# Patient Record
Sex: MALE | Race: WHITE | NOT HISPANIC OR LATINO | Employment: FULL TIME | URBAN - METROPOLITAN AREA
[De-identification: names, ages, dates, MRNs, and addresses within clinical notes are randomized per-mention and may not be internally consistent; named-entity substitution may affect disease eponyms.]

---

## 2017-06-05 ENCOUNTER — ALLSCRIPTS OFFICE VISIT (OUTPATIENT)
Dept: OTHER | Facility: OTHER | Age: 47
End: 2017-06-05

## 2017-06-07 ENCOUNTER — HOSPITAL ENCOUNTER (OUTPATIENT)
Dept: RADIOLOGY | Facility: HOSPITAL | Age: 47
Discharge: HOME | End: 2017-06-07
Payer: COMMERCIAL

## 2017-06-07 ENCOUNTER — ALLSCRIPTS OFFICE VISIT (OUTPATIENT)
Dept: OTHER | Facility: OTHER | Age: 47
End: 2017-06-07

## 2017-06-07 ENCOUNTER — GENERIC CONVERSION - ENCOUNTER (OUTPATIENT)
Dept: OTHER | Facility: OTHER | Age: 47
End: 2017-06-07

## 2017-06-07 ENCOUNTER — TRANSCRIBE ORDERS (OUTPATIENT)
Dept: ADMINISTRATIVE | Facility: HOSPITAL | Age: 47
End: 2017-06-07

## 2017-06-07 DIAGNOSIS — J20.9 ACUTE BRONCHITIS: ICD-10-CM

## 2017-06-07 PROCEDURE — 71020 HB CHEST X-RAY 2VW FRONTAL&LATL: CPT

## 2017-06-27 ENCOUNTER — ALLSCRIPTS OFFICE VISIT (OUTPATIENT)
Dept: OTHER | Facility: OTHER | Age: 47
End: 2017-06-27

## 2017-07-12 ENCOUNTER — TRANSCRIBE ORDERS (OUTPATIENT)
Dept: ADMINISTRATIVE | Facility: HOSPITAL | Age: 47
End: 2017-07-12

## 2017-07-12 ENCOUNTER — APPOINTMENT (OUTPATIENT)
Dept: LAB | Facility: HOSPITAL | Age: 47
End: 2017-07-12
Payer: COMMERCIAL

## 2017-07-12 DIAGNOSIS — Z00.8 HEALTH EXAMINATION IN POPULATION SURVEY: Primary | ICD-10-CM

## 2017-07-12 DIAGNOSIS — Z00.8 HEALTH EXAMINATION IN POPULATION SURVEY: ICD-10-CM

## 2017-07-12 LAB
CHOLEST SERPL-MCNC: 200 MG/DL (ref 50–200)
EST. AVERAGE GLUCOSE BLD GHB EST-MCNC: 111 MG/DL
HBA1C MFR BLD: 5.5 % (ref 4.2–6.3)
HDLC SERPL-MCNC: 31 MG/DL (ref 40–60)
LDLC SERPL CALC-MCNC: 146 MG/DL (ref 0–100)
TRIGL SERPL-MCNC: 117 MG/DL

## 2017-07-12 PROCEDURE — 80061 LIPID PANEL: CPT

## 2017-07-12 PROCEDURE — 83036 HEMOGLOBIN GLYCOSYLATED A1C: CPT

## 2017-07-12 PROCEDURE — 36415 COLL VENOUS BLD VENIPUNCTURE: CPT

## 2017-10-12 ENCOUNTER — ALLSCRIPTS OFFICE VISIT (OUTPATIENT)
Dept: OTHER | Facility: OTHER | Age: 47
End: 2017-10-12

## 2017-10-13 NOTE — PROGRESS NOTES
Assessment  1  Acute left otitis media (382 9) (S99 03)    Plan  Acute left otitis media    · Azithromycin 250 MG Oral Tablet; TAKE 2 TABLETS ON DAY 1 THEN TAKE 1  TABLET A DAY FOR 4 DAYS    Discussion/Summary    L OM and fever - d/w pt that symptoms may just be related to OM but does sound almost flu like but just right outside benefit of anti-viral medication, will start abx for L OM and advised symptomatic tx with rest/fluids/lozenges/hot tea and plenty of hand washing, call with new/worsening symptoms, note given out of work today and tomorrow and can return Monday if w/o fever for 24 hrs  The patient was counseled regarding instructions for management,-risk factor reductions,-prognosis,-patient and family education,-impressions,-risks and benefits of treatment options,-importance of compliance with treatment  Possible side effects of new medications were reviewed with the patient/guardian today  The treatment plan was reviewed with the patient/guardian  The patient/guardian understands and agrees with the treatment plan     Self Referrals: No      Chief Complaint  sick visit      History of Present Illness  HPI: Pt here with 3 -4 days of not feeling well  He has had fevers with Tm 103  1  He notes minimal congestion and mild ST  He notes mild cough but no SOB/wheezing  He notes no ear pain/sinus pain or pressured  He has body aches and HA's  He has not had a flu vaccine this year  His wife is also sick  He has tried Motrin and Theraflu  He took Motrin about 5 hrs ago  Review of Systems    Constitutional: fever,-feeling poorly,-chills-and-feeling tired  ENT: sore throat-and-nasal discharge, but-no earache  Respiratory: cough, but-no shortness of breath-and-no wheezing  Gastrointestinal: no nausea,-no vomiting-and-no diarrhea  Genitourinary: no dysuria  Musculoskeletal: arthralgias-and-myalgias  Integumentary: no rashes  Neurological: headache, but-no dizziness  Active Problems  1  History of Aftercare following surgery of the musculoskeletal system (V58 78) (Z47 89)   2  Allergic cough (786 2) (R05)   3  Pain in joint of right shoulder region (719 41) (M25 511)   4  Seasonal allergic rhinitis due to pollen (477 0) (J30 1)    Past Medical History  Active Problems And Past Medical History Reviewed: The active problems and past medical history were reviewed and updated today  Surgical History  Surgical History Reviewed: The surgical history was reviewed and updated today  Social History   · Being A Social Drinker   · Caffeine Use   · Chewing Tobacco Loose Leaf   · Former smoker (V15 82) (D15 884)   · Marital History - Currently   The social history was reviewed and updated today  Family History  Family History Reviewed: The family history was reviewed and updated today  Current Meds   1  12 Hour Nasal Spray 0 05 % Nasal Solution; Therapy: (Recorded:03Jun2014) to Recorded   2  Aleve CAPS; Therapy: (Recorded:03Jun2014) to Recorded   3  Allergy 10 MG Oral Tablet; Therapy: (XWSHOIQZ:[id number]) to Recorded   4  Montelukast Sodium 10 MG Oral Tablet; TAKE 1 TABLET DAILY AS DIRECTED; Therapy: [id number] to (Evaluate:[id number])  Requested for: 17XXL4601; Last   Rx:05Jun2017 Ordered    The medication list was reviewed and updated today  Allergies  1  Chantix TABS    Vitals   Recorded: [id number] 01:47PM   Temperature 97 5 F   Heart Rate 82   Systolic 811   Diastolic 82   Height 6 ft 3 in   Weight 245 lb    BMI Calculated 30 62   BSA Calculated 2 39     Physical Exam    Constitutional   General appearance: No acute distress, well appearing and well nourished  Ears, Nose, Mouth, and Throat   External inspection of ears and nose: Normal     Otoscopic examination: Abnormal  -L canal and TM with erythema and dec light reflex  Oropharynx: Abnormal  -mild post pharyngeal erythema, no exudate     Pulmonary   Respiratory effort: No increased work of breathing or signs of respiratory distress  Auscultation of lungs: Clear to auscultation, equal breath sounds bilaterally, no wheezes, no rales, no rhonci  Cardiovascular   Auscultation of heart: Normal rate and rhythm, normal S1 and S2, without murmurs  Lymphatic   Palpation of lymph nodes in neck: No lymphadenopathy  Musculoskeletal   Gait and station: Normal     Psychiatric   Mood and affect: Normal          Results/Data  (1) LIPID PANEL, FASTING [id number] 06:58AM Gayl December     Test Name Result Flag Reference   CHOLESTEROL 200 mg/dL     HDL,DIRECT 31 mg/dL L 40-60   Specimen collection should occur prior to Metamizole administration due to the potential for falsely depressed results  LDL CHOLESTEROL CALCULATED 146 mg/dL H 0-100   This is a fasting blood test  Water,black tea or black  coffee only after 9:00pm the night before test  Drink 2 glasses of water the morning of test         Triglyceride:         Normal              <150 mg/dl       Borderline High    150-199 mg/dl       High               200-499 mg/dl       Very High          >499 mg/dl  Cholesterol:         Desirable        <200 mg/dl      Borderline High  200-239 mg/dl      High             >239 mg/dl  HDL Cholesterol:        High    >59 mg/dL      Low     <41 mg/dL  LDL CALCULATED:    This screening LDL is a calculated result  It does not have the accuracy of the Direct Measured LDL in the monitoring of patients with hyperlipidemia and/or statin therapy  Direct Measure LDL (ESG735) must be ordered separately in these patients  TRIGLYCERIDES 117 mg/dL  <=150   Specimen collection should occur prior to N-Acetylcysteine or Metamizole administration due to the potential for falsely depressed results  (1) HEMOGLOBIN A1C [id number] 06:58AM Gayl December     Test Name Result Flag Reference   HEMOGLOBIN A1C 5 5 %  4 2-6 3   EST  AVG   GLUCOSE 111 mg/dl       (1) LIPID PANEL, FASTING [id number] 06:52AM Gayl December     Test Name Result Flag Reference   CHOLESTEROL 199 mg/dL     HDL,DIRECT 35 mg/dL L 40-60   Specimen collection should occur prior to Metamizole administration due to the potential for falsely depressed results  LDL CHOLESTEROL CALCULATED 126 mg/dL H 0-100   Triglyceride:         Normal              <150 mg/dl       Borderline High    150-199 mg/dl       High               200-499 mg/dl       Very High          >499 mg/dl  Cholesterol:         Desirable        <200 mg/dl      Borderline High  200-239 mg/dl      High             >239 mg/dl  HDL Cholesterol:        High    >59 mg/dL      Low     <41 mg/dL  LDL CALCULATED:    This screening LDL is a calculated result  It does not have the accuracy of the Direct Measured LDL in the monitoring of patients with hyperlipidemia and/or statin therapy  Direct Measure LDL (VIB777) must be ordered separately in these patients  TRIGLYCERIDES 189 mg/dL H <=150   Specimen collection should occur prior to N-Acetylcysteine or Metamizole administration due to the potential for falsely depressed results  (1) HEMOGLOBIN A1C [id number] 06:52AM Heriberto Borges     Test Name Result Flag Reference   HEMOGLOBIN A1C 5 7 %  4 2-6 3   EST  AVG   GLUCOSE 117 mg/dl         Signatures   Electronically signed by : Pedrito Garcias DO; Oct 12 2017  2:32PM EST                       (Author)

## 2017-10-14 ENCOUNTER — HOSPITAL ENCOUNTER (INPATIENT)
Facility: HOSPITAL | Age: 47
LOS: 2 days | Discharge: HOME | DRG: 871 | End: 2017-10-16
Attending: INTERNAL MEDICINE | Admitting: INTERNAL MEDICINE
Payer: COMMERCIAL

## 2017-10-14 ENCOUNTER — TRANSCRIBE ORDERS (OUTPATIENT)
Dept: URGENT CARE | Facility: CLINIC | Age: 47
End: 2017-10-14

## 2017-10-14 ENCOUNTER — APPOINTMENT (OUTPATIENT)
Dept: RADIOLOGY | Facility: CLINIC | Age: 47
End: 2017-10-14
Payer: COMMERCIAL

## 2017-10-14 ENCOUNTER — OFFICE VISIT (OUTPATIENT)
Dept: URGENT CARE | Facility: CLINIC | Age: 47
End: 2017-10-14
Payer: COMMERCIAL

## 2017-10-14 DIAGNOSIS — R52 PAIN: ICD-10-CM

## 2017-10-14 DIAGNOSIS — J18.9 COMMUNITY ACQUIRED PNEUMONIA: Primary | ICD-10-CM

## 2017-10-14 DIAGNOSIS — R05.9 COUGH: Primary | ICD-10-CM

## 2017-10-14 DIAGNOSIS — A41.9 SEPSIS (HCC): ICD-10-CM

## 2017-10-14 PROBLEM — J11.1 INFLUENZA: Status: ACTIVE | Noted: 2017-10-14

## 2017-10-14 PROBLEM — R65.10 SIRS (SYSTEMIC INFLAMMATORY RESPONSE SYNDROME) (HCC): Status: ACTIVE | Noted: 2017-10-14

## 2017-10-14 LAB
ALBUMIN SERPL BCP-MCNC: 3.4 G/DL (ref 3.5–5)
ALP SERPL-CCNC: 57 U/L (ref 46–116)
ALT SERPL W P-5'-P-CCNC: 37 U/L (ref 12–78)
ANION GAP SERPL CALCULATED.3IONS-SCNC: 12 MMOL/L (ref 4–13)
APTT PPP: 32 SECONDS (ref 23–35)
AST SERPL W P-5'-P-CCNC: 22 U/L (ref 5–45)
BASOPHILS # BLD MANUAL: 0 THOUSAND/UL (ref 0–0.1)
BASOPHILS NFR MAR MANUAL: 0 % (ref 0–1)
BILIRUB SERPL-MCNC: 0.8 MG/DL (ref 0.2–1)
BUN SERPL-MCNC: 15 MG/DL (ref 5–25)
CALCIUM SERPL-MCNC: 8.1 MG/DL (ref 8.3–10.1)
CHLORIDE SERPL-SCNC: 98 MMOL/L (ref 100–108)
CO2 SERPL-SCNC: 25 MMOL/L (ref 21–32)
CREAT SERPL-MCNC: 1.3 MG/DL (ref 0.6–1.3)
DOHLE BOD BLD QL SMEAR: PRESENT
EOSINOPHIL # BLD MANUAL: 0.1 THOUSAND/UL (ref 0–0.4)
EOSINOPHIL NFR BLD MANUAL: 1 % (ref 0–6)
ERYTHROCYTE [DISTWIDTH] IN BLOOD BY AUTOMATED COUNT: 12.6 % (ref 11.6–15.1)
GFR SERPL CREATININE-BSD FRML MDRD: 65 ML/MIN/1.73SQ M
GLUCOSE SERPL-MCNC: 94 MG/DL (ref 65–140)
HCT VFR BLD AUTO: 43.2 % (ref 36.5–49.3)
HGB BLD-MCNC: 15.2 G/DL (ref 12–17)
INR PPP: 1.07 (ref 0.86–1.16)
LACTATE SERPL-SCNC: 0.9 MMOL/L (ref 0.5–2)
LG PLATELETS BLD QL SMEAR: PRESENT
LYMPHOCYTES # BLD AUTO: 0.84 THOUSAND/UL (ref 0.6–4.47)
LYMPHOCYTES # BLD AUTO: 8 % (ref 14–44)
MCH RBC QN AUTO: 30.3 PG (ref 26.8–34.3)
MCHC RBC AUTO-ENTMCNC: 35.2 G/DL (ref 31.4–37.4)
MCV RBC AUTO: 86 FL (ref 82–98)
MONOCYTES # BLD AUTO: 0.21 THOUSAND/UL (ref 0–1.22)
MONOCYTES NFR BLD: 2 % (ref 4–12)
NEUTROPHILS # BLD MANUAL: 9.08 THOUSAND/UL (ref 1.85–7.62)
NEUTS SEG NFR BLD AUTO: 87 % (ref 43–75)
PLATELET # BLD AUTO: 139 THOUSANDS/UL (ref 149–390)
PLATELET BLD QL SMEAR: ABNORMAL
PMV BLD AUTO: 11.3 FL (ref 8.9–12.7)
POTASSIUM SERPL-SCNC: 3.6 MMOL/L (ref 3.5–5.3)
PROT SERPL-MCNC: 7.5 G/DL (ref 6.4–8.2)
PROTHROMBIN TIME: 13.7 SECONDS (ref 12.1–14.4)
RBC # BLD AUTO: 5.01 MILLION/UL (ref 3.88–5.62)
RBC MORPH BLD: NORMAL
SODIUM SERPL-SCNC: 135 MMOL/L (ref 136–145)
TOTAL CELLS COUNTED SPEC: 100
VARIANT LYMPHS # BLD AUTO: 2 %
WBC # BLD AUTO: 10.44 THOUSAND/UL (ref 4.31–10.16)

## 2017-10-14 PROCEDURE — 85730 THROMBOPLASTIN TIME PARTIAL: CPT | Performed by: PHYSICIAN ASSISTANT

## 2017-10-14 PROCEDURE — 96374 THER/PROPH/DIAG INJ IV PUSH: CPT

## 2017-10-14 PROCEDURE — 99284 EMERGENCY DEPT VISIT MOD MDM: CPT

## 2017-10-14 PROCEDURE — 99213 OFFICE O/P EST LOW 20 MIN: CPT

## 2017-10-14 PROCEDURE — 80053 COMPREHEN METABOLIC PANEL: CPT | Performed by: PHYSICIAN ASSISTANT

## 2017-10-14 PROCEDURE — 93005 ELECTROCARDIOGRAM TRACING: CPT | Performed by: PHYSICIAN ASSISTANT

## 2017-10-14 PROCEDURE — 85610 PROTHROMBIN TIME: CPT | Performed by: PHYSICIAN ASSISTANT

## 2017-10-14 PROCEDURE — 87040 BLOOD CULTURE FOR BACTERIA: CPT | Performed by: PHYSICIAN ASSISTANT

## 2017-10-14 PROCEDURE — 83605 ASSAY OF LACTIC ACID: CPT | Performed by: PHYSICIAN ASSISTANT

## 2017-10-14 PROCEDURE — 87798 DETECT AGENT NOS DNA AMP: CPT | Performed by: PHYSICIAN ASSISTANT

## 2017-10-14 PROCEDURE — 85007 BL SMEAR W/DIFF WBC COUNT: CPT | Performed by: PHYSICIAN ASSISTANT

## 2017-10-14 PROCEDURE — 85027 COMPLETE CBC AUTOMATED: CPT | Performed by: PHYSICIAN ASSISTANT

## 2017-10-14 PROCEDURE — S9088 SERVICES PROVIDED IN URGENT: HCPCS

## 2017-10-14 PROCEDURE — 96361 HYDRATE IV INFUSION ADD-ON: CPT

## 2017-10-14 PROCEDURE — 36415 COLL VENOUS BLD VENIPUNCTURE: CPT | Performed by: PHYSICIAN ASSISTANT

## 2017-10-14 PROCEDURE — 71020 HB CHEST X-RAY 2VW FRONTAL&LATL: CPT

## 2017-10-14 RX ORDER — ONDANSETRON 2 MG/ML
4 INJECTION INTRAMUSCULAR; INTRAVENOUS EVERY 4 HOURS PRN
Status: DISCONTINUED | OUTPATIENT
Start: 2017-10-14 | End: 2017-10-16 | Stop reason: HOSPADM

## 2017-10-14 RX ORDER — LEVOFLOXACIN 5 MG/ML
750 INJECTION, SOLUTION INTRAVENOUS EVERY 24 HOURS
Status: DISCONTINUED | OUTPATIENT
Start: 2017-10-15 | End: 2017-10-16 | Stop reason: HOSPADM

## 2017-10-14 RX ORDER — IBUPROFEN 600 MG/1
600 TABLET ORAL EVERY 6 HOURS PRN
Status: DISCONTINUED | OUTPATIENT
Start: 2017-10-14 | End: 2017-10-16 | Stop reason: HOSPADM

## 2017-10-14 RX ORDER — IPRATROPIUM BROMIDE AND ALBUTEROL SULFATE 2.5; .5 MG/3ML; MG/3ML
3 SOLUTION RESPIRATORY (INHALATION)
Status: DISCONTINUED | OUTPATIENT
Start: 2017-10-14 | End: 2017-10-16 | Stop reason: HOSPADM

## 2017-10-14 RX ORDER — SODIUM CHLORIDE 9 MG/ML
75 INJECTION, SOLUTION INTRAVENOUS CONTINUOUS
Status: DISCONTINUED | OUTPATIENT
Start: 2017-10-14 | End: 2017-10-15

## 2017-10-14 RX ORDER — IPRATROPIUM BROMIDE AND ALBUTEROL SULFATE 2.5; .5 MG/3ML; MG/3ML
SOLUTION RESPIRATORY (INHALATION)
Status: DISPENSED
Start: 2017-10-14 | End: 2017-10-15

## 2017-10-14 RX ORDER — 0.9 % SODIUM CHLORIDE 0.9 %
3 VIAL (ML) INJECTION AS NEEDED
Status: DISCONTINUED | OUTPATIENT
Start: 2017-10-14 | End: 2017-10-16 | Stop reason: HOSPADM

## 2017-10-14 RX ORDER — KETOROLAC TROMETHAMINE 30 MG/ML
15 INJECTION, SOLUTION INTRAMUSCULAR; INTRAVENOUS EVERY 6 HOURS PRN
Status: DISCONTINUED | OUTPATIENT
Start: 2017-10-14 | End: 2017-10-16 | Stop reason: HOSPADM

## 2017-10-14 RX ORDER — AZITHROMYCIN 250 MG/1
500 TABLET, FILM COATED ORAL EVERY 24 HOURS
COMMUNITY
End: 2017-10-16 | Stop reason: HOSPADM

## 2017-10-14 RX ORDER — KETOROLAC TROMETHAMINE 30 MG/ML
30 INJECTION, SOLUTION INTRAMUSCULAR; INTRAVENOUS ONCE
Status: COMPLETED | OUTPATIENT
Start: 2017-10-14 | End: 2017-10-14

## 2017-10-14 RX ORDER — NICOTINE 21 MG/24HR
1 PATCH, TRANSDERMAL 24 HOURS TRANSDERMAL DAILY
Status: DISCONTINUED | OUTPATIENT
Start: 2017-10-14 | End: 2017-10-16 | Stop reason: HOSPADM

## 2017-10-14 RX ORDER — LEVOFLOXACIN 5 MG/ML
750 INJECTION, SOLUTION INTRAVENOUS ONCE
Status: COMPLETED | OUTPATIENT
Start: 2017-10-14 | End: 2017-10-14

## 2017-10-14 RX ORDER — ACETAMINOPHEN 325 MG/1
650 TABLET ORAL EVERY 4 HOURS PRN
Status: DISCONTINUED | OUTPATIENT
Start: 2017-10-14 | End: 2017-10-16 | Stop reason: HOSPADM

## 2017-10-14 RX ORDER — GUAIFENESIN/DEXTROMETHORPHAN 100-10MG/5
10 SYRUP ORAL EVERY 4 HOURS PRN
Status: DISCONTINUED | OUTPATIENT
Start: 2017-10-14 | End: 2017-10-16 | Stop reason: HOSPADM

## 2017-10-14 RX ORDER — OSELTAMIVIR PHOSPHATE 75 MG/1
75 CAPSULE ORAL ONCE
Status: COMPLETED | OUTPATIENT
Start: 2017-10-14 | End: 2017-10-14

## 2017-10-14 RX ORDER — OSELTAMIVIR PHOSPHATE 75 MG/1
75 CAPSULE ORAL EVERY 12 HOURS SCHEDULED
Status: DISCONTINUED | OUTPATIENT
Start: 2017-10-15 | End: 2017-10-16

## 2017-10-14 RX ADMIN — LEVOFLOXACIN 750 MG: 5 INJECTION, SOLUTION INTRAVENOUS at 20:30

## 2017-10-14 RX ADMIN — OSELTAMIVIR PHOSPHATE 75 MG: 75 CAPSULE ORAL at 20:29

## 2017-10-14 RX ADMIN — IPRATROPIUM BROMIDE AND ALBUTEROL SULFATE 3 ML: 2.5; .5 SOLUTION RESPIRATORY (INHALATION) at 21:30

## 2017-10-14 RX ADMIN — SODIUM CHLORIDE 1000 ML: 0.9 INJECTION, SOLUTION INTRAVENOUS at 22:23

## 2017-10-14 RX ADMIN — SODIUM CHLORIDE 1000 ML: 0.9 INJECTION, SOLUTION INTRAVENOUS at 20:30

## 2017-10-14 RX ADMIN — SODIUM CHLORIDE 1000 ML: 0.9 INJECTION, SOLUTION INTRAVENOUS at 22:24

## 2017-10-14 RX ADMIN — KETOROLAC TROMETHAMINE 30 MG: 30 INJECTION, SOLUTION INTRAMUSCULAR at 19:08

## 2017-10-14 RX ADMIN — SODIUM CHLORIDE 125 ML/HR: 0.9 INJECTION, SOLUTION INTRAVENOUS at 22:22

## 2017-10-14 RX ADMIN — SODIUM CHLORIDE 1000 ML: 0.9 INJECTION, SOLUTION INTRAVENOUS at 19:08

## 2017-10-14 NOTE — ED PROVIDER NOTES
History  Chief Complaint   Patient presents with    Fever - 9 weeks to 74 years     Into ED via wheelchair from [facility] with fever cough and positive chest xray for pneumonia  Symptoms for 5 days, saw PMD started on Azithromycin and was beginning to feel better but today began with fever and cough again     Patient sent to the ED by [facility] urgent care for pneumonia  Patient started with cough 5 days ago  He saw his PCP 2 days ago and was started on zithromax  Patient felt better yesterday, but then started with fevers again this morning  He went to the urgent care and had an xray and was given tylenol and told to go to the ED for pneumonia  Wife also sick with similar symptoms, but is improving  History provided by:  Patient and spouse  Fever - 9 weeks to 74 years   Max temp prior to arrival:  103  Severity:  Moderate  Onset quality:  Gradual  Duration:  5 days  Timing:  Constant  Progression:  Worsening  Chronicity:  New  Relieved by:  Nothing  Worsened by:  Nothing  Associated symptoms: chills, cough, headaches, myalgias and nausea    Associated symptoms: no chest pain, no confusion, no congestion, no diarrhea, no dysuria, no ear pain, no rash, no rhinorrhea, no somnolence, no sore throat and no vomiting        Prior to Admission Medications   Prescriptions Last Dose Informant Patient Reported? Taking? azithromycin (ZITHROMAX) 250 mg tablet   Yes Yes   Sig: Take 500 mg by mouth every 24 hours      Facility-Administered Medications: None       History reviewed  No pertinent past medical history  History reviewed  No pertinent surgical history  History reviewed  No pertinent family history  I have reviewed and agree with the history as documented  Social History   Substance Use Topics    Smoking status: Former Smoker    Smokeless tobacco: Current User    Alcohol use Yes        Review of Systems   Constitutional: Positive for chills, diaphoresis, fatigue and fever     HENT: Negative for congestion, ear pain, rhinorrhea, sore throat and trouble swallowing  Respiratory: Positive for cough and shortness of breath  Cardiovascular: Negative for chest pain and leg swelling  Gastrointestinal: Positive for nausea  Negative for diarrhea and vomiting  Genitourinary: Negative for dysuria  Musculoskeletal: Positive for myalgias  Skin: Negative for color change and rash  Neurological: Positive for headaches  Negative for dizziness, weakness and numbness  Psychiatric/Behavioral: Negative for confusion  All other systems reviewed and are negative  Physical Exam  ED Triage Vitals   Temperature Pulse Respirations Blood Pressure SpO2   10/14/17 1844 10/14/17 1844 10/14/17 1844 10/14/17 1844 10/14/17 1844   (!) 103 °F (39 4 °C) 99 18 118/57 93 %      Temp Source Heart Rate Source Patient Position - Orthostatic VS BP Location FiO2 (%)   10/14/17 1844 10/14/17 1845 10/14/17 1845 -- --   Tympanic Monitor Lying        Pain Score       10/14/17 1844       No Pain           Physical Exam   Constitutional: He is oriented to person, place, and time  He appears well-developed and well-nourished  He is active and cooperative  He appears ill  He appears distressed  HENT:   Head: Normocephalic and atraumatic  Right Ear: Hearing, tympanic membrane and ear canal normal    Left Ear: Hearing, tympanic membrane and ear canal normal    Nose: Nose normal    Mouth/Throat: Uvula is midline, oropharynx is clear and moist and mucous membranes are normal    Eyes: Conjunctivae are normal  Pupils are equal, round, and reactive to light  Neck: Normal range of motion  Neck supple  Cardiovascular: Regular rhythm and normal heart sounds  Tachycardia present  No murmur heard  Pulmonary/Chest: Effort normal  He has no decreased breath sounds  He has no wheezes  He has rhonchi in the right lower field and the left lower field  He has no rales  Abdominal: Soft   Normal appearance and bowel sounds are normal  There is no tenderness  Musculoskeletal: Normal range of motion  He exhibits no edema or deformity  Neurological: He is alert and oriented to person, place, and time  He has normal strength  No sensory deficit  Gait normal    Skin: Skin is warm  No rash noted  He is diaphoretic  No pallor  Psychiatric: He has a normal mood and affect  His speech is normal  Cognition and memory are normal    Nursing note and vitals reviewed  ED Medications  Medications   sodium chloride (PF) 0 9 % injection 3 mL (not administered)   sodium chloride 0 9 % bolus 1,000 mL (1,000 mL Intravenous New Bag 10/14/17 1908)     Followed by   sodium chloride 0 9 % bolus 1,000 mL (not administered)     Followed by   sodium chloride 0 9 % bolus 1,000 mL (not administered)     Followed by   sodium chloride 0 9 % bolus 1,000 mL (not administered)   levofloxacin (LEVAQUIN) IVPB (premix) 750 mg (not administered)   oseltamivir (TAMIFLU) capsule 75 mg (not administered)   ketorolac (TORADOL) 30 mg/mL injection 30 mg (30 mg Intravenous Given 10/14/17 1908)       Diagnostic Studies  Labs Reviewed   CBC AND DIFFERENTIAL - Abnormal        Result Value Ref Range Status    WBC 10 44 (*) 4 31 - 10 16 Thousand/uL Final    Platelets 541 (*) 452 - 390 Thousands/uL Final    RBC 5 01  3 88 - 5 62 Million/uL Final    Hemoglobin 15 2  12 0 - 17 0 g/dL Final    Hematocrit 43 2  36 5 - 49 3 % Final    MCV 86  82 - 98 fL Final    MCH 30 3  26 8 - 34 3 pg Final    MCHC 35 2  31 4 - 37 4 g/dL Final    RDW 12 6  11 6 - 15 1 % Final    MPV 11 3  8 9 - 12 7 fL Final    Narrative: This is an appended report  These results have been appended to a previously verified report     COMPREHENSIVE METABOLIC PANEL - Abnormal     Sodium 135 (*) 136 - 145 mmol/L Final    Chloride 98 (*) 100 - 108 mmol/L Final    Calcium 8 1 (*) 8 3 - 10 1 mg/dL Final    Albumin 3 4 (*) 3 5 - 5 0 g/dL Final    Potassium 3 6  3 5 - 5 3 mmol/L Final    CO2 25  21 - 32 mmol/L Final    Anion Gap 12  4 - 13 mmol/L Final    BUN 15  5 - 25 mg/dL Final    Creatinine 1 30  0 60 - 1 30 mg/dL Final    Comment: Standardized to IDMS reference method    Glucose 94  65 - 140 mg/dL Final    Comment:   If the patient is fasting, the ADA then defines impaired fasting glucose as > 100 mg/dL and diabetes as > or equal to 123 mg/dL  Specimen collection should occur prior to Sulfasalazine administration due to the potential for falsely depressed results  Specimen collection should occur prior to Sulfapyridine administration due to the potential for falsely elevated results  AST 22  5 - 45 U/L Final    Comment:   Specimen collection should occur prior to Sulfasalazine administration due to the potential for falsely depressed results  ALT 37  12 - 78 U/L Final    Comment:   Specimen collection should occur prior to Sulfasalazine administration due to the potential for falsely depressed results  Alkaline Phosphatase 57  46 - 116 U/L Final    Total Protein 7 5  6 4 - 8 2 g/dL Final    Total Bilirubin 0 80  0 20 - 1 00 mg/dL Final    eGFR 65  ml/min/1 73sq m Final    Narrative:     National Kidney Disease Education Program recommendations are as follows:  GFR calculation is accurate only with a steady state creatinine  Chronic Kidney disease less than 60 ml/min/1 73 sq  meters  Kidney failure less than 15 ml/min/1 73 sq  meters     MANUAL DIFFERENTIAL(PHLEBS DO NOT ORDER) - Abnormal     Segmented % 87 (*) 43 - 75 % Final    Lymphocytes % 8 (*) 14 - 44 % Final    Monocytes % 2 (*) 4 - 12 % Final    Atypical Lymphocytes % 2 (*) <=0 % Final    Absolute Neutrophils 9 08 (*) 1 85 - 7 62 Thousand/uL Final    Platelet Estimate Borderline (*) Adequate Final    Eosinophils % 1  0 - 6 % Final    Basophils % 0  0 - 1 % Final    Lymphocytes Absolute 0 84  0 60 - 4 47 Thousand/uL Final    Monocytes Absolute 0 21  0 00 - 1 22 Thousand/uL Final    Eosinophils Absolute 0 10  0 00 - 0 40 Thousand/uL Final    Basophils Absolute 0 00 0 00 - 0 10 Thousand/uL Final    Total Counted 100   Final    Dohle Bodies Present   Final    RBC Morphology Normal   Final    Large Platelet Present   Final   LACTIC ACID, PLASMA - Normal    LACTIC ACID 0 9  0 5 - 2 0 mmol/L Final    Narrative:     Result may be elevated if tourniquet was used during collection  PROTIME-INR - Normal    Protime 13 7  12 1 - 14 4 seconds Final    INR 1 07  0 86 - 1 16 Final   APTT - Normal    PTT 32  23 - 35 seconds Final    Narrative: Therapeutic Heparin Range = 60-90 seconds   INFLUENZA A/B AND RSV, PCR   BLOOD CULTURE   BLOOD CULTURE   LACTIC ACID, PLASMA   UA W REFLEX TO MICROSCOPIC WITH REFLEX TO CULTURE       No orders to display       Procedures  ECG 12 Lead Documentation  Date/Time: 10/14/2017 7:09 PM  Performed by: Mari Amador by: Deysi Farris     Indications / Diagnosis:  Tachycardia  Patient location:  ED  Previous ECG:     Previous ECG:  Unavailable  Rate:     ECG rate:  92    ECG rate assessment: normal    Rhythm:     Rhythm: sinus rhythm    QRS:     QRS axis:  Normal  Conduction:     Conduction: normal    ST segments:     ST segments:  Normal  T waves:     T waves: normal            Phone Contacts  ED Phone Contact    ED Course  ED Course as of Oct 14 1954   Sat Oct 14, 2017   1913 Reviewed CXR from urgent care, there is an infiltrate RLL  Initial Sepsis Screening     Row Name 10/14/17 1944                Is the patient's history suggestive of a new or worsening infection? (!)  Yes (Proceed)  -CD        Suspected source of infection pneumonia  -CD        Are two or more of the following signs & symptoms of infection both present and new to the patient?  (!)  Yes (Proceed)  -CD        Indicate SIRS criteria Tachycardia > 90 bpm;Hyperthemia > 38 3C (100 9F)  -CD        If the answer is yes to both questions, suspicion of sepsis is present          If severe sepsis is present AND tissue hypoperfusion perists in the hour after fluid resuscitation or lactate > 4, the patient meets criteria for SEPTIC SHOCK          Are any of the following organ dysfunction criteria present within 6 hours of suspected infection and SIRS criteria that are NOT considered to be chronic conditions? No  -CD        Organ dysfunction          Date of presentation of severe sepsis          Time of presentation of severe sepsis          Tissue hypoperfusion persists in the hour after crystalloid fluid administration, evidenced, by either:          Was hypotension present within one hour of the conclusion of crystalloid fluid administration?         Date of presentation of septic shock          Time of presentation of septic shock            User Key  (r) = Recorded By, (t) = Taken By, (c) = Cosigned By    Initials Name Provider Type    CD Oneyda Pond PA-C Physician Assistant                  MDM  Number of Diagnoses or Management Options  Community acquired pneumonia: established and worsening  Sepsis [facility]): new and requires workup     Amount and/or Complexity of Data Reviewed  Clinical lab tests: ordered and reviewed  Review and summarize past medical records: yes    Patient Progress  Patient progress: stable    CritCare Time    Disposition  Final diagnoses:   Community acquired pneumonia   Sepsis [facility])     ED Disposition     ED Disposition Condition Comment    Admit  Case was discussed with SYLVIA Marshall and the patient's admission status was agreed to be Admission Status: inpatient status to the service of Dr Tawny Quigley   Follow-up Information    None       Patient's Medications   Discharge Prescriptions    No medications on file     No discharge procedures on file      ED Provider  Electronically Signed by       Oneyda Pond PA-C  10/14/17 1954

## 2017-10-15 PROBLEM — E87.1 HYPONATREMIA: Status: ACTIVE | Noted: 2017-10-15

## 2017-10-15 PROBLEM — E66.9 OBESITY: Status: ACTIVE | Noted: 2017-10-15

## 2017-10-15 PROBLEM — A41.9 SEPSIS (HCC): Status: ACTIVE | Noted: 2017-10-15

## 2017-10-15 LAB
ALBUMIN SERPL BCP-MCNC: 2.6 G/DL (ref 3.5–5)
ALP SERPL-CCNC: 51 U/L (ref 46–116)
ALT SERPL W P-5'-P-CCNC: 24 U/L (ref 12–78)
ANION GAP SERPL CALCULATED.3IONS-SCNC: 7 MMOL/L (ref 4–13)
AST SERPL W P-5'-P-CCNC: 18 U/L (ref 5–45)
BILIRUB SERPL-MCNC: 0.6 MG/DL (ref 0.2–1)
BUN SERPL-MCNC: 17 MG/DL (ref 5–25)
CALCIUM SERPL-MCNC: 7.7 MG/DL (ref 8.3–10.1)
CHLORIDE SERPL-SCNC: 107 MMOL/L (ref 100–108)
CO2 SERPL-SCNC: 27 MMOL/L (ref 21–32)
CREAT SERPL-MCNC: 1.24 MG/DL (ref 0.6–1.3)
ERYTHROCYTE [DISTWIDTH] IN BLOOD BY AUTOMATED COUNT: 12.5 % (ref 11.6–15.1)
FLUAV AG SPEC QL: NORMAL
FLUBV AG SPEC QL: NORMAL
GFR SERPL CREATININE-BSD FRML MDRD: 68 ML/MIN/1.73SQ M
GLUCOSE SERPL-MCNC: 94 MG/DL (ref 65–140)
HCT VFR BLD AUTO: 38.8 % (ref 36.5–49.3)
HGB BLD-MCNC: 13.2 G/DL (ref 12–17)
MAGNESIUM SERPL-MCNC: 2 MG/DL (ref 1.6–2.6)
MCH RBC QN AUTO: 29.5 PG (ref 26.8–34.3)
MCHC RBC AUTO-ENTMCNC: 34 G/DL (ref 31.4–37.4)
MCV RBC AUTO: 87 FL (ref 82–98)
PLATELET # BLD AUTO: 141 THOUSANDS/UL (ref 149–390)
PMV BLD AUTO: 11.4 FL (ref 8.9–12.7)
POTASSIUM SERPL-SCNC: 4 MMOL/L (ref 3.5–5.3)
PROT SERPL-MCNC: 5.9 G/DL (ref 6.4–8.2)
RBC # BLD AUTO: 4.47 MILLION/UL (ref 3.88–5.62)
RSV B RNA SPEC QL NAA+PROBE: NORMAL
SODIUM SERPL-SCNC: 141 MMOL/L (ref 136–145)
WBC # BLD AUTO: 8.81 THOUSAND/UL (ref 4.31–10.16)

## 2017-10-15 PROCEDURE — 85027 COMPLETE CBC AUTOMATED: CPT | Performed by: NURSE PRACTITIONER

## 2017-10-15 PROCEDURE — 83735 ASSAY OF MAGNESIUM: CPT | Performed by: NURSE PRACTITIONER

## 2017-10-15 PROCEDURE — 87070 CULTURE OTHR SPECIMN AEROBIC: CPT | Performed by: NURSE PRACTITIONER

## 2017-10-15 PROCEDURE — 80053 COMPREHEN METABOLIC PANEL: CPT | Performed by: NURSE PRACTITIONER

## 2017-10-15 PROCEDURE — 87205 SMEAR GRAM STAIN: CPT | Performed by: NURSE PRACTITIONER

## 2017-10-15 PROCEDURE — 94760 N-INVAS EAR/PLS OXIMETRY 1: CPT

## 2017-10-15 PROCEDURE — 94640 AIRWAY INHALATION TREATMENT: CPT

## 2017-10-15 RX ADMIN — IPRATROPIUM BROMIDE AND ALBUTEROL SULFATE 3 ML: 2.5; .5 SOLUTION RESPIRATORY (INHALATION) at 08:52

## 2017-10-15 RX ADMIN — IBUPROFEN 600 MG: 600 TABLET, FILM COATED ORAL at 03:39

## 2017-10-15 RX ADMIN — ACETAMINOPHEN 650 MG: 325 TABLET ORAL at 23:50

## 2017-10-15 RX ADMIN — OSELTAMIVIR PHOSPHATE 75 MG: 75 CAPSULE ORAL at 23:37

## 2017-10-15 RX ADMIN — IPRATROPIUM BROMIDE AND ALBUTEROL SULFATE 3 ML: 2.5; .5 SOLUTION RESPIRATORY (INHALATION) at 21:11

## 2017-10-15 RX ADMIN — LEVOFLOXACIN 750 MG: 5 INJECTION, SOLUTION INTRAVENOUS at 23:36

## 2017-10-15 RX ADMIN — ACETAMINOPHEN 650 MG: 325 TABLET ORAL at 02:31

## 2017-10-15 RX ADMIN — ACETAMINOPHEN 650 MG: 325 TABLET ORAL at 14:56

## 2017-10-15 RX ADMIN — IPRATROPIUM BROMIDE AND ALBUTEROL SULFATE 3 ML: 2.5; .5 SOLUTION RESPIRATORY (INHALATION) at 13:53

## 2017-10-15 RX ADMIN — OSELTAMIVIR PHOSPHATE 75 MG: 75 CAPSULE ORAL at 09:32

## 2017-10-15 RX ADMIN — SODIUM CHLORIDE 125 ML/HR: 0.9 INJECTION, SOLUTION INTRAVENOUS at 06:23

## 2017-10-15 NOTE — PROGRESS NOTES
Progress Note - Gilles Barry 49 y o  male MRN: [id number]    Unit/Bed#: [address] [place] [address] Encounter: [id number]      Assessment:  Patient Active Problem List    Diagnosis Date Noted    Sepsis ([address] ) 10/15/2017    Hyponatremia 10/15/2017    Obesity 10/15/2017    PNA (pneumonia) 10/14/2017    Influenza 10/14/2017       Plan:    Continue IV Levaquin for left-sided pneumonia  Suspicion for influenza infection is high will continue Tamiflu until influenza PCR is back  Tachycardia is improved will decrease IV fluid rate to 75 cc an hour  Continue nicotine patch for smoking cessation  BMI is 31 he meets criteria for obesity  Patient will require more than 2 midnights hospital stay  Subjective:   Patient developed fevers of more than 101° F along with generalized muscle aches and pains about 3 days ago  He had no sore throat no runny nose but developed a cough which is productive of whitish sputum and fevers staying get better despite starting to receive azithromycin  At the start of his illness he also has severe headache located in the frontal area that by today resolved  Due to the recurrent high fevers and worsening cough he came to the ER for evaluation where he was found to be tachycardic with a heart rate more than 100, tachypneic with respiratory more than 20 and chest x-ray suggests left lower lobe infiltrates  Patient's temperature was 101 8° at 3 o'clock this morning  This morning patient only complains of mild productive cough denies chest pain, pleurisy, shortness of breath, wheezing, nausea, vomiting, diarrhea, abdominal pain, dysuria, rash, joint stiffness or swelling  Patient has no lactic acidosis and leukocyte count is normal this morning  He was hyponatremic on admission with sodium 135 today is 141 on IV fluids  All other ROS are negative  Objective:     Vitals: Blood pressure 100/55, pulse 62, temperature 98 °F (36 7 °C), temperature source Oral, resp   rate 18, height 6' 1" (1 854 m), weight 109 kg (240 lb 4 8 oz), SpO2 97 %  ,Body mass index is 31 7 kg/m²  Intake/Output Summary (Last 24 hours) at 10/15/17 0840  Last data filed at 10/15/17 0757   Gross per 24 hour   Intake             7220 ml   Output              900 ml   Net             6320 ml       Physical Exam:    Alert and awake in no acute distress on room air  Head normocephalic, PERRLA   Oral membranes are moist, no pharyngeal exudates seen  Neck supple, no lymphadenopathy there was no JVD  Lungs inspiratory crackles were heard at the left posterior base with dullness to percussion  Heart regular rate and rythm, normal heart sounds  Abdomen soft, active bowel sounds, non-tender, non-distended  Extremities: there is no joint effusion or warmth  Skin: warm, no hives seen, no cellulitis seen, there is no cyanosis or edema  Neuro: no facial droop, tongue is midline, strength 5/5 bilateral and equal, speech is normal  Psych: appropriate mood and affect      Lab, Imaging and other studies: I have personally reviewed pertinent reports   See below         Current Facility-Administered Medications   Medication Dose Route Frequency    acetaminophen (TYLENOL) tablet 650 mg  650 mg Oral Q4H PRN    dextromethorphan-guaiFENesin (ROBITUSSIN DM)  mg/5 mL oral syrup 10 mL  10 mL Oral Q4H PRN    ibuprofen (MOTRIN) tablet 600 mg  600 mg Oral Q6H PRN    influenza inactivated quadrivalent vaccine (FLULAVAL) IM injection 0 5 mL  0 5 mL Intramuscular Prior to discharge    ipratropium-albuterol (DUO-NEB) 0 5-2 5 mg/mL inhalation solution **AcuDose Override Pull**        ipratropium-albuterol (DUO-NEB) 0 5-2 5 mg/mL inhalation solution 3 mL  3 mL Nebulization Q6H While awake    ketorolac (TORADOL) 30 mg/mL injection 15 mg  15 mg Intravenous Q6H PRN    levofloxacin (LEVAQUIN) IVPB (premix) 750 mg  750 mg Intravenous Q24H    nicotine (NICODERM CQ) 21 mg/24 hr TD 24 hr patch 1 patch  1 patch Transdermal Daily    ondansetron (ZOFRAN) injection 4 mg  4 mg Intravenous Q4H PRN    oseltamivir (TAMIFLU) capsule 75 mg  75 mg Oral Q12H [address]    sodium chloride (PF) 0 9 % injection 3 mL  3 mL Intravenous PRN    sodium chloride 0 9 % infusion  75 mL/hr Intravenous Continuous       Admission on 10/14/2017   Component Date Value    WBC 10/14/2017 10 44*    RBC 10/14/2017 5 01     Hemoglobin 10/14/2017 15 2     Hematocrit 10/14/2017 43 2     MCV 10/14/2017 86     MCH 10/14/2017 30 3     MCHC 10/14/2017 35 2     RDW 10/14/2017 12 6     MPV 10/14/2017 11 3     Platelets 33/07/8902 139*    Sodium 10/14/2017 135*    Potassium 10/14/2017 3 6     Chloride 10/14/2017 98*    CO2 10/14/2017 25     Anion Gap 10/14/2017 12     BUN 10/14/2017 15     Creatinine 10/14/2017 1 30     Glucose 10/14/2017 94     Calcium 10/14/2017 8 1*    AST 10/14/2017 22     ALT 10/14/2017 37     Alkaline Phosphatase 10/14/2017 57     Total Protein 10/14/2017 7 5     Albumin 10/14/2017 3 4*    Total Bilirubin 10/14/2017 0 80     eGFR 10/14/2017 65     LACTIC ACID 10/14/2017 0 9     Protime 10/14/2017 13 7     INR 10/14/2017 1 07     PTT 10/14/2017 32     Segmented % 10/14/2017 87*    Lymphocytes % 10/14/2017 8*    Monocytes % 10/14/2017 2*    Eosinophils % 10/14/2017 1     Basophils % 10/14/2017 0     Atypical Lymphocytes % 10/14/2017 2*    Absolute Neutrophils 10/14/2017 9 08*    Lymphocytes Absolute 10/14/2017 0 84     Monocytes Absolute 10/14/2017 0 21     Eosinophils Absolute 10/14/2017 0 10     Basophils Absolute 10/14/2017 0 00     Total Counted 10/14/2017 100     Dohle Bodies 10/14/2017 Present     RBC Morphology 10/14/2017 Normal     Platelet Estimate 69/87/8845 Borderline*    Large Platelet 55/63/2003 Present     Sodium 10/15/2017 141     Potassium 10/15/2017 4 0     Chloride 10/15/2017 107     CO2 10/15/2017 27     Anion Gap 10/15/2017 7     BUN 10/15/2017 17     Creatinine 10/15/2017 1 24     Glucose 10/15/2017 94  Calcium 10/15/2017 7 7*    AST 10/15/2017 18     ALT 10/15/2017 24     Alkaline Phosphatase 10/15/2017 51     Total Protein 10/15/2017 5 9*    Albumin 10/15/2017 2 6*    Total Bilirubin 10/15/2017 0 60     eGFR 10/15/2017 68     Magnesium 10/15/2017 2 0     WBC 10/15/2017 8 81     RBC 10/15/2017 4 47     Hemoglobin 10/15/2017 13 2     Hematocrit 10/15/2017 38 8     MCV 10/15/2017 87     MCH 10/15/2017 29 5     MCHC 10/15/2017 34 0     RDW 10/15/2017 12 5     Platelets 52/17/1243 141*    MPV 10/15/2017 11 4        Prior to Admission Medications   Prescriptions Last Dose Informant Patient Reported? Taking?    azithromycin (ZITHROMAX) 250 mg tablet   Yes Yes   Sig: Take 500 mg by mouth every 24 hours      Facility-Administered Medications: None         Eva Gaming MD

## 2017-10-15 NOTE — H&P
H&P Exam - Savage Bruno 49 y o  male MRN: [id number]    Unit/Bed#: [address] [place] [address] Encounter: [id number]      Assessment:  Principal Problem:    PNA (pneumonia)  Active Problems:    SIRS (systemic inflammatory response syndrome) (HCC)    Influenza  Resolved Problems:    * No resolved hospital problems  *      Plan:   Pneumonia rule out influenza  SIRS and sepsis without severe sepsis  Admit Med surg  Droplet isolation rule out influenza  Tamiflu  levaquin  duonebs  IVF        History of Present Illness       Review of Systems   Constitutional: Positive for chills, diaphoresis, fatigue and fever  Respiratory: Positive for cough and chest tightness  Cardiovascular: Negative for chest pain, palpitations and leg swelling  Neurological: Positive for weakness  All other systems reviewed and are negative  Historical Information   History reviewed  No pertinent past medical history  History reviewed  No pertinent surgical history  Social History   History   Alcohol Use    Yes     History   Drug Use No     History   Smoking Status    Former Smoker   Smokeless Tobacco    Current User     History reviewed  No pertinent family history      Meds/Allergies   Meds and Allergies Reviewed     Allergies   Allergen Reactions    Chantix [Varenicline] Palpitations       Scheduled Meds    [START ON 10/15/2017] enoxaparin 40 mg Subcutaneous Daily   levofloxacin 750 mg Intravenous Once   [START ON 10/15/2017] levofloxacin 750 mg Intravenous Q24H   nicotine 1 patch Transdermal Daily   sodium chloride 1,000 mL Intravenous Once   Followed by      sodium chloride 1,000 mL Intravenous Once   Followed by      sodium chloride 1,000 mL Intravenous Once     Continuous Infusions    sodium chloride 125 mL/hr     PRN Meds    acetaminophen    ondansetron    sodium chloride (PF)  Prescriptions Prior to Admission   Medication    azithromycin (ZITHROMAX) 250 mg tablet       Objective   First Vitals:   Blood Pressure: 118/57 (10/14/17 1844)  Pulse: 99 (10/14/17 1844)  Temperature: (!) 103 °F (39 4 °C) (650 tylenol mg given approximately 30 minutes ago) (10/14/17 1844)  Temp Source: Tympanic (10/14/17 1844)  Respirations: 18 (10/14/17 1844)  Height: 6' 1" (185 4 cm) (10/14/17 1844)  Weight - Scale: 111 kg (245 lb) (10/14/17 1844)  SpO2: 93 % (10/14/17 1844)    Current Vitals:   Blood Pressure: 103/56 (10/14/17 1945)  Pulse: 87 (10/14/17 1945)  Temperature: (!) 101 3 °F (38 5 °C) (10/14/17 1930)  Temp Source: Tympanic (10/14/17 1844)  Respirations: 22 (10/14/17 1945)  Height: 6' 1" (185 4 cm) (10/14/17 1844)  Weight - Scale: 111 kg (245 lb) (10/14/17 1844)  SpO2: 92 % (10/14/17 1945)    Intake/Output Summary (Last 24 hours) at 10/14/17 2049  Last data filed at 10/14/17 1908   Gross per 24 hour   Intake             1000 ml   Output                0 ml   Net             1000 ml     Invasive Devices     Peripheral Intravenous Line            Peripheral IV 10/14/17 Left Antecubital less than 1 day              Physical Exam   Constitutional: He is oriented to person, place, and time  He appears well-developed  HENT:   Head: Normocephalic and atraumatic  Eyes: Conjunctivae and EOM are normal  Pupils are equal, round, and reactive to light  Neck: Normal range of motion  Neck supple  No JVD present  No tracheal deviation present  No thyromegaly present  Cardiovascular: Normal rate, regular rhythm, normal heart sounds and intact distal pulses  Pulmonary/Chest: Effort normal  No stridor  No respiratory distress  He has rhonchi in the right lower field and the left lower field  He exhibits no tenderness  Abdominal: Soft  Bowel sounds are normal  He exhibits no distension  There is no tenderness  Genitourinary: Penis normal    Musculoskeletal: Normal range of motion  He exhibits no edema, tenderness or deformity  Lymphadenopathy:     He has no cervical adenopathy  Neurological: He is alert and oriented to person, place, and time  He has normal reflexes  Skin: Skin is warm and dry  Psychiatric: He has a normal mood and affect  His behavior is normal    Nursing note and vitals reviewed  Lab Results: I have reviewed all films and/or reports available    Results from last 7 days  Lab Units 10/14/17  1900   WBC Thousand/uL 10 44*   HEMOGLOBIN g/dL 15 2   HEMATOCRIT % 43 2   PLATELETS Thousands/uL 139*   LYMPHO PCT % 8*   MONO PCT MAN % 2*   EOSINO PCT MANUAL % 1       Results from last 7 days  Lab Units 10/14/17  1900   SODIUM mmol/L 135*   POTASSIUM mmol/L 3 6   CHLORIDE mmol/L 98*   CO2 mmol/L 25   BUN mg/dL 15   CREATININE mg/dL 1 30   CALCIUM mg/dL 8 1*   TOTAL PROTEIN g/dL 7 5   BILIRUBIN TOTAL mg/dL 0 80   ALK PHOS U/L 57   ALT U/L 37   AST U/L 22   GLUCOSE RANDOM mg/dL 94     No results found for: Rollo Josué      Results from last 7 days  Lab Units 10/14/17  1900   INR  1 07     Urinalysis: No results found for: Canary Kennel, SPECGRAV, PHUR, LEUKOCYTESUR, NITRITE, PROTEINUA, GLUCOSEU, KETONESU, BILIRUBINUR, BLOODU  No results found for: Rosaura Dynes, WOUNDCULT, SPUTUMCULTUR      Imaging: I have reviewed all films and/or reports available  RLL pna      Code Status: Level 1 - Full Code  Advance Directive and Living Will:        Based on this patients condition, I anticipate a greater than 2 midnight stay  This patient is admitted to inpatient status       SYLVIA Givens  10/14/2017,8:49 PM

## 2017-10-15 NOTE — CASE MANAGEMENT
Initial Clinical Review    Admission: Date/Time/Statement: 10/14/17 @ 1952     Orders Placed This Encounter   Procedures    Inpatient Admission (expected length of stay for this patient is greater than two midnights)     Standing Status:   Standing     Number of Occurrences:   1     Order Specific Question:   Admitting Physician     Answer:   Sergio Conti [[id number]]     Order Specific Question:   Level of Care     Answer:   Med Surg [[id number]]     Order Specific Question:   Estimated length of stay     Answer:   More than 2 Midnights     Order Specific Question:   Certification     Answer:   I certify that inpatient services are medically necessary for this patient for a duration of greater than two midnights  See H&P and MD Progress Notes for additional information about the patient's course of treatment  ED: Date/Time/Mode of Arrival:   ED Arrival Information     Expected Arrival Acuity Means of Arrival Escorted By Service Admission Type    - 10/14/2017 18:26 Urgent Walk-In Sp[facility] General Medicine Urgent    Arrival Complaint    pneumonia          Chief Complaint:   Chief Complaint   Patient presents with    Fever - 9 weeks to 74 years     Into [facility] with fever cough and positive chest xray for pneumonia  Symptoms for 5 days, saw PMD started on Azithromycin and was beginning to feel better but today began with fever and cough again       History of Illness: Patient sent to the ED by [facility] urgent care for pneumonia  Patient started with cough 5 days ago  He saw his PCP 2 days ago and was started on zithromax  Patient felt better yesterday, but then started with fevers again this morning  He went to the urgent care and had an xray and was given tylenol and told to go to the ED for pneumonia      ED Vital Signs:   ED Triage Vitals   Temperature Pulse Respirations Blood Pressure SpO2   10/14/17 1844 10/14/17 1844 10/14/17 1844 10/14/17 1844 10/14/17 1844   (!) 103 °F (39 4 °C) 99 18 118/57 93 %      Temp Source Heart Rate Source Patient Position - Orthostatic VS BP Location FiO2 (%)   10/14/17 1844 10/14/17 1845 10/14/17 1845 10/14/17 2108 --   Tympanic Monitor Lying Right arm       Pain Score       10/14/17 1844       No Pain        Wt Readings from Last 1 Encounters:   10/15/17 109 kg (240 lb 4 8 oz)       Vital Signs (abnormal):  Maximum temperature 103 - low of 101 3 in ED; respiratory rate 24  Exam - distressed  Tachycardia  Rhonchi  Diaphoretic  Abnormal Labs/Diagnostic Test Results:   Wbc 10 44  Platelets 155  Na 394  Cl 98  Calcium 8 1  Albumin 3 4  CxR- rll pneumonia    Labs 10/15/2017  Am -  Calcium 7 7  Total protein 5 9  Albumin 2 6  ED Treatment: blood cultures  Medication Administration from 10/14/2017 1826 to 10/14/2017 2045       Date/Time Order Dose Route Action Action by Comments     10/14/2017 1908 ketorolac (TORADOL) 30 mg/mL injection 30 mg 30 mg Intravenous Given Mark Vale RN      10/14/2017 2030 sodium chloride 0 9 % bolus 1,000 mL 0 mL Intravenous Stopped Lissette Griffiths RN      10/14/2017 1908 sodium chloride 0 9 % bolus 1,000 mL 1,000 mL Intravenous New Bag Mark Vale RN      10/14/2017 2030 sodium chloride 0 9 % bolus 1,000 mL 1,000 mL Intravenous New Bag Lissette Griffiths RN      10/14/2017 2030 levofloxacin (LEVAQUIN) IVPB (premix) 750 mg 750 mg Intravenous New Bag Lissette Griffiths RN      10/14/2017 2029 oseltamivir (TAMIFLU) capsule 75 mg 75 mg Oral Given Lissette Griffiths RN           Past Medical/Surgical History: Active Ambulatory Problems     Diagnosis Date Noted    No Active Ambulatory Problems     Resolved Ambulatory Problems     Diagnosis Date Noted    No Resolved Ambulatory Problems     No Additional Past Medical History       Admitting Diagnosis: Pneumonia and influenza [J11 00]  Community acquired pneumonia [J18 9]  Sepsis ([address] ) [A41 9]    Age/Sex: 49 y o  male    Assessment/Plan: Pneumonia rule out influenza    SIRS and sepsis without severe sepsis  Admit Med surg  Droplet isolation rule out influenza  Tamiflu  levaquin  duonebs  IVF    Admission Orders: 10/14/2017  1953 INPATIENT   Scheduled Meds:   ipratropium-albuterol 3 mL Nebulization Q6H While awake   levofloxacin 750 mg Intravenous Q24H   nicotine 1 patch Transdermal Daily   oseltamivir 75 mg Oral Q12H [address]     Continuous Infusions:   sodium chloride 75 mL/hr Last Rate: 75 mL/hr (10/15/17 0934)     PRN Meds:   Acetaminophen - used x 1      Ibuprofen - used x 1  OTHER ORDERS:  scds  Sputum culture  [facility] in the [facility] by [facility] for 2017  Network Utilization Review Department  Phone: [phone]; Fax [phone]  ATTENTION: The Network Utilization Review Department is now centralized for our 7 Facilities  Make a note that we have a new phone and fax numbers for our Department  Please call with any questions or concerns to [phone] and carefully follow the prompts so that you are directed to the right person  All voicemails are confidential  Fax any determinations, approvals, denials, and requests for initial or continue stay review clinical to [phone]  Due to HIGH CALL volume, it would be easier if you could please send faxed requests to expedite your requests and in part, help us provide discharge notifications faster

## 2017-10-16 VITALS
WEIGHT: 240.3 LBS | HEART RATE: 77 BPM | RESPIRATION RATE: 20 BRPM | SYSTOLIC BLOOD PRESSURE: 115 MMHG | HEIGHT: 73 IN | DIASTOLIC BLOOD PRESSURE: 66 MMHG | BODY MASS INDEX: 31.85 KG/M2 | TEMPERATURE: 98.1 F | OXYGEN SATURATION: 97 %

## 2017-10-16 PROBLEM — E87.1 HYPONATREMIA: Status: RESOLVED | Noted: 2017-10-15 | Resolved: 2017-10-16

## 2017-10-16 PROBLEM — J11.1 INFLUENZA: Status: RESOLVED | Noted: 2017-10-14 | Resolved: 2017-10-16

## 2017-10-16 PROBLEM — A41.9 SEPSIS (HCC): Status: RESOLVED | Noted: 2017-10-15 | Resolved: 2017-10-16

## 2017-10-16 LAB
ATRIAL RATE: 92 BPM
BACTERIA UR QL AUTO: ABNORMAL /HPF
BILIRUB UR QL STRIP: NEGATIVE
CLARITY UR: CLEAR
COLOR UR: YELLOW
GLUCOSE UR STRIP-MCNC: NEGATIVE MG/DL
HGB UR QL STRIP.AUTO: ABNORMAL
KETONES UR STRIP-MCNC: NEGATIVE MG/DL
L PNEUMO1 AG UR QL IA.RAPID: NEGATIVE
LEUKOCYTE ESTERASE UR QL STRIP: NEGATIVE
MUCOUS THREADS UR QL AUTO: ABNORMAL
NITRITE UR QL STRIP: NEGATIVE
NON-SQ EPI CELLS URNS QL MICRO: ABNORMAL /HPF
P AXIS: 70 DEGREES
PH UR STRIP.AUTO: 7 [PH] (ref 4.5–8)
PR INTERVAL: 148 MS
PROT UR STRIP-MCNC: NEGATIVE MG/DL
QRS AXIS: 65 DEGREES
QRSD INTERVAL: 84 MS
QT INTERVAL: 338 MS
QTC INTERVAL: 417 MS
RBC #/AREA URNS AUTO: ABNORMAL /HPF
S PNEUM AG UR QL: NEGATIVE
SP GR UR STRIP.AUTO: 1.01 (ref 1–1.03)
T WAVE AXIS: 48 DEGREES
UROBILINOGEN UR QL STRIP.AUTO: 0.2 E.U./DL
VENTRICULAR RATE: 92 BPM
WBC #/AREA URNS AUTO: ABNORMAL /HPF

## 2017-10-16 PROCEDURE — 81001 URINALYSIS AUTO W/SCOPE: CPT | Performed by: PHYSICIAN ASSISTANT

## 2017-10-16 PROCEDURE — 90686 IIV4 VACC NO PRSV 0.5 ML IM: CPT | Performed by: INTERNAL MEDICINE

## 2017-10-16 PROCEDURE — 94760 N-INVAS EAR/PLS OXIMETRY 1: CPT

## 2017-10-16 PROCEDURE — 94761 N-INVAS EAR/PLS OXIMETRY MLT: CPT

## 2017-10-16 PROCEDURE — 94640 AIRWAY INHALATION TREATMENT: CPT

## 2017-10-16 PROCEDURE — 87449 NOS EACH ORGANISM AG IA: CPT | Performed by: INTERNAL MEDICINE

## 2017-10-16 RX ORDER — NICOTINE 21 MG/24HR
1 PATCH, TRANSDERMAL 24 HOURS TRANSDERMAL DAILY
Qty: 28 PATCH | Refills: 0 | Status: SHIPPED | OUTPATIENT
Start: 2017-10-17 | End: 2019-04-03 | Stop reason: ALTCHOICE

## 2017-10-16 RX ORDER — LEVOFLOXACIN 500 MG/1
500 TABLET, FILM COATED ORAL DAILY
Qty: 5 TABLET | Refills: 0 | Status: SHIPPED | OUTPATIENT
Start: 2017-10-16 | End: 2017-10-21

## 2017-10-16 RX ADMIN — IPRATROPIUM BROMIDE AND ALBUTEROL SULFATE 3 ML: 2.5; .5 SOLUTION RESPIRATORY (INHALATION) at 07:33

## 2017-10-16 RX ADMIN — INFLUENZA VIRUS VACCINE 0.5 ML: 15; 15; 15; 15 SUSPENSION INTRAMUSCULAR at 12:35

## 2017-10-16 NOTE — SOCIAL WORK
Met with patient  Explained role of care management  Patient lives in a two story home with his spouse and 2 children  He is independent adl's, drives, works  DME - denies    Past services - denies  He plans on returning home and does not anticipate any discharge needs  Will follow

## 2017-10-16 NOTE — PROGRESS NOTES
Assessment  1  Fever (780 60) (R50 9)   2  Community acquired pneumonia (5) (J18 9)   3  Generalized weakness (780 79) (R53 1)    Plan  Generalized weakness    · *1 - [facility] (EMERGENCY MEDICINE ) Co-Management  *  Status: Hold For -  Scheduling  Requested for: [id number]  Care Summary provided  : Yes    Discussion/Summary  Discussion Summary:   CXR: RLL pneumoniaappears ill suspect early Sepsis  to ER at [facility]  Understands and agrees with treatment plan: The treatment plan was reviewed with the patient/guardian  The patient/guardian understands and agrees with the treatment plan      Chief Complaint  1  Fever  Chief Complaint Free Text Note Form: At Drs on thursday for Left ear infection/fluid on ear and flu symptoms  Given antibiotic  C/o fever 102, chills, can't sleep, fatigue, headache, achy all over   States hasn't chewed tobacco for days so unsure if any symptoms are from that  History of Present Illness  HPI: Pt has had body aches, fever and productive cough, saw PCP thursday and stared on zithromax (day 3)  no improvement in sx, continues with fever and productive cough, headache, mild photophobia  No meningeal signs  Temp 103  Tylenol given  No sob or CP  cold, chills, unsteady on his feet  ox 95% RA, 93% with ambulation   Hospital Based Practices Required Assessment:    Prefered Language is  english  Primary Language is  english  Review of Systems  Focused-Male:   Constitutional: as noted in HPI    ENT: no complaints of earache, no loss of hearing, no nosebleeds or nasal discharge, no sore throat or hoarseness  Cardiovascular: no complaints of slow or fast heart rate, no chest pain, no palpitations, no leg claudication or lower extremity edema  Respiratory: as noted in HPI  Gastrointestinal: no complaints of abdominal pain, no constipation, no nausea or vomiting, no diarrhea or bloody stools  Active Problems  1  Acute left otitis media (382 9) (H66 92)   2   History of Aftercare following surgery of the musculoskeletal system (V58 78) (Z47 89)   3  Allergic cough (786 2) (R05)   4  Pain in joint of right shoulder region (719 41) (M25 511)   5  Seasonal allergic rhinitis due to pollen (477 0) (J30 1)    Past Medical History  1  History of Acute bronchitis, unspecified organism (466 0) (J20 9)   2  History of Aftercare following surgery of the musculoskeletal system (V58 78) (Z47 89)   3  Denied: History of Alcohol abuse   4  History of Allergic conjunctivitis of both eyes (372 14) (H10 13)   5  History of Contact dermatitis due to poison ivy (692 6) (L23 7)   6  Denied: History of substance abuse   7  History of Impingement syndrome of right shoulder (726 2) (M75 41)   8  History of Incomplete tear of right rotator cuff (840 4) (M75 111)   9  Denied: History of Mental health problem   10  History of Tendinitis of right rotator cuff (726 10) (M75 81)    Family History  Mother    1  Denied: Family history of Alcohol abuse   2  Denied: Family history of substance abuse   3  Denied: Family history of Mental health problem  Father    4  Denied: Family history of Alcohol abuse   5  Denied: Family history of substance abuse   6  Denied: Family history of Mental health problem  Family History    7  Family history of Cancer   8  Family history of Melanoma    Social History   · Being A Social Drinker   · Caffeine Use   · Chewing Tobacco Loose Leaf   · Former smoker (V15 82) (U21 116)   · Marital History - Currently     Surgical History  1  History of Knee Arthroscopy (Therapeutic)   2  History of Shoulder Surgery Right    Current Meds   1  12 Hour Nasal Spray 0 05 % Nasal Solution; Therapy: (Recorded:03Jun2014) to Recorded   2  Aleve CAPS; Therapy: (Recorded:03Jun2014) to Recorded   3  Allergy 10 MG Oral Tablet; Therapy: (EEDNUIMA:97SKQ4482) to Recorded   4  Azithromycin 250 MG Oral Tablet; TAKE 2 TABLETS ON DAY 1 THEN TAKE 1 TABLET A   DAY FOR 4 DAYS;    Therapy: [id number] to (Last Rx:[id number])  Requested for: 15AUS4923 Ordered   5  Montelukast Sodium 10 MG Oral Tablet; TAKE 1 TABLET DAILY AS DIRECTED; Therapy: 54UMK1245 to (Evaluate:45Kjo4758)  Requested for: 54QOM1937; Last   Rx:[id number] Ordered    Allergies  1  Chantix TABS    Vitals  Signs   Recorded: 47IZJ9213 05:50PM   Temperature: 103 4 F  Heart Rate: 110  Respiration: 18  Systolic: 056  Diastolic: 72  Height: 6 ft 3 in  Weight: 243 lb   BMI Calculated: 30 37  BSA Calculated: 2 38  O2 Saturation: 95    Physical Exam    Constitutional   General appearance: No acute distress, well appearing and well nourished  Eyes   Conjunctiva and lids: No swelling, erythema, or discharge  Pupils and irises: Equal, round and reactive to light  Ears, Nose, Mouth, and Throat   External inspection of ears and nose: Normal     Otoscopic examination: Tympanic membrance translucent with normal light reflex  Canals patent without erythema  Nasal mucosa, septum, and turbinates: Abnormal  -- mucosal erythema and edema  Oropharynx: Normal with no erythema, edema, exudate or lesions  Pulmonary   Respiratory effort: No increased work of breathing or signs of respiratory distress  Auscultation of lungs: Abnormal  -- rhonci bilateral, coarse bs with cough bilateral    Cardiovascular   Auscultation of heart: Normal rate and rhythm, normal S1 and S2, without murmurs  Examination of extremities for edema and/or varicosities: Normal     Lymphatic   Palpation of lymph nodes in neck: No lymphadenopathy      Psychiatric   Orientation to person, place and time: Normal        Signatures   Electronically signed by : Jose Wilson DO; Oct 15 2017  9:34AM EST                       (Author)

## 2017-10-16 NOTE — RESPIRATORY THERAPY NOTE
respiratory home eval           Home Oxygen Qualifying Test       Patient name: Elly Barthel        : 1970   Date of Test:  2017  Diagnosis:      Home Oxygen Test:    **Medicare Guidelines require item(s) 1-5 on all ambulatory patients or 1 and 2 on on-ambulatory patients  1   Baseline SPO2 on Room Air at rest 97 %  If = or < 88% on room air add O2 via NC and titrate patient  Patient must be ambulated with O2 and titrated to > 88% with exertion  2   SPO2 on Oxygen at rest  %  3   SPO2 during exercise on Room Air 95% to 97 %  4   SPO2 during exercise on Oxygen % at a liter flow of   lpm     5   Exercise performed:    [x] Walking     [] Stairs     [x] Duration3 (min )     [x] Distance 350 (ft )       []  Supplemental Home Oxygen is indicated  [x]  Client does not qualify for home oxygen        Melissa Oppenheim , RT

## 2017-10-16 NOTE — DISCHARGE SUMMARY
Discharge Summary - Mario Sy 49 y o  male MRN: [id number]  Unit/Bed#: [address] [place] [address] Encounter: [id number]    Admission Date:    10/14/2017   Discharge Date:   10/16/17   Admitting Diagnosis:   Pneumonia and influenza [J11 00]  Community acquired pneumonia [J18 9]  Sepsis [facility]) [A41 9]  Admitting Provider:   Darin Lyons MD  Discharge Provider:   Gerardo Bautista MD     Primary Care Physician at Discharge:   Johanne Jean NP, ,[phone]    HPI:   41-year-old male who presented to the emergency department with fevers, generalized muscle aches  Patient will take cough which was productive of whitish sputum  For detailed HPI please refer to Dr Deepali Solorio note  Procedures Performed:   Orders Placed This Encounter   Procedures    ED ECG Documentation Only       Hospital Course:   Patient was admitted to med surgical floor and started on IV fluids and IV Levaquin for left-sided pneumonia  Patient also also started on Tamiflu for possible influenza  Patient blood culture remained negative to date  Patient's influenza PCR was negative  Patient felt much better  He remained hemodynamically stable and afebrile and wishes to be discharged home  Patient had ambulatory pulse oximetry done and did not qualify for home oxygen  Smoking cessation counseling given  Patient is started on nicotine patch  Patient is ambulating freely in hallway  Patient is seen by  and did not require any home care services  Patient will be discharged home to complete a course of Levaquin  Follow-up with PCP in 1 week  Stay hydrated  Return to ER with any worsening shortness of breath, fevers, chills or any other alarming symptoms  Consulting Providers   None  Complications:  None      Labs:   Lab Results   Component Value Date    WBC 8 81 10/15/2017    RBC 4 47 10/15/2017    HGB 13 2 10/15/2017    HCT 38 8 10/15/2017    MCV 87 10/15/2017    MCH 29 5 10/15/2017    RDW 12 5 10/15/2017     (L) 10/15/2017     Lab Results   Component Value Date    CREATININE 1 24 10/15/2017    BUN 17 10/15/2017     10/15/2017    K 4 0 10/15/2017     10/15/2017    CO2 27 10/15/2017    GLUCOSE 94 10/15/2017    PROT 5 9 (L) 10/15/2017    ALKPHOS 51 10/15/2017    ALT 24 10/15/2017    AST 18 10/15/2017       Treatments:  Levaquin, Tamiflu, nicotine patch  Discharge Diagnosis:   Principal Problem:    PNA (pneumonia)  Active Problems:    Obesity  Resolved Problems:    Influenza    Sepsis ([facility] [address] )    Hyponatremia      Condition at Discharge:   Good     Code Status: Level 1 - Full Code  Advance Directive and Living Will: <no information>  Power of :    POLST:      Discharge instructions/Information to patient and family:   See after visit summary for information provided to patient and family  Provisions for Follow-Up Care:  See after visit summary for information related to follow-up care and any pertinent home health orders  Disposition:   Home    Planned Readmission:   No    Discharge Statement   I spent 40 minutes discharging the patient  This time was spent on the day of discharge  I had direct contact with the patient on the day of discharge  Greater than 50% of the total time was spent examining patient, answering all patient questions, arranging and discussing plan of care with patient as well as directly providing post-discharge instructions  Additional time then spent on discharge activities  Discharge Medications:  See after visit summary for reconciled discharge medications provided to patient and family        "This note has been constructed using a voice recognition system"    Jimbo Galarza MD  10/16/2017,10:49 AM

## 2017-10-16 NOTE — DISCHARGE INSTRUCTIONS
Patient will be discharged home to complete a course of Levaquin  Follow-up with PCP in 1 week  Stay hydrated  Return to ER with any worsening shortness of breath, fevers, chills or any other alarming symptoms

## 2017-10-17 LAB
BACTERIA SPT RESP CULT: NORMAL
GRAM STN SPEC: NORMAL

## 2017-10-17 NOTE — CASE MANAGEMENT
Abimael Ramos MD Physician Signed Internal Medicine  Discharge Summaries Date of Service: 10/16/2017 10:49 AM         []Hide copied text  []Hover for attribution information  Discharge Summary - Daylin Wiggins 49 y o  male MRN: [id number]  Unit/Bed#: [address] [place], [place] [id number] Encounter: [id number]     Admission Date:                   10/14/2017   Discharge Date:   10/16/17   Admitting Diagnosis:   Pneumonia and influenza [J11 00]  Community acquired pneumonia [J18 9]  Sepsis [facility]) [A41 9]  Admitting Provider:   Akash Poe MD  Discharge Provider:   Abimael Ramos MD      Primary Care Physician at Discharge:   Berto Doshi NP, XT,[phone]     HPI:   69-year-old male who presented to the emergency department with fevers, generalized muscle aches  Patient will take cough which was productive of whitish sputum  For detailed HPI please refer to Dr Abiodun Ulrich note      Procedures Performed:       Orders Placed This Encounter   Procedures    ED ECG Documentation Only         Hospital Course:   Patient was admitted to med surgical floor and started on IV fluids and IV Levaquin for left-sided pneumonia  Patient also also started on Tamiflu for possible influenza  Patient blood culture remained negative to date  Patient's influenza PCR was negative  Patient felt much better  He remained hemodynamically stable and afebrile and wishes to be discharged home  Patient had ambulatory pulse oximetry done and did not qualify for home oxygen  Smoking cessation counseling given  Patient is started on nicotine patch  Patient is ambulating freely in hallway  Patient is seen by  and did not require any home care services  Patient will be discharged home to complete a course of Levaquin  Follow-up with PCP in 1 week  Stay hydrated    Return to ER with any worsening shortness of breath, fevers, chills or any other alarming symptoms      Consulting Providers   None      Complications:  None      Labs: Lab Results   Component Value Date     WBC 8 81 10/15/2017     RBC 4 47 10/15/2017     HGB 13 2 10/15/2017     HCT 38 8 10/15/2017     MCV 87 10/15/2017     MCH 29 5 10/15/2017     RDW 12 5 10/15/2017      (L) 10/15/2017            Lab Results   Component Value Date     CREATININE 1 24 10/15/2017     BUN 17 10/15/2017      10/15/2017     K 4 0 10/15/2017      10/15/2017     CO2 27 10/15/2017     GLUCOSE 94 10/15/2017     PROT 5 9 (L) 10/15/2017     ALKPHOS 51 10/15/2017     ALT 24 10/15/2017     AST 18 10/15/2017         Treatments:  Levaquin, Tamiflu, nicotine patch      Discharge Diagnosis:   Principal Problem:    PNA (pneumonia)  Active Problems:    Obesity  Resolved Problems:    Influenza    Sepsis ([facility] [address] )    Hyponatremia        Condition at Discharge:   Good      Code Status: Level 1 - Full Code  Advance Directive and Living Will: <no information>  Power of :    POLST:       Discharge instructions/Information to patient and family:   See after visit summary for information provided to patient and family        Provisions for Follow-Up Care:  See after visit summary for information related to follow-up care and any pertinent home health orders        Disposition:   Home     Planned Readmission:   No     Discharge Statement   I spent 40 minutes discharging the patient  This time was spent on the day of discharge  I had direct contact with the patient on the day of discharge  Greater than 50% of the total time was spent examining patient, answering all patient questions, arranging and discussing plan of care with patient as well as directly providing post-discharge instructions   Additional time then spent on discharge activities      Discharge Medications:  See after visit summary for reconciled discharge medications provided to patient and family        "This note has been constructed using a voice recognition system"    Frank Garcia MD  10/16/2017,10:49 AM

## 2017-10-19 LAB
BACTERIA BLD CULT: NORMAL
BACTERIA BLD CULT: NORMAL

## 2018-01-03 ENCOUNTER — TRANSCRIBE ORDERS (OUTPATIENT)
Dept: ADMINISTRATIVE | Facility: HOSPITAL | Age: 48
End: 2018-01-03

## 2018-01-03 ENCOUNTER — HOSPITAL ENCOUNTER (OUTPATIENT)
Dept: RADIOLOGY | Facility: HOSPITAL | Age: 48
Discharge: HOME | End: 2018-01-03
Payer: COMMERCIAL

## 2018-01-03 ENCOUNTER — ALLSCRIPTS OFFICE VISIT (OUTPATIENT)
Dept: OTHER | Facility: OTHER | Age: 48
End: 2018-01-03

## 2018-01-03 ENCOUNTER — GENERIC CONVERSION - ENCOUNTER (OUTPATIENT)
Dept: OTHER | Facility: OTHER | Age: 48
End: 2018-01-03

## 2018-01-03 DIAGNOSIS — J18.9 PNEUMONIA: ICD-10-CM

## 2018-01-03 PROCEDURE — 71046 X-RAY EXAM CHEST 2 VIEWS: CPT

## 2018-01-04 NOTE — PROGRESS NOTES
Assessment   1  Cough (786 2) (R05)    Plan    * XR CHEST PA & LATERAL; Status:Resulted - Requires Verification;   Done: [id number] 12:00AM     PLQ:15LJW2756; Ordered; Stat;       For:Community acquired pneumonia; Ordered By:Rowena Busch;       Discussion/Summary      Cough for only 3 days with normal exam   CAP with no repeat chest xray  Will check xray today  negative will monitor cough  The treatment plan was reviewed with the patient/guardian  The patient/guardian understands and agrees with the treatment plan      Chief Complaint   Pt is here with c/o cough      History of Present Illness   HPI: Has tightness in chest with cough  Started 3 days ago  Had pneumonia in October  Symptoms resolved  No repeat chest xray  Denies sob  Gets wheezy at night before coughing  Denies fever,chills  Feels like a pulled muscle in chest  Cough started before chest tightness  Throat is dry  Has slight nasal congestion  Denies fever and chills  Review of Systems        Constitutional: as noted in HPI       ENT: as noted in HPI  Respiratory: as noted in HPI  Active Problems   1  Acute left otitis media (382 9) (H66 92)   2  History of Aftercare following surgery of the musculoskeletal system (V58 78) (Z47 89)   3  Allergic cough (786 2) (R05)   4  Community acquired pneumonia (5) (J18 9)   5  Fever (780 60) (R50 9)   6  Generalized weakness (780 79) (R53 1)   7  Pain in joint of right shoulder region (719 41) (M25 511)   8  Seasonal allergic rhinitis due to pollen (477 0) (J30 1)    Past Medical History   1  History of Acute bronchitis, unspecified organism (466 0) (J20 9)   2  History of Aftercare following surgery of the musculoskeletal system (V58 78) (Z47 89)   3  Denied: History of Alcohol abuse   4  History of Allergic conjunctivitis of both eyes (372 14) (H10 13)   5  History of Contact dermatitis due to poison ivy (692 6) (L23 7)   6  Denied: History of substance abuse   7   History of Impingement syndrome of right shoulder (726 2) (M75 41)   8  History of Incomplete tear of right rotator cuff (840 4) (M75 111)   9  Denied: History of Mental health problem   10  History of Tendinitis of right rotator cuff (726 10) (M75 81)    Family History   Mother    1  Denied: Family history of Alcohol abuse   2  Denied: Family history of substance abuse   3  Denied: Family history of Mental health problem  Father    4  Denied: Family history of Alcohol abuse   5  Denied: Family history of substance abuse   6  Denied: Family history of Mental health problem  Family History    7  Family history of Cancer   8  Family history of Melanoma    Social History    · Being A Social Drinker   · Caffeine Use   · Chewing Tobacco Loose Leaf   · Former smoker (V15 82) (J74 152)   · Marital History - Currently   The social history was reviewed and updated today  The social history was reviewed and is unchanged  Surgical History   1  History of Knee Arthroscopy (Therapeutic)   2  History of Shoulder Surgery Right    Current Meds    1  12 Hour Nasal Spray 0 05 % Nasal Solution; Therapy: (Recorded:03Jun2014) to Recorded   2  Aleve CAPS; Therapy: (Recorded:03Jun2014) to Recorded   3  Allergy 10 MG Oral Tablet; Therapy: (SKPLJGJU:52BRW6145) to Recorded   4  Azithromycin 250 MG Oral Tablet; TAKE 2 TABLETS ON DAY 1 THEN TAKE 1 TABLET A     DAY FOR 4 DAYS; Therapy: 98YAI4103 to (Last Rx:12Oct2017)  Requested for: 12Oct2017 Ordered   5  Montelukast Sodium 10 MG Oral Tablet; TAKE 1 TABLET DAILY AS DIRECTED; Therapy: 99TFV2137 to (Evaluate:23Qfh3788)  Requested for: 42BBU3163; Last     Rx:05Jun2017 Ordered     The medication list was reviewed and updated today  Allergies   1   Chantix TABS    Vitals    Recorded: 73QYU4667 02:07PM   Temperature 98 4 F, Tympanic   Heart Rate 84, L Radial   Pulse Quality Regular, L Radial   Systolic 016, LUE, Sitting   Diastolic 72, LUE, Sitting   Height 6 ft 3 in   Weight 255 lb 9 6 oz   BMI Calculated 31 95   BSA Calculated 2 44     Physical Exam        Constitutional      General appearance: No acute distress, well appearing and well nourished  Ears, Nose, Mouth, and Throat      External inspection of ears and nose: Normal        Otoscopic examination: Tympanic membrance translucent with normal light reflex  Canals patent without erythema  Oropharynx: Normal with no erythema, edema, exudate or lesions  Pulmonary      Respiratory effort: No increased work of breathing or signs of respiratory distress  Auscultation of lungs: Clear to auscultation, equal breath sounds bilaterally, no wheezes, no rales, no rhonci  Cardiovascular      Auscultation of heart: Normal rate and rhythm, normal S1 and S2, without murmurs  Lymphatic      Palpation of lymph nodes in neck: No lymphadenopathy         Psychiatric      Orientation to person, place and time: Normal        Mood and affect: Normal           Signatures    Electronically signed by : Michel Frazier; Toñ[place]  3 2018  2:24PM EST                       (Author)     Electronically signed by : Nika Rossi DO; Toñ[place]  3 2018  9:20PM EST                       (Author)

## 2018-01-13 VITALS
DIASTOLIC BLOOD PRESSURE: 80 MMHG | WEIGHT: 248.4 LBS | HEART RATE: 82 BPM | HEIGHT: 75 IN | SYSTOLIC BLOOD PRESSURE: 128 MMHG | BODY MASS INDEX: 30.88 KG/M2 | TEMPERATURE: 99.9 F

## 2018-01-14 VITALS
BODY MASS INDEX: 31.13 KG/M2 | TEMPERATURE: 98.4 F | HEIGHT: 75 IN | SYSTOLIC BLOOD PRESSURE: 126 MMHG | HEART RATE: 80 BPM | DIASTOLIC BLOOD PRESSURE: 74 MMHG | WEIGHT: 250.4 LBS

## 2018-01-14 VITALS
WEIGHT: 245 LBS | BODY MASS INDEX: 30.46 KG/M2 | TEMPERATURE: 97.5 F | HEIGHT: 75 IN | HEART RATE: 82 BPM | SYSTOLIC BLOOD PRESSURE: 138 MMHG | DIASTOLIC BLOOD PRESSURE: 82 MMHG

## 2018-01-15 VITALS
WEIGHT: 247.8 LBS | TEMPERATURE: 98.1 F | HEART RATE: 76 BPM | HEIGHT: 75 IN | DIASTOLIC BLOOD PRESSURE: 74 MMHG | SYSTOLIC BLOOD PRESSURE: 126 MMHG | BODY MASS INDEX: 30.81 KG/M2

## 2018-01-16 NOTE — RESULT NOTES
Verified Results  * XR CHEST PA & LATERAL [id number] 10:07AM Bertin Hernandez Order Number: [id number]     Test Name Result Flag Reference   XR CHEST PA & LATERAL (Report)     CHEST      INDICATION: Cough and fever  COMPARISON: None     VIEWS: Frontal and lateral projections     IMAGES: 2     FINDINGS:        Cardiomediastinal silhouette appears unremarkable  Calcified right lower lobe granuloma is noted  The lungs are otherwise clear  No pneumothorax or pleural effusion  Visualized osseous structures appear within normal limits for the patient's age  IMPRESSION:     No active pulmonary disease         Workstation performed: [id number]     Signed by:   Jose Alejandro James MD   6/7/17          Patient notified of results

## 2018-01-22 VITALS
BODY MASS INDEX: 31.78 KG/M2 | TEMPERATURE: 98.4 F | HEIGHT: 75 IN | HEART RATE: 84 BPM | WEIGHT: 255.6 LBS | SYSTOLIC BLOOD PRESSURE: 128 MMHG | DIASTOLIC BLOOD PRESSURE: 72 MMHG

## 2018-01-23 NOTE — RESULT NOTES
Discussion/Summary    Please call pt and let him know chest xray was clear of pneumonia  If cough worsens or no improvement in 2 weeks should let us know  Pt aware  Verified Results  * XR CHEST PA & LATERAL [id number] 02:28PM Shasta Roy Order Number: [id number]     Test Name Result Flag Reference   XR CHEST PA & LATERAL (Report)     CHEST - DUAL ENERGY     INDICATION: Chest tightness  Cough  COMPARISON: 10/14/2017  VIEWS: PA (including soft tissue/bone algorithms) and lateral projections     IMAGES: 5     FINDINGS:        Cardiomediastinal silhouette appears unremarkable  The lungs are clear  No pneumothorax or pleural effusion  Calcified granulomas project over the right hilum and right lung base  Stable bony structures  IMPRESSION:     No active pulmonary disease         Workstation performed: [id number]     Signed by:   Khadijah Nolan DO   1/3/18

## 2018-01-31 ENCOUNTER — OFFICE VISIT (OUTPATIENT)
Dept: FAMILY MEDICINE CLINIC | Facility: HOSPITAL | Age: 48
End: 2018-01-31
Payer: COMMERCIAL

## 2018-01-31 VITALS
BODY MASS INDEX: 34.65 KG/M2 | SYSTOLIC BLOOD PRESSURE: 126 MMHG | HEART RATE: 80 BPM | WEIGHT: 255.8 LBS | DIASTOLIC BLOOD PRESSURE: 74 MMHG | HEIGHT: 72 IN | TEMPERATURE: 97.6 F

## 2018-01-31 DIAGNOSIS — R68.89 FLU-LIKE SYMPTOMS: Primary | ICD-10-CM

## 2018-01-31 PROBLEM — J18.9 PNA (PNEUMONIA): Status: RESOLVED | Noted: 2017-10-14 | Resolved: 2018-01-31

## 2018-01-31 PROCEDURE — 99214 OFFICE O/P EST MOD 30 MIN: CPT | Performed by: NURSE PRACTITIONER

## 2018-01-31 RX ORDER — COVID-19 ANTIGEN TEST
KIT MISCELLANEOUS
COMMUNITY
End: 2018-05-15

## 2018-01-31 RX ORDER — OSELTAMIVIR PHOSPHATE 75 MG/1
75 CAPSULE ORAL EVERY 12 HOURS SCHEDULED
Qty: 10 CAPSULE | Refills: 0 | Status: SHIPPED | OUTPATIENT
Start: 2018-01-31 | End: 2018-02-05

## 2018-01-31 RX ORDER — LORATADINE 10 MG/1
TABLET ORAL
COMMUNITY
End: 2019-02-14

## 2018-01-31 RX ORDER — MONTELUKAST SODIUM 10 MG/1
1 TABLET ORAL DAILY
COMMUNITY
Start: 2017-06-05 | End: 2018-05-15

## 2018-01-31 NOTE — PROGRESS NOTES
Assessment/Plan:    Flu like symptoms likely influenza so will treat as such  Instructed to call with no improvement in 7-10 days or any worsening  Diagnoses and all orders for this visit:    Flu-like symptoms  -     oseltamivir (TAMIFLU) 75 mg capsule; Take 1 capsule (75 mg total) by mouth every 12 (twelve) hours for 5 days    Other orders  -     loratadine (CLARITIN) 10 mg tablet; Take by mouth  -     montelukast (SINGULAIR) 10 mg tablet; Take 1 tablet by mouth daily  -     Oxymetazoline HCl (NASAL SPRAY NA); into each nostril  -     Naproxen Sodium (ALEVE) 220 MG CAPS; Take by mouth          Subjective:      Patient ID: Evy Cosme is a 51 y o  male  Feels like crap  Started with runny nose 2 days ago  Last night head congestion  This morning has tighness in chest and coughing up mucus  Taking Theraflu so unsure of fever  All over body aches  Had flu shot  Eras are clogged  Left ear itchy  Post nasal drip causing sore throat  Denies sob and wheezing  Denies N/V/D  The following portions of the patient's history were reviewed and updated as appropriate: allergies, current medications, past medical history, past social history and problem list     Review of Systems   Constitutional: Positive for fatigue  Negative for chills and fever  HENT: Positive for congestion, postnasal drip and sinus pressure  Negative for ear pain and sore throat  Respiratory: Positive for cough and chest tightness  Negative for shortness of breath and wheezing  Gastrointestinal: Negative for diarrhea, nausea and vomiting  Musculoskeletal: Positive for arthralgias and myalgias  Objective:     Physical Exam   Constitutional: He is oriented to person, place, and time  He appears well-developed and well-nourished  HENT:   Head: Normocephalic     Right Ear: Tympanic membrane and external ear normal    Left Ear: Tympanic membrane and external ear normal    Mouth/Throat: Oropharynx is clear and moist  Cardiovascular: Normal rate, regular rhythm and normal heart sounds  Pulmonary/Chest: Effort normal and breath sounds normal    Dry cough noted   Lymphadenopathy:     He has no cervical adenopathy  Neurological: He is alert and oriented to person, place, and time  Skin: Skin is warm and dry  Psychiatric: He has a normal mood and affect

## 2018-05-15 ENCOUNTER — OFFICE VISIT (OUTPATIENT)
Dept: FAMILY MEDICINE CLINIC | Facility: HOSPITAL | Age: 48
End: 2018-05-15
Payer: COMMERCIAL

## 2018-05-15 ENCOUNTER — HOSPITAL ENCOUNTER (OUTPATIENT)
Dept: RADIOLOGY | Facility: HOSPITAL | Age: 48
Discharge: HOME | End: 2018-05-15
Payer: COMMERCIAL

## 2018-05-15 VITALS
HEIGHT: 72 IN | DIASTOLIC BLOOD PRESSURE: 80 MMHG | BODY MASS INDEX: 33.86 KG/M2 | WEIGHT: 250 LBS | HEART RATE: 82 BPM | TEMPERATURE: 98.6 F | SYSTOLIC BLOOD PRESSURE: 130 MMHG

## 2018-05-15 DIAGNOSIS — M25.561 CHRONIC PAIN OF RIGHT KNEE: Primary | ICD-10-CM

## 2018-05-15 DIAGNOSIS — G89.29 CHRONIC PAIN OF RIGHT ANKLE: ICD-10-CM

## 2018-05-15 DIAGNOSIS — M25.571 CHRONIC PAIN OF RIGHT ANKLE: ICD-10-CM

## 2018-05-15 DIAGNOSIS — M25.561 CHRONIC PAIN OF RIGHT KNEE: ICD-10-CM

## 2018-05-15 DIAGNOSIS — G89.29 CHRONIC PAIN OF RIGHT KNEE: ICD-10-CM

## 2018-05-15 DIAGNOSIS — G89.29 CHRONIC PAIN OF RIGHT KNEE: Primary | ICD-10-CM

## 2018-05-15 PROCEDURE — 73562 X-RAY EXAM OF KNEE 3: CPT

## 2018-05-15 PROCEDURE — 99214 OFFICE O/P EST MOD 30 MIN: CPT | Performed by: INTERNAL MEDICINE

## 2018-05-15 RX ORDER — MELOXICAM 7.5 MG/1
TABLET ORAL
Qty: 30 TABLET | Refills: 0 | Status: SHIPPED | OUTPATIENT
Start: 2018-05-15 | End: 2018-07-10 | Stop reason: SDUPTHER

## 2018-05-15 NOTE — PROGRESS NOTES
Assessment/Plan:    Chronic pain of right knee  Likely acute inflammation on top of chronic DJD d/t mult old foot ball injuries - some abnormality on exam so will start with Xray and refer to Ortho, no benefit with OTC NSAIDs so will try trial of Mobic, heat/ice        -     XR knee 3 vw right non injury; Future  -     meloxicam (MOBIC) 7 5 mg tablet; 1 tab PO bid prn  -     Ambulatory referral to Orthopedic Surgery; Future    Chronic pain of right ankle  Comments:  likely chronic pain d/t abnormal gait d/t chronic knee pain, ice and Mobic, referral to Ortho  Orders:  -     meloxicam (MOBIC) 7 5 mg tablet; 1 tab PO bid prn  -     Ambulatory referral to Orthopedic Surgery; Future          Subjective:      Patient ID: Davian Bernard is a 51 y o  male  HPI  Pt here with c/o chronic R knee pain since foot ball injury years and years ago  He has been using Aleve prn  He notes the R knee pain has worsened over the past 2-3 mos  The pain is below the R knee cap and R lateral knee area  The pain is a constant pain but is worse with being on his feet a lot and after sitting for long periods and getting up and walking around  He notes intermittent swelling if he overuses the knee  He notes no locking up or giving out  He has noted R ankle pain the past 2-3 mos in his R lateral ankle as well  He has some pain at the bottom of his heel as well  He notes intermittent swelling but no redness/warmth  He is using Aleve and Ibuprofen with some benefit in the past - no current benefit       Review of Systems   Constitutional: Negative for chills and fatigue  Respiratory: Negative for cough and shortness of breath  Cardiovascular: Negative for chest pain and palpitations  Musculoskeletal: Positive for arthralgias and joint swelling  Skin: Negative for rash and wound  Neurological: Negative for weakness and numbness           Objective:    /80   Pulse 82   Temp 98 6 °F (37 °C)   Ht 6' (1 829 m)   [employer] 113 kg (250 lb)   BMI 33 91 kg/m²      Physical Exam   Constitutional: He appears well-developed and well-nourished  No distress  HENT:   Head: Normocephalic and atraumatic  Eyes: Conjunctivae are normal  Right eye exhibits no discharge  Left eye exhibits no discharge  Neck: Neck supple  No tracheal deviation present  Cardiovascular: Normal rate and regular rhythm  No murmur heard  Pulmonary/Chest: Effort normal and breath sounds normal  No respiratory distress  He has no wheezes  He has no rales  Musculoskeletal: He exhibits no edema  Ambulates with limp;  R knee: + mild effusion laterally, + crepitus with PROM, neg ant/post drawer test + pain with varus stress only  R ankle: + tenderness inferoposterior to lateral malleoli, no swelling/redness/warmth noted, + pain with inversion only   Neurological: He is alert  He exhibits normal muscle tone  Skin: Skin is warm and dry  No rash noted  Psychiatric: He has a normal mood and affect  His behavior is normal    Nursing note and vitals reviewed

## 2018-05-15 NOTE — ASSESSMENT & PLAN NOTE
Likely acute inflammation on top of chronic DJD d/t mult old foot ball injuries - some abnormality on exam so will start with Xray and refer to Ortho, no benefit with OTC NSAIDs so will try trial of Mobic, heat/ice

## 2018-05-17 ENCOUNTER — OFFICE VISIT (OUTPATIENT)
Dept: OBGYN CLINIC | Facility: CLINIC | Age: 48
End: 2018-05-17
Payer: COMMERCIAL

## 2018-05-17 VITALS
BODY MASS INDEX: 34.27 KG/M2 | DIASTOLIC BLOOD PRESSURE: 80 MMHG | SYSTOLIC BLOOD PRESSURE: 118 MMHG | HEIGHT: 72 IN | HEART RATE: 80 BPM | WEIGHT: 253 LBS

## 2018-05-17 DIAGNOSIS — M76.71 PERONEAL TENDONITIS, RIGHT: ICD-10-CM

## 2018-05-17 DIAGNOSIS — M17.31 POST-TRAUMATIC OSTEOARTHRITIS OF RIGHT KNEE: ICD-10-CM

## 2018-05-17 PROCEDURE — 99204 OFFICE O/P NEW MOD 45 MIN: CPT | Performed by: ORTHOPAEDIC SURGERY

## 2018-05-17 PROCEDURE — 20610 DRAIN/INJ JOINT/BURSA W/O US: CPT | Performed by: ORTHOPAEDIC SURGERY

## 2018-05-17 RX ORDER — BETAMETHASONE SODIUM PHOSPHATE AND BETAMETHASONE ACETATE 3; 3 MG/ML; MG/ML
6 INJECTION, SUSPENSION INTRA-ARTICULAR; INTRALESIONAL; INTRAMUSCULAR; SOFT TISSUE
Status: COMPLETED | OUTPATIENT
Start: 2018-05-17 | End: 2018-05-17

## 2018-05-17 RX ORDER — LIDOCAINE HYDROCHLORIDE 10 MG/ML
7 INJECTION, SOLUTION EPIDURAL; INFILTRATION; INTRACAUDAL; PERINEURAL ONCE
Status: DISCONTINUED | OUTPATIENT
Start: 2018-05-17 | End: 2019-06-03

## 2018-05-17 RX ORDER — BETAMETHASONE SODIUM PHOSPHATE AND BETAMETHASONE ACETATE 3; 3 MG/ML; MG/ML
6 INJECTION, SUSPENSION INTRA-ARTICULAR; INTRALESIONAL; INTRAMUSCULAR; SOFT TISSUE ONCE
Status: DISCONTINUED | OUTPATIENT
Start: 2018-05-17 | End: 2019-06-03

## 2018-05-17 RX ORDER — LIDOCAINE HYDROCHLORIDE 10 MG/ML
7 INJECTION, SOLUTION EPIDURAL; INFILTRATION; INTRACAUDAL; PERINEURAL
Status: COMPLETED | OUTPATIENT
Start: 2018-05-17 | End: 2018-05-17

## 2018-05-17 RX ADMIN — BETAMETHASONE SODIUM PHOSPHATE AND BETAMETHASONE ACETATE 6 MG: 3; 3 INJECTION, SUSPENSION INTRA-ARTICULAR; INTRALESIONAL; INTRAMUSCULAR; SOFT TISSUE at 12:25

## 2018-05-17 RX ADMIN — LIDOCAINE HYDROCHLORIDE 7 ML: 10 INJECTION, SOLUTION EPIDURAL; INFILTRATION; INTRACAUDAL; PERINEURAL at 12:25

## 2018-05-17 NOTE — PROGRESS NOTES
Assessment:     1  Post-traumatic osteoarthritis of right knee    2  Peroneal tendonitis, right        Plan:     Problem List Items Addressed This Visit        Musculoskeletal and Integument    Post-traumatic osteoarthritis of right knee     Findings consistent with right knee posttraumatic osteoarthritis  Discussed findings and treatment options with the patient  I reviewed patient's right knee x-ray with him and his wife  I discussed prognosis of his right knee condition  I provided patient cortisone injection in the right knee, which patient tolerated well  I advised patient to take the Mobic medication that was prescribed  I advised patient to do low-impact exercises with stationary bike or swimming  He should try to avoid high impact, repetitive squatting, kneeling, and climbing activities  I provide patient a script for obtaining a medial off- brace  I will refer patient to physical therapy for knee rehabilitation  I discussed with patient physical joint supplement injections if his symptoms persist despite conservative treatment  Cold compress over the right knee today  All patient's questions were answered to his satisfaction  This note is created using dictation transcription  It may contain typographical errors, grammatical errors, improperly dictated words, background noise and other errors  Relevant Medications    betamethasone acetate-betamethasone sodium phosphate (CELESTONE) injection 6 mg    lidocaine (PF) (XYLOCAINE-MPF) 1 % injection 7 mL    Other Relevant Orders    Medial  Knee Brace    Ambulatory referral to Physical Therapy    Peroneal tendonitis, right     Right peroneal tendinitis  Discussed treatment options with the patient  I provided him a prescription for obtaining an ankle brace for supporting  I will refer patient to physical therapy for rehabilitation of his ankle  Continue Mobic medication    I discussed using something more rigid if his symptoms persist despite the treatment  I will see patient back in 6-8 weeks for re-evaluation  Relevant Orders    Ambulatory referral to Physical Therapy    Brace         Subjective:     Patient ID: Marie Knight is a 51 y o  male  Chief Complaint:  40-year-old white male with history of bilateral knee arthroscopy due to meniscal tear  Patient attributed his symptoms to football injury in the past   He has had intermittent pain in his knee  The right knee has been getting worse recently  He is experiencing stiffness in the morning and after prolonged sitting  He has increased pain with prolonged walking  He has difficulty with squatting, kneeling, and climbing activities  He has not been able to high impact activities due to pain in his knee  Patient has not had any treatment until he visited his PCP  He was prescribed Mobic  He denies any injury recently  He has no locking or giving way sensation  He is complaining of pain diffusely but mostly over the inner aspect  Pain is mostly dull aching with occasional shooting pain  Information on patient's intake form was reviewed  Allergy:  Allergies   Allergen Reactions    Chantix [Varenicline] Palpitations     Medications:  all current active meds have been reviewed  Past Medical History:  History reviewed  No pertinent past medical history  Past Surgical History:  History reviewed  No pertinent surgical history  Family History:  Family History   Problem Relation Age of Onset    Alcohol abuse Mother      denies    Substance Abuse Mother      denies    Mental illness Mother      denies     Social History:  History   Alcohol Use    Yes     History   Drug Use No     History   Smoking Status    Former Smoker   Smokeless Tobacco    Current User     Review of Systems   Constitutional: Negative  HENT: Negative  Eyes: Negative  Respiratory: Negative  Cardiovascular: Negative  Gastrointestinal: Negative  Endocrine: Negative  Genitourinary: Negative  Musculoskeletal: Positive for arthralgias (Right knee and right ankle)  Negative for gait problem and joint swelling  Skin: Negative  Neurological: Negative  Hematological: Negative  Psychiatric/Behavioral: Negative  Objective:  BP Readings from Last 1 Encounters:   05/17/18 118/80      Wt Readings from Last 1 Encounters:   05/17/18 115 kg (253 lb)      BMI:   Estimated body mass index is 34 31 kg/m² as calculated from the following:    Height as of this encounter: 6' (1 829 m)  Weight as of this encounter: 115 kg (253 lb)  BSA:   Estimated body surface area is 2 36 meters squared as calculated from the following:    Height as of this encounter: 6' (1 829 m)  Weight as of this encounter: 115 kg (253 lb)  Physical Exam   Constitutional: He is oriented to person, place, and time  He appears well-developed  HENT:   Head: Normocephalic and atraumatic  Eyes: EOM are normal  Pupils are equal, round, and reactive to light  Neck: Neck supple  Musculoskeletal:        Right knee: He exhibits no effusion  Neurological: He is alert and oriented to person, place, and time  Skin: Skin is warm  Psychiatric: He has a normal mood and affect  Nursing note and vitals reviewed  Right Ankle Exam   Swelling: none    Tenderness   Right ankle tenderness location: Peroneal tendon posterior to lateral malleolus  Range of Motion   The patient has normal right ankle ROM  Muscle Strength   The patient has normal right ankle strength  Tests   Anterior drawer: negative  Other   Erythema: absent  Sensation: normal  Pulse: present       Right Knee Exam     Tenderness   The patient is experiencing tenderness in the lateral joint line, medial joint line and pes anserinus  Muscle Strength     The patient has normal right knee strength      Tests   Danielle:  Medial - negative Lateral - negative  Varus: negative  Valgus: negative  Patellar Apprehension: negative    Other   Erythema: absent  Sensation: normal  Pulse: present  Swelling: none  Other tests: no effusion present    Comments:  Slight varus alignment            I have personally reviewed pertinent films in PACS and my interpretation is Left knee show varus alignment  Osteoarthritis in all 3 compartments with marginal osteophyte  No soft tissue calcification       Large joint arthrocentesis  Date/Time: 5/17/2018 12:25 PM  Consent given by: patient  Site marked: site marked  Timeout: Immediately prior to procedure a time out was called to verify the correct patient, procedure, equipment, support staff and site/side marked as required   Supporting Documentation  Indications: pain   Procedure Details  Location: knee - R knee  Preparation: Patient was prepped and draped in the usual sterile fashion  Needle size: 22 G  Ultrasound guidance: no  Approach: anterolateral  Medications administered: 7 mL lidocaine (PF) 1 %; 6 mg betamethasone acetate-betamethasone sodium phosphate 6 (3-3) mg/mL    Patient tolerance: patient tolerated the procedure well with no immediate complications  Dressing:  Sterile dressing applied

## 2018-05-17 NOTE — ASSESSMENT & PLAN NOTE
Findings consistent with right knee posttraumatic osteoarthritis  Discussed findings and treatment options with the patient  I reviewed patient's right knee x-ray with him and his wife  I discussed prognosis of his right knee condition  I provided patient cortisone injection in the right knee, which patient tolerated well  I advised patient to take the Mobic medication that was prescribed  I advised patient to do low-impact exercises with stationary bike or swimming  He should try to avoid high impact, repetitive squatting, kneeling, and climbing activities  I provide patient a script for obtaining a medial off- brace  I will refer patient to physical therapy for knee rehabilitation  I discussed with patient physical joint supplement injections if his symptoms persist despite conservative treatment  Cold compress over the right knee today  All patient's questions were answered to his satisfaction  This note is created using dictation transcription  It may contain typographical errors, grammatical errors, improperly dictated words, background noise and other errors

## 2018-05-17 NOTE — ASSESSMENT & PLAN NOTE
Right peroneal tendinitis  Discussed treatment options with the patient  I provided him a prescription for obtaining an ankle brace for supporting  I will refer patient to physical therapy for rehabilitation of his ankle  Continue Mobic medication  I discussed using something more rigid if his symptoms persist despite the treatment  I will see patient back in 6-8 weeks for re-evaluation

## 2018-05-22 ENCOUNTER — TELEPHONE (OUTPATIENT)
Dept: OBGYN CLINIC | Facility: HOSPITAL | Age: 48
End: 2018-05-22

## 2018-05-22 NOTE — TELEPHONE ENCOUNTER
Dr Singh Certain patient - Knee pain    Patient was asking for help with what he can do for pain in addition to the meloxicam   I advised Tylenol 1000mg TID and Ice 20 min on 20 min off  Verbalized understanding

## 2018-07-10 ENCOUNTER — OFFICE VISIT (OUTPATIENT)
Dept: OBGYN CLINIC | Facility: CLINIC | Age: 48
End: 2018-07-10
Payer: COMMERCIAL

## 2018-07-10 VITALS
BODY MASS INDEX: 34.67 KG/M2 | SYSTOLIC BLOOD PRESSURE: 129 MMHG | DIASTOLIC BLOOD PRESSURE: 82 MMHG | HEART RATE: 76 BPM | WEIGHT: 256 LBS | HEIGHT: 72 IN

## 2018-07-10 DIAGNOSIS — M17.31 POST-TRAUMATIC OSTEOARTHRITIS OF RIGHT KNEE: ICD-10-CM

## 2018-07-10 DIAGNOSIS — M25.561 CHRONIC PAIN OF RIGHT KNEE: ICD-10-CM

## 2018-07-10 DIAGNOSIS — G89.29 CHRONIC PAIN OF RIGHT ANKLE: ICD-10-CM

## 2018-07-10 DIAGNOSIS — G89.29 CHRONIC PAIN OF RIGHT KNEE: ICD-10-CM

## 2018-07-10 DIAGNOSIS — M25.571 CHRONIC PAIN OF RIGHT ANKLE: ICD-10-CM

## 2018-07-10 DIAGNOSIS — M76.71 PERONEAL TENDONITIS, RIGHT: Primary | ICD-10-CM

## 2018-07-10 PROCEDURE — 99213 OFFICE O/P EST LOW 20 MIN: CPT | Performed by: ORTHOPAEDIC SURGERY

## 2018-07-10 RX ORDER — MELOXICAM 7.5 MG/1
TABLET ORAL
Qty: 30 TABLET | Refills: 0 | Status: SHIPPED | OUTPATIENT
Start: 2018-07-10 | End: 2018-09-25 | Stop reason: ALTCHOICE

## 2018-07-10 RX ORDER — SENNOSIDES 8.6 MG
650 CAPSULE ORAL EVERY 8 HOURS PRN
COMMUNITY
End: 2019-06-26

## 2018-07-10 NOTE — PROGRESS NOTES
Assessment/Plan:  1  Peroneal tendonitis, right    2  Chronic pain of right knee    3  Chronic pain of right ankle    4  Post-traumatic osteoarthritis of right knee      Orders Placed This Encounter   Procedures    Medial  Knee Brace    Brace    Ambulatory referral to Physical Therapy     -will work on getting viscosupplementation for his right knee   -patient will go to physical therapy for his knee and ankle her  -he will continue with meloxicam and Tylenol as needed for pain control  -he will get a medial  knee brace and ankle brace  Return for after visco        Subjective   Chief Complaint: No chief complaint on file  Olivia David is a 51 y o  male who presents for follow up for right knee pain and ankle pain  He has had a right knee steroid injection at his last visit on 5/17/2018  He states that did help slightly but he still is having pain  He states he has good and bad days  He states he mainly has more bad days than good days  States the pain is in the posterior and lateral knee  Pain her does not radiate  His knee does feel stable to him  He states his knee feels tight and he cannot bend it fully  He has not gone to physical therapy due to not having time  He did not get any braces at his last visit due to not being able to afford them  He has been walking but states that this causes him pain  He was taking meloxicam which did help with the pain  He did take Tylenol with the meloxicam   He still has ankle pain that is in the lateral aspect of his ankle  He also states his Achilles feels like it throbs at times  He denies any numbness or tingling  He states he now does have the money for the braces and would like to get them today  He also would like to go to physical therapy  Review of Systems  ROS:    See HPI for musculoskeletal review  All other systems reviewed are negative     History:  History reviewed  No pertinent past medical history    Past Surgical History:   Procedure Laterality Date    KNEE ARTHROSCOPY Bilateral     SHOULDER SURGERY Right 2014     Social History   History   Alcohol Use    Yes     History   Drug Use No     History   Smoking Status    Former Smoker   Smokeless Tobacco    Current User     Family History:   Family History   Problem Relation Age of Onset    Alcohol abuse Mother         denies    Substance Abuse Mother         denies    Mental illness Mother         denies       Meds/Allergies     (Not in a hospital admission)  Allergies   Allergen Reactions    Chantix [Varenicline] Palpitations          Objective     /82   Pulse 76   Ht 6' (1 829 m)   Wt 116 kg (256 lb)   BMI 34 72 kg/m²      PE:  AAOx 3  WDWN  Hearing intact, no drainage from eyes  no audible wheezing  no abdominal distension  LE compartments soft, skin intact    Ortho Exam:  right Knee:   No erythema  no swelling  no effusion  no warmth  AROM: 0-110  Stable to varus/valgus stress    Right ankle:  TTP over peroneal tendon posterior to lateral malleolus  ROM normal  Strength normal  Negative Anterior drawer  Sensation normal  palpable pedal pulse   Negative montaño test  No TTP over Achilles tendon

## 2018-07-26 ENCOUNTER — APPOINTMENT (OUTPATIENT)
Dept: LAB | Facility: HOSPITAL | Age: 48
End: 2018-07-26

## 2018-07-26 ENCOUNTER — TRANSCRIBE ORDERS (OUTPATIENT)
Dept: ADMINISTRATIVE | Facility: HOSPITAL | Age: 48
End: 2018-07-26

## 2018-07-26 DIAGNOSIS — Z00.8 HEALTH EXAMINATION IN POPULATION SURVEY: Primary | ICD-10-CM

## 2018-07-26 DIAGNOSIS — Z00.8 HEALTH EXAMINATION IN POPULATION SURVEY: ICD-10-CM

## 2018-07-26 LAB
CHOLEST SERPL-MCNC: 216 MG/DL (ref 50–200)
EST. AVERAGE GLUCOSE BLD GHB EST-MCNC: 108 MG/DL
HBA1C MFR BLD: 5.4 % (ref 4.2–6.3)
HDLC SERPL-MCNC: 37 MG/DL (ref 40–60)
LDLC SERPL CALC-MCNC: 141 MG/DL (ref 0–100)
NONHDLC SERPL-MCNC: 179 MG/DL
TRIGL SERPL-MCNC: 192 MG/DL

## 2018-07-26 PROCEDURE — 36415 COLL VENOUS BLD VENIPUNCTURE: CPT

## 2018-07-26 PROCEDURE — 80061 LIPID PANEL: CPT

## 2018-07-26 PROCEDURE — 83036 HEMOGLOBIN GLYCOSYLATED A1C: CPT

## 2018-08-01 ENCOUNTER — TELEPHONE (OUTPATIENT)
Dept: OBGYN CLINIC | Facility: HOSPITAL | Age: 48
End: 2018-08-01

## 2018-08-01 NOTE — TELEPHONE ENCOUNTER
Left message for patient to call back and schedule 3 appts with Dr Tram Reyes for right knee injections Euflexxa (buy&bill)

## 2018-08-21 ENCOUNTER — TELEPHONE (OUTPATIENT)
Dept: OBGYN CLINIC | Facility: HOSPITAL | Age: 48
End: 2018-08-21

## 2018-08-21 NOTE — TELEPHONE ENCOUNTER
Spoke with patient to schedule 3 appts with Dr Roney Paredes for right knee Euflexxa injections  (buy&bill) patient stated he will call us back to schedule since he wants to see if he had a deductible   I jeffrey informed him that the exp is good until 10/26/18

## 2018-09-25 ENCOUNTER — OFFICE VISIT (OUTPATIENT)
Dept: FAMILY MEDICINE CLINIC | Facility: HOSPITAL | Age: 48
End: 2018-09-25
Payer: COMMERCIAL

## 2018-09-25 VITALS
HEART RATE: 82 BPM | SYSTOLIC BLOOD PRESSURE: 122 MMHG | HEIGHT: 72 IN | TEMPERATURE: 97 F | DIASTOLIC BLOOD PRESSURE: 78 MMHG | WEIGHT: 256.6 LBS | BODY MASS INDEX: 34.75 KG/M2

## 2018-09-25 DIAGNOSIS — J02.0 STREP PHARYNGITIS: Primary | ICD-10-CM

## 2018-09-25 LAB — S PYO AG THROAT QL: POSITIVE

## 2018-09-25 PROCEDURE — 87880 STREP A ASSAY W/OPTIC: CPT | Performed by: NURSE PRACTITIONER

## 2018-09-25 PROCEDURE — 99213 OFFICE O/P EST LOW 20 MIN: CPT | Performed by: NURSE PRACTITIONER

## 2018-09-25 RX ORDER — AMOXICILLIN 500 MG/1
1000 CAPSULE ORAL EVERY 12 HOURS SCHEDULED
Qty: 40 CAPSULE | Refills: 0 | Status: SHIPPED | OUTPATIENT
Start: 2018-09-25 | End: 2018-10-05

## 2018-09-25 NOTE — PROGRESS NOTES
Assessment/Plan:     Rapid strep positive  Will prescribe antibiotic  Change toothbrush 24 hours after starting antibiotic to decrease risk of reinfection  Call if no improvement or worsening  Diagnoses and all orders for this visit:    Strep pharyngitis  -     POCT rapid strepA  -     amoxicillin (AMOXIL) 500 mg capsule; Take 2 capsules (1,000 mg total) by mouth every 12 (twelve) hours for 10 days          Subjective:     Patient ID: Kevyn Reyes is a 51 y o  male  Started with itchy throat, ears and headache  Yesterday HA worsened  Started with cough  Left sided sore throat  Woke this morning feeling sweaty  Poor sleep due to cough  Still with sore throat  No fever just sweating  Denies sob and wheezing  Denies nasal congestion and runny nose  Denies abdominal pain, N/V/D  Took arthritis medication before bed last night otherwise no OTC cold or fever reducers  Review of Systems   Constitutional: Positive for diaphoresis  Negative for chills and fever  HENT: Positive for sore throat  Negative for congestion, ear pain and rhinorrhea  Respiratory: Positive for cough  Negative for shortness of breath and wheezing  Gastrointestinal: Negative for abdominal pain, diarrhea, nausea and vomiting  Neurological: Positive for headaches  The following portions of the patient's history were reviewed and updated as appropriate: allergies, current medications, past family history, past medical history, past social history, past surgical history and problem list     Objective:  Vitals:    09/25/18 0939   BP: 122/78   Pulse: 82   Temp: (!) 97 °F (36 1 °C)      Physical Exam   Constitutional: He is oriented to person, place, and time  He appears well-developed and well-nourished     HENT:   Right Ear: Tympanic membrane, external ear and ear canal normal    Left Ear: Tympanic membrane, external ear and ear canal normal    Mouth/Throat: Uvula is midline, oropharynx is clear and moist and mucous membranes are normal    Neck:   Mild right sided anterior cervical adenopathy  Cardiovascular: Normal rate, regular rhythm and normal heart sounds  No murmur heard  Pulmonary/Chest: Effort normal and breath sounds normal    Lymphadenopathy:     He has cervical adenopathy  Neurological: He is alert and oriented to person, place, and time  Skin: Skin is warm and dry  Psychiatric: He has a normal mood and affect  Vitals reviewed

## 2018-09-29 ENCOUNTER — OFFICE VISIT (OUTPATIENT)
Dept: FAMILY MEDICINE CLINIC | Facility: HOSPITAL | Age: 48
End: 2018-09-29
Payer: COMMERCIAL

## 2018-09-29 VITALS
HEART RATE: 82 BPM | DIASTOLIC BLOOD PRESSURE: 72 MMHG | TEMPERATURE: 97.8 F | SYSTOLIC BLOOD PRESSURE: 128 MMHG | WEIGHT: 257 LBS | HEIGHT: 72 IN | BODY MASS INDEX: 34.81 KG/M2

## 2018-09-29 DIAGNOSIS — B96.89 ACUTE BACTERIAL BRONCHITIS: Primary | ICD-10-CM

## 2018-09-29 DIAGNOSIS — J20.8 ACUTE BACTERIAL BRONCHITIS: Primary | ICD-10-CM

## 2018-09-29 PROCEDURE — 3008F BODY MASS INDEX DOCD: CPT | Performed by: FAMILY MEDICINE

## 2018-09-29 PROCEDURE — 99213 OFFICE O/P EST LOW 20 MIN: CPT | Performed by: FAMILY MEDICINE

## 2018-09-29 PROCEDURE — 1036F TOBACCO NON-USER: CPT | Performed by: FAMILY MEDICINE

## 2018-09-29 RX ORDER — BENZONATATE 100 MG/1
100 CAPSULE ORAL 3 TIMES DAILY PRN
Qty: 20 CAPSULE | Refills: 0 | Status: SHIPPED | OUTPATIENT
Start: 2018-09-29 | End: 2019-02-14

## 2018-09-29 RX ORDER — AZITHROMYCIN 250 MG/1
TABLET, FILM COATED ORAL
Qty: 6 TABLET | Refills: 0 | Status: SHIPPED | OUTPATIENT
Start: 2018-09-29 | End: 2018-10-03

## 2018-09-29 RX ORDER — ALBUTEROL SULFATE 90 UG/1
2 AEROSOL, METERED RESPIRATORY (INHALATION) EVERY 6 HOURS PRN
Qty: 8.5 G | Refills: 0 | Status: SHIPPED | OUTPATIENT
Start: 2018-09-29 | End: 2019-02-14

## 2018-09-29 NOTE — PROGRESS NOTES
[facility]  [id number] [facility]  [address], [address]  Tel: [phone]      Assessment/Plan:    Problem List Items Addressed This Visit     None      Visit Diagnoses     Acute bacterial bronchitis    -  Primary    Relevant Medications    azithromycin (ZITHROMAX) 250 mg tablet    albuterol (PROAIR HFA) 90 mcg/act inhaler    benzonatate (TESSALON PERLES) 100 mg capsule        Recently treated for acute strep and improvement of pharyngitis  However today he is consistent with acute bacterail bronchitis  Empirically treat with azithromycin  Add tessalon perles and albuterol to help with sytmpoms  To call in 2-3 days if not improving          _____________________________________________________________________    History of Present Illness:     Patient is here for an acute visit      Cold Like Symptoms (strep--tuesday-on ABX--Wheezing last night)    Patient here 4 days ago and treated for strep pharyngitis  Pharyngitis is improved  However started with a cough, productive  Feeling feverish and chills  Discolored sputum  Wheezing and shortness of breath  No rash  Was on amoxil   sytmpoms are getting worse              -----------------------------  Review of Systems   Constitutional: Positive for chills, diaphoresis and fever  Negative for activity change and appetite change  Low grade fever of 99F max   HENT: Negative for congestion and dental problem  Respiratory: Positive for cough, shortness of breath and wheezing  Negative for apnea and chest tightness  Cardiovascular: Negative  Negative for chest pain, palpitations and leg swelling  Gastrointestinal: Negative  Negative for abdominal distention, abdominal pain, constipation, diarrhea and nausea  Genitourinary: Negative  Negative for difficulty urinating, dysuria and frequency         Social Hx reviewed:    Social History     Social History    Marital status: /Civil Union     Spouse name: N/A    Number of children: N/A    Years of education: N/A     Occupational History    Not on file  Social History Main Topics    Smoking status: Former Smoker    Smokeless tobacco: Current User    Alcohol use Yes    Drug use: No    Sexual activity: Not on file     Other Topics Concern    Not on file     Social History Narrative    No narrative on file       No past medical history on file  Current Facility-Administered Medications:  betamethasone acetate-betamethasone sodium phosphate 6 mg Intra-articular Once Nacho Healy MD   lidocaine (PF) 7 mL Infiltration Once Nacho Healy MD       Allergies   Allergen Reactions    Chantix [Varenicline] Palpitations         Vitals:    09/29/18 1010   BP: 128/72   Pulse: 82   Temp: 97 8 °F (36 6 °C)     Physical Exam   Constitutional: He is oriented to person, place, and time  He appears well-developed and well-nourished  HENT:   Head: Normocephalic and atraumatic  Eyes: Pupils are equal, round, and reactive to light  EOM are normal    Neck: Normal range of motion  Neck supple  Cardiovascular: Normal rate, regular rhythm and normal heart sounds  Pulmonary/Chest: Effort normal  He has no decreased breath sounds  He has wheezes  He has rhonchi in the right middle field and the left middle field  He has no rales  Abdominal: Soft  Bowel sounds are normal    Neurological: He is alert and oriented to person, place, and time  Skin: Skin is warm and dry  Psychiatric: He has a normal mood and affect  His behavior is normal    Nursing note and vitals reviewed  To call our office if any concerns/questions at [phone]

## 2019-02-10 ENCOUNTER — OFFICE VISIT (OUTPATIENT)
Dept: URGENT CARE | Facility: CLINIC | Age: 49
End: 2019-02-10
Payer: COMMERCIAL

## 2019-02-10 VITALS
WEIGHT: 265 LBS | DIASTOLIC BLOOD PRESSURE: 76 MMHG | HEIGHT: 72 IN | RESPIRATION RATE: 16 BRPM | BODY MASS INDEX: 35.89 KG/M2 | HEART RATE: 72 BPM | OXYGEN SATURATION: 97 % | SYSTOLIC BLOOD PRESSURE: 122 MMHG | TEMPERATURE: 97 F

## 2019-02-10 DIAGNOSIS — J01.00 ACUTE NON-RECURRENT MAXILLARY SINUSITIS: Primary | ICD-10-CM

## 2019-02-10 PROCEDURE — G0382 LEV 3 HOSP TYPE B ED VISIT: HCPCS | Performed by: NURSE PRACTITIONER

## 2019-02-10 RX ORDER — AMOXICILLIN AND CLAVULANATE POTASSIUM 875; 125 MG/1; MG/1
1 TABLET, FILM COATED ORAL EVERY 12 HOURS SCHEDULED
Qty: 20 TABLET | Refills: 0 | Status: SHIPPED | OUTPATIENT
Start: 2019-02-10 | End: 2019-02-20

## 2019-02-10 NOTE — PROGRESS NOTES
NAME: Mallorie Wise is a 47 y o  male  : 1970    MRN: [id number]      Assessment and Plan   Acute non-recurrent maxillary sinusitis [J01 00]  1  Acute non-recurrent maxillary sinusitis  amoxicillin-clavulanate (AUGMENTIN) 875-125 mg per tablet       Sarah Ojeda was seen today for sinusitis  Diagnoses and all orders for this visit:    Acute non-recurrent maxillary sinusitis  -     amoxicillin-clavulanate (AUGMENTIN) 875-125 mg per tablet; Take 1 tablet by mouth every 12 (twelve) hours for 10 days    ekg done   Xray done    Patient Instructions   Patient Instructions   Follow up with pcp   Take meds as directed  Use antibiotic as directed  Use flonase and zyrtec daily   Warm salt gargles  Rest increased fluids  Proceed to ER if symptoms worsen  Chief Complaint     Chief Complaint   Patient presents with    Sinusitis     this morning, right side         History of Present Illness     Pt here today for sinus complaints, right ear pain, decerased hearing , pressure over the eyes and scratchy throat  Symptoms present for the past three days but symptoms worse in the past one day  He has used some OTC meds with little relief but still is symptomatic  Sinusitis   Associated symptoms include chills, congestion (nasal), coughing and sinus pressure  Pertinent negatives include no sneezing or sore throat  Review of Systems   Review of Systems   Constitutional: Positive for chills, fatigue and fever  HENT: Positive for congestion (nasal), facial swelling (right side), postnasal drip, rhinorrhea, sinus pressure and sinus pain  Negative for ear discharge, sneezing and sore throat  Eyes: Negative  Respiratory: Positive for cough            Current Medications       Current Outpatient Medications:     acetaminophen (TYLENOL 8 HOUR ARTHRITIS PAIN) 650 mg CR tablet, Take 650 mg by mouth every 8 (eight) hours as needed for mild pain, Disp: , Rfl:     nicotine (NICODERM CQ) 21 mg/24 hr TD 24 hr patch, Place 1 patch on the skin daily, Disp: 28 patch, Rfl: 0    albuterol (PROAIR HFA) 90 mcg/act inhaler, Inhale 2 puffs every 6 (six) hours as needed for wheezing (Patient not taking: Reported on 2/10/2019), Disp: 8 5 g, Rfl: 0    amoxicillin-clavulanate (AUGMENTIN) 875-125 mg per tablet, Take 1 tablet by mouth every 12 (twelve) hours for 10 days, Disp: 20 tablet, Rfl: 0    benzonatate (TESSALON PERLES) 100 mg capsule, Take 1 capsule (100 mg total) by mouth 3 (three) times a day as needed for cough (Patient not taking: Reported on 2/10/2019), Disp: 20 capsule, Rfl: 0    loratadine (CLARITIN) 10 mg tablet, Take by mouth, Disp: , Rfl:     Current Facility-Administered Medications:     betamethasone acetate-betamethasone sodium phosphate (CELESTONE) injection 6 mg, 6 mg, Intra-articular, Once, Braulio Childress MD    lidocaine (PF) (XYLOCAINE-MPF) 1 % injection 7 mL, 7 mL, Infiltration, Once, Braulio Childress MD    Current Allergies     Allergies as of 02/10/2019 - Reviewed 02/10/2019   Allergen Reaction Noted    Chantix [varenicline] Palpitations 10/14/2017              History reviewed  No pertinent past medical history  Past Surgical History:   Procedure Laterality Date    KNEE ARTHROSCOPY Bilateral     SHOULDER SURGERY Right 2014       Family History   Problem Relation Age of Onset    Alcohol abuse Mother         denies    Substance Abuse Mother         denies    Mental illness Mother         denies         Medications have been verified  The following portions of the patient's history were reviewed and updated as appropriate: allergies, current medications, past family history, past medical history, past social history, past surgical history and problem list     Objective   /76   Pulse 72   Temp (!) 97 °F (36 1 °C)   Resp 16   Ht 6' (1 829 m)   Wt 120 kg (265 lb)   SpO2 97%   BMI 35 94 kg/m²      Physical Exam     Physical Exam   Constitutional: He is oriented to person, place, and time   He appears well-developed and well-nourished  He is cooperative  HENT:   Head: Normocephalic  Right Ear: External ear normal  No tenderness  Left Ear: External ear normal  No tenderness  Nose: Mucosal edema present  No sinus tenderness  Right sinus exhibits maxillary sinus tenderness  Left sinus exhibits maxillary sinus tenderness  Mouth/Throat: Uvula is midline and mucous membranes are normal  Posterior oropharyngeal edema and posterior oropharyngeal erythema present  Turbinates in the right nare are red and swollen with thick mucus present, TTP over the max sinus on the right side, swelling noted pain under the right eye  Neck: Trachea normal and full passive range of motion without pain  No thyroid mass and no thyromegaly present  Cardiovascular: Normal rate and regular rhythm  Pulmonary/Chest: Effort normal and breath sounds normal  No stridor  He has no decreased breath sounds  He has no wheezes  He has no rhonchi  He has no rales  Neurological: He is alert and oriented to person, place, and time         SYLVIA Moore

## 2019-02-10 NOTE — PATIENT INSTRUCTIONS
Follow up with pcp   Take meds as directed  Use antibiotic as directed  Use flonase and zyrtec daily   Warm salt gargles  Rest increased fluids

## 2019-02-14 ENCOUNTER — HOSPITAL ENCOUNTER (OUTPATIENT)
Dept: RADIOLOGY | Facility: HOSPITAL | Age: 49
Discharge: HOME | End: 2019-02-14
Payer: COMMERCIAL

## 2019-02-14 ENCOUNTER — OFFICE VISIT (OUTPATIENT)
Dept: FAMILY MEDICINE CLINIC | Facility: HOSPITAL | Age: 49
End: 2019-02-14
Payer: COMMERCIAL

## 2019-02-14 VITALS
SYSTOLIC BLOOD PRESSURE: 118 MMHG | OXYGEN SATURATION: 97 % | WEIGHT: 265 LBS | BODY MASS INDEX: 35.89 KG/M2 | HEIGHT: 72 IN | HEART RATE: 64 BPM | TEMPERATURE: 95.9 F | DIASTOLIC BLOOD PRESSURE: 80 MMHG

## 2019-02-14 DIAGNOSIS — R60.9 EDEMA, PERIPHERAL: ICD-10-CM

## 2019-02-14 DIAGNOSIS — J20.9 ACUTE BRONCHITIS, UNSPECIFIED ORGANISM: Primary | ICD-10-CM

## 2019-02-14 DIAGNOSIS — J01.00 ACUTE NON-RECURRENT MAXILLARY SINUSITIS: ICD-10-CM

## 2019-02-14 DIAGNOSIS — H65.02 ACUTE SEROUS OTITIS MEDIA OF LEFT EAR, RECURRENCE NOT SPECIFIED: ICD-10-CM

## 2019-02-14 DIAGNOSIS — J20.9 ACUTE BRONCHITIS, UNSPECIFIED ORGANISM: ICD-10-CM

## 2019-02-14 PROCEDURE — 1036F TOBACCO NON-USER: CPT | Performed by: FAMILY MEDICINE

## 2019-02-14 PROCEDURE — 71046 X-RAY EXAM CHEST 2 VIEWS: CPT

## 2019-02-14 PROCEDURE — 3008F BODY MASS INDEX DOCD: CPT | Performed by: FAMILY MEDICINE

## 2019-02-14 PROCEDURE — 99213 OFFICE O/P EST LOW 20 MIN: CPT | Performed by: FAMILY MEDICINE

## 2019-02-14 NOTE — PROGRESS NOTES
[address] [facility], [facility]  Tel: [phone]      Assessment/Plan:    Problem List Items Addressed This Visit     None      Visit Diagnoses     Acute bronchitis, unspecified organism    -  Primary    Relevant Orders    XR chest pa & lateral    Acute non-recurrent maxillary sinusitis            Was seen at urgent care  Sinusitis improving however with ongoing acute bronchitis  Currently on augmentin  Will check CXR to rule out pneumonia  Continue with supportive treatment          _____________________________________________________________________    History of Present Illness:     Patient is here for an acute visit      Cough; Shortness of Breath; Nasal Congestion; and Back Pain    Several days ago he was seen at urgent care for acute sinusitis  Was given augmentin  The sinus pressure and pain has improved  However now with chest congestion, productive coughing  No significant sob , no wheezing  No dyspnea on exertion  No chest pain  No fever, no chills  With upper back pain and myalgia  No flu contacts but has been travelling  Also with leg edema  More so the right than the left  No leg pain  No erythema  Does have compression stockings but has not been using them  Does not watch his salt intake  No hx of chf or hypothyroidism  -----------------------------  Review of Systems   Constitutional: Negative  Negative for activity change, appetite change, chills and diaphoresis  HENT: Negative for congestion and dental problem  Respiratory: Negative  Negative for apnea, chest tightness, shortness of breath and wheezing  Cardiovascular: Negative  Negative for chest pain, palpitations and leg swelling  Gastrointestinal: Negative  Negative for abdominal distention, abdominal pain, constipation, diarrhea and nausea  Genitourinary: Negative  Negative for difficulty urinating, dysuria and frequency         Social Hx reviewed:    Social History     Socioeconomic History    Marital status: /Civil Union     Spouse name: Not on file    Number of children: Not on file    Years of education: Not on file    Highest education level: Not on file   Occupational History    Not on file   Social Needs    Financial resource strain: Not on file    Food insecurity:     Worry: Not on file     Inability: Not on file    Transportation needs:     Medical: Not on file     Non-medical: Not on file   Tobacco Use    Smoking status: Former Smoker     Types: Cigarettes    Smokeless tobacco: Current User     Types: Snuff   Substance and Sexual Activity    Alcohol use: Yes    Drug use: No    Sexual activity: Not on file   Lifestyle    Physical activity:     Days per week: Not on file     Minutes per session: Not on file    Stress: Not on file   Relationships    Social connections:     Talks on phone: Not on file     Gets together: Not on file     Attends Spiritism service: Not on file     Active member of club or organization: Not on file     Attends meetings of clubs or organizations: Not on file     Relationship status: Not on file    Intimate partner violence:     Fear of current or ex partner: Not on file     Emotionally abused: Not on file     Physically abused: Not on file     Forced sexual activity: Not on file   Other Topics Concern    Not on file   Social History Narrative    Not on file       History reviewed  No pertinent past medical history  Current Facility-Administered Medications:  betamethasone acetate-betamethasone sodium phosphate 6 mg Intra-articular Once Leticia Major MD   lidocaine (PF) 7 mL Infiltration Once Leticia Major MD       Allergies   Allergen Reactions    Chantix [Varenicline] Palpitations         Vitals:    02/14/19 1111   BP: 118/80   Pulse: 64   Temp: (!) 95 9 °F (35 5 °C)   SpO2: 97%     Physical Exam   Constitutional: He is oriented to person, place, and time   He appears well-developed and well-nourished  HENT:   Head: Normocephalic and atraumatic  Eyes: Pupils are equal, round, and reactive to light  EOM are normal    Neck: Normal range of motion  Neck supple  Cardiovascular: Normal rate, regular rhythm and normal heart sounds  Pulmonary/Chest: Effort normal  He has decreased breath sounds  He has rhonchi in the right lower field  Abdominal: Soft  Bowel sounds are normal    Musculoskeletal:        Right lower leg: He exhibits edema  Neurological: He is alert and oriented to person, place, and time  Skin: Skin is warm and dry  Psychiatric: He has a normal mood and affect  His behavior is normal    Nursing note and vitals reviewed  To call our office if any concerns/questions at [phone]

## 2019-04-03 ENCOUNTER — OFFICE VISIT (OUTPATIENT)
Dept: FAMILY MEDICINE CLINIC | Facility: HOSPITAL | Age: 49
End: 2019-04-03
Payer: COMMERCIAL

## 2019-04-03 VITALS
BODY MASS INDEX: 35.32 KG/M2 | DIASTOLIC BLOOD PRESSURE: 78 MMHG | TEMPERATURE: 98.9 F | HEART RATE: 86 BPM | SYSTOLIC BLOOD PRESSURE: 126 MMHG | WEIGHT: 262.2 LBS | OXYGEN SATURATION: 97 %

## 2019-04-03 DIAGNOSIS — J06.9 UPPER RESPIRATORY TRACT INFECTION, UNSPECIFIED TYPE: Primary | ICD-10-CM

## 2019-04-03 PROCEDURE — 1036F TOBACCO NON-USER: CPT | Performed by: NURSE PRACTITIONER

## 2019-04-03 PROCEDURE — 99213 OFFICE O/P EST LOW 20 MIN: CPT | Performed by: NURSE PRACTITIONER

## 2019-04-23 DIAGNOSIS — Z12.11 SCREENING FOR COLON CANCER: Primary | ICD-10-CM

## 2019-06-03 ENCOUNTER — OFFICE VISIT (OUTPATIENT)
Dept: FAMILY MEDICINE CLINIC | Facility: HOSPITAL | Age: 49
End: 2019-06-03
Payer: COMMERCIAL

## 2019-06-03 VITALS
SYSTOLIC BLOOD PRESSURE: 128 MMHG | TEMPERATURE: 97.8 F | DIASTOLIC BLOOD PRESSURE: 82 MMHG | WEIGHT: 262 LBS | HEIGHT: 72 IN | HEART RATE: 75 BPM | BODY MASS INDEX: 35.49 KG/M2

## 2019-06-03 DIAGNOSIS — Z12.12 SCREENING FOR COLORECTAL CANCER: ICD-10-CM

## 2019-06-03 DIAGNOSIS — E78.5 DYSLIPIDEMIA: ICD-10-CM

## 2019-06-03 DIAGNOSIS — Z00.00 ANNUAL PHYSICAL EXAM: Primary | ICD-10-CM

## 2019-06-03 DIAGNOSIS — Z12.11 SCREENING FOR COLORECTAL CANCER: ICD-10-CM

## 2019-06-03 DIAGNOSIS — Z12.5 SCREENING FOR PROSTATE CANCER: ICD-10-CM

## 2019-06-03 DIAGNOSIS — Z13.29 SCREENING FOR THYROID DISORDER: ICD-10-CM

## 2019-06-03 DIAGNOSIS — M17.31 POST-TRAUMATIC OSTEOARTHRITIS OF RIGHT KNEE: ICD-10-CM

## 2019-06-03 DIAGNOSIS — Z12.11 SCREENING FOR COLON CANCER: ICD-10-CM

## 2019-06-03 DIAGNOSIS — E66.9 OBESITY (BMI 35.0-39.9 WITHOUT COMORBIDITY): ICD-10-CM

## 2019-06-03 DIAGNOSIS — E66.09 CLASS 2 OBESITY DUE TO EXCESS CALORIES WITHOUT SERIOUS COMORBIDITY WITH BODY MASS INDEX (BMI) OF 35.0 TO 35.9 IN ADULT: ICD-10-CM

## 2019-06-03 PROCEDURE — 99396 PREV VISIT EST AGE 40-64: CPT | Performed by: INTERNAL MEDICINE

## 2019-06-23 ENCOUNTER — OFFICE VISIT (OUTPATIENT)
Dept: URGENT CARE | Facility: CLINIC | Age: 49
End: 2019-06-23
Payer: COMMERCIAL

## 2019-06-23 VITALS
HEIGHT: 72 IN | TEMPERATURE: 97.7 F | HEART RATE: 80 BPM | WEIGHT: 259 LBS | RESPIRATION RATE: 16 BRPM | OXYGEN SATURATION: 96 % | DIASTOLIC BLOOD PRESSURE: 74 MMHG | SYSTOLIC BLOOD PRESSURE: 110 MMHG | BODY MASS INDEX: 35.08 KG/M2

## 2019-06-23 DIAGNOSIS — J20.8 VIRAL BRONCHITIS: Primary | ICD-10-CM

## 2019-06-23 PROCEDURE — G0382 LEV 3 HOSP TYPE B ED VISIT: HCPCS | Performed by: PHYSICIAN ASSISTANT

## 2019-06-23 RX ORDER — CETIRIZINE HYDROCHLORIDE 10 MG/1
10 TABLET ORAL DAILY
COMMUNITY
End: 2019-06-26

## 2019-06-23 RX ORDER — PREDNISONE 10 MG/1
TABLET ORAL
Qty: 15 TABLET | Refills: 0 | Status: SHIPPED | OUTPATIENT
Start: 2019-06-23 | End: 2019-07-06 | Stop reason: ALTCHOICE

## 2019-06-26 ENCOUNTER — OFFICE VISIT (OUTPATIENT)
Dept: FAMILY MEDICINE CLINIC | Facility: HOSPITAL | Age: 49
End: 2019-06-26
Payer: COMMERCIAL

## 2019-06-26 VITALS
SYSTOLIC BLOOD PRESSURE: 118 MMHG | WEIGHT: 261.8 LBS | DIASTOLIC BLOOD PRESSURE: 78 MMHG | TEMPERATURE: 96.9 F | OXYGEN SATURATION: 98 % | BODY MASS INDEX: 35.46 KG/M2 | HEART RATE: 70 BPM | HEIGHT: 72 IN

## 2019-06-26 DIAGNOSIS — J20.9 ACUTE BRONCHITIS, UNSPECIFIED ORGANISM: Primary | ICD-10-CM

## 2019-06-26 PROCEDURE — 99213 OFFICE O/P EST LOW 20 MIN: CPT | Performed by: FAMILY MEDICINE

## 2019-06-26 RX ORDER — BENZONATATE 100 MG/1
100 CAPSULE ORAL 3 TIMES DAILY PRN
Qty: 20 CAPSULE | Refills: 0 | Status: SHIPPED | OUTPATIENT
Start: 2019-06-26 | End: 2019-07-06 | Stop reason: ALTCHOICE

## 2019-06-26 RX ORDER — LORATADINE AND PSEUDOEPHEDRINE 10; 240 MG/1; MG/1
1 TABLET, EXTENDED RELEASE ORAL DAILY
COMMUNITY
End: 2021-07-21

## 2019-06-26 RX ORDER — AZITHROMYCIN 250 MG/1
TABLET, FILM COATED ORAL
Qty: 6 TABLET | Refills: 0 | Status: SHIPPED | OUTPATIENT
Start: 2019-06-26 | End: 2019-07-01

## 2019-07-06 ENCOUNTER — OFFICE VISIT (OUTPATIENT)
Dept: FAMILY MEDICINE CLINIC | Facility: HOSPITAL | Age: 49
End: 2019-07-06
Payer: COMMERCIAL

## 2019-07-06 VITALS
TEMPERATURE: 97.3 F | BODY MASS INDEX: 34.67 KG/M2 | SYSTOLIC BLOOD PRESSURE: 122 MMHG | DIASTOLIC BLOOD PRESSURE: 80 MMHG | HEART RATE: 84 BPM | WEIGHT: 256 LBS | HEIGHT: 72 IN

## 2019-07-06 DIAGNOSIS — R05.9 COUGH: Primary | ICD-10-CM

## 2019-07-06 DIAGNOSIS — J30.2 SEASONAL ALLERGIC RHINITIS, UNSPECIFIED TRIGGER: ICD-10-CM

## 2019-07-06 PROCEDURE — 99214 OFFICE O/P EST MOD 30 MIN: CPT | Performed by: NURSE PRACTITIONER

## 2019-07-06 PROCEDURE — 1036F TOBACCO NON-USER: CPT | Performed by: NURSE PRACTITIONER

## 2019-07-06 PROCEDURE — 3008F BODY MASS INDEX DOCD: CPT | Performed by: NURSE PRACTITIONER

## 2019-07-06 RX ORDER — BUDESONIDE AND FORMOTEROL FUMARATE DIHYDRATE 80; 4.5 UG/1; UG/1
2 AEROSOL RESPIRATORY (INHALATION) 2 TIMES DAILY
Qty: 1 INHALER | Refills: 1 | Status: SHIPPED | OUTPATIENT
Start: 2019-07-06 | End: 2020-08-06

## 2019-07-06 RX ORDER — MONTELUKAST SODIUM 10 MG/1
10 TABLET ORAL
Qty: 30 TABLET | Refills: 1 | Status: SHIPPED | OUTPATIENT
Start: 2019-07-06 | End: 2020-08-06

## 2019-07-06 RX ORDER — FLUTICASONE PROPIONATE 50 MCG
SPRAY, SUSPENSION (ML) NASAL
Qty: 1 BOTTLE | Refills: 1 | Status: SHIPPED | OUTPATIENT
Start: 2019-07-06 | End: 2021-04-27 | Stop reason: SDUPTHER

## 2019-07-06 RX ORDER — PROMETHAZINE HYDROCHLORIDE AND CODEINE PHOSPHATE 6.25; 1 MG/5ML; MG/5ML
SYRUP ORAL
Qty: 240 ML | Refills: 0 | Status: SHIPPED | OUTPATIENT
Start: 2019-07-06 | End: 2020-08-06

## 2019-07-06 NOTE — PROGRESS NOTES
Assessment/Plan:    No problem-specific Assessment & Plan notes found for this encounter  Diagnoses and all orders for this visit:    Cough  Comments:  subacute, probably allergic RAD given no response to antibiotic and oral steroid; start singulair, inhaler, & nasal steroid as rx'd, use mucinex in addition  Orders:  -     montelukast (SINGULAIR) 10 mg tablet; Take 1 tablet (10 mg total) by mouth daily at bedtime  -     budesonide-formoterol (SYMBICORT) 80-4 5 MCG/ACT inhaler; Inhale 2 puffs 2 (two) times a day Rinse mouth after use  -     fluticasone (FLONASE) 50 mcg/act nasal spray; Use 2 sprays in each nostril at bedtime  -     promethazine-codeine (PHENERGAN WITH CODEINE) 6 25-10 mg/5 mL syrup; Take 1-2 tsps every 6 hours as needed for cough    Seasonal allergic rhinitis, unspecified trigger  Comments:  continue daily antihistamine and start meds as rx'd  Orders:  -     montelukast (SINGULAIR) 10 mg tablet; Take 1 tablet (10 mg total) by mouth daily at bedtime  -     budesonide-formoterol (SYMBICORT) 80-4 5 MCG/ACT inhaler; Inhale 2 puffs 2 (two) times a day Rinse mouth after use  -     fluticasone (FLONASE) 50 mcg/act nasal spray; Use 2 sprays in each nostril at bedtime      If no improvement x 1-2 weeks or if worse, advise he f/u with pulmonary or allergist - asked that he call for order if needed  Subjective:      Patient ID: Jarad Mcneill is a 47 y o  male here for an acute visit  Has ongoing sporadic cough, worse as day goes on and has a headache by end of day from coughing so much  Was seen in urgent care on 6/23 and given prednisone w/o benefit  Was seen here by Dr Gerardo Zaragoza a couple days later and given antibiotic and perles without relief  Miserable with seasonal allergies - itchy nose, constant PND, sore throat, wheezes  Taking claritin D for almost 2 weeks without relief  Wife says he has "same lung thing" every few months  Had been in hospital a couple years ago w/pneumonia   Last CXR in February was normal    Has chewed tobacco since the age of 14  No cigarettes or vaping  FH: dad w/lung cancer         The following portions of the patient's history were reviewed and updated as appropriate: allergies, current medications, past family history, past medical history, past social history, past surgical history and problem list     Review of Systems   Constitutional: Positive for fatigue (he attributes to coughing)  Negative for activity change, appetite change, chills, fever and unexpected weight change  HENT: Positive for congestion, ear pain (clogged and itchy), postnasal drip and sore throat  Negative for trouble swallowing  Respiratory: Positive for cough (non-productive), shortness of breath ("only after a big hack session") and wheezing (occ at night)  Negative for choking  Cardiovascular: Negative for chest pain, palpitations and leg swelling  Neurological: Positive for light-headedness (after bad cough)  Objective:      /80   Pulse 84   Temp (!) 97 3 °F (36 3 °C)   Ht 6' (1 829 m)   Wt 116 kg (256 lb)   BMI 34 72 kg/m²          Physical Exam   Constitutional: He is oriented to person, place, and time  He appears well-developed and well-nourished  No distress  HENT:   Head: Normocephalic and atraumatic  Right Ear: Tympanic membrane is not erythematous  A middle ear effusion is present  Left Ear: Tympanic membrane is not erythematous  A middle ear effusion is present  Nose: Right sinus exhibits no maxillary sinus tenderness and no frontal sinus tenderness  Left sinus exhibits no maxillary sinus tenderness and no frontal sinus tenderness  Mouth/Throat: Oropharynx is clear and moist  No oropharyngeal exudate  Eyes: Conjunctivae are normal  Right eye exhibits no discharge  Left eye exhibits no discharge  Cardiovascular: Normal rate and regular rhythm  Pulmonary/Chest: Effort normal and breath sounds normal  No respiratory distress     1 brief episode deep moist cough   Lymphadenopathy:     He has no cervical adenopathy  Neurological: He is alert and oriented to person, place, and time  Skin: Skin is warm and dry  Psychiatric: He has a normal mood and affect  His behavior is normal  Judgment and thought content normal    Vitals reviewed

## 2019-11-11 ENCOUNTER — IMMUNIZATIONS (OUTPATIENT)
Dept: FAMILY MEDICINE CLINIC | Facility: HOSPITAL | Age: 49
End: 2019-11-11
Payer: COMMERCIAL

## 2019-11-11 DIAGNOSIS — Z23 ENCOUNTER FOR IMMUNIZATION: ICD-10-CM

## 2019-11-11 PROCEDURE — 90682 RIV4 VACC RECOMBINANT DNA IM: CPT | Performed by: FAMILY MEDICINE

## 2019-11-11 PROCEDURE — 90471 IMMUNIZATION ADMIN: CPT | Performed by: FAMILY MEDICINE

## 2019-11-30 ENCOUNTER — OFFICE VISIT (OUTPATIENT)
Dept: URGENT CARE | Facility: CLINIC | Age: 49
End: 2019-11-30
Payer: COMMERCIAL

## 2019-11-30 VITALS
BODY MASS INDEX: 34.95 KG/M2 | RESPIRATION RATE: 16 BRPM | WEIGHT: 258 LBS | HEIGHT: 72 IN | SYSTOLIC BLOOD PRESSURE: 112 MMHG | OXYGEN SATURATION: 97 % | DIASTOLIC BLOOD PRESSURE: 80 MMHG | HEART RATE: 79 BPM | TEMPERATURE: 98.3 F

## 2019-11-30 DIAGNOSIS — K04.7 DENTAL INFECTION: Primary | ICD-10-CM

## 2019-11-30 DIAGNOSIS — K08.89 PAIN, DENTAL: ICD-10-CM

## 2019-11-30 PROCEDURE — G0382 LEV 3 HOSP TYPE B ED VISIT: HCPCS | Performed by: PHYSICIAN ASSISTANT

## 2019-11-30 RX ORDER — AMOXICILLIN 500 MG/1
500 TABLET, FILM COATED ORAL 2 TIMES DAILY
Qty: 20 TABLET | Refills: 0
Start: 2019-11-30 | End: 2019-12-10

## 2019-11-30 RX ORDER — IBUPROFEN 800 MG/1
800 TABLET ORAL EVERY 6 HOURS PRN
Qty: 15 TABLET | Refills: 0 | Status: SHIPPED | OUTPATIENT
Start: 2019-11-30 | End: 2021-07-21

## 2019-11-30 RX ORDER — KETOROLAC TROMETHAMINE 30 MG/ML
30 INJECTION, SOLUTION INTRAMUSCULAR; INTRAVENOUS ONCE
Status: COMPLETED | OUTPATIENT
Start: 2019-11-30 | End: 2019-11-30

## 2019-11-30 RX ADMIN — KETOROLAC TROMETHAMINE 30 MG: 30 INJECTION, SOLUTION INTRAMUSCULAR; INTRAVENOUS at 18:45

## 2019-11-30 NOTE — PROGRESS NOTES
NAME: Esteban Lindsey is a 47 y o  male  : 1970    MRN: [id number]      Assessment and Plan   Dental infection [K04 7]  1  Dental infection  amoxicillin (AMOXIL) 500 MG tablet    ibuprofen (MOTRIN) 800 mg tablet   2  Pain, dental  ketorolac (TORADOL) injection 30 mg     Administrations This Visit     ketorolac (TORADOL) injection 30 mg     Admin Date  2019 Action  Given Dose  30 mg Route  Intramuscular Administered By  Alejandra Rachel RN              Patient purchased amoxicillin from [facility] on site  Toradol given in clinic and patient observed for 15 minutes  States he spoke to is dentist, he finally called him back and dentist is in agreement with our treatment plan here  He told him if anything changes or worsens he could see him in the office for an emergency visit  Told patient that is an option or he can go to the ER  Patient acknowledges    Patient Instructions   Patient Instructions   Ice and heat to the area  Ibuprofen as directed with food  Alternate with Tylenol  Amoxicillin as directed-take 1 tablet twice a day for the next 10 days  Follow-up with dentist as planned  If anything changes or worsens before then go to the ER    Proceed to ER if symptoms worsen  Chief Complaint     Chief Complaint   Patient presents with    Dental Pain     LUQ tooth pain started thursday morning  Extreme pain  Sensitive to cold/hot  Called dentist with no response  History of Present Illness   Patient with no reported past medical history presents complaining of left upper tooth pain times 2 days  States Thursday morning he started to notice that he was having sensitivity to hot and cold and reports that he has been having worsening pain since then  He reports he has had a problem with this tooth in the past and had a root canal earlier this year to it  He reports he has been fine since but this feels similar to when he needed a root canal   Denies any trauma to the tooth  Denies any fever, chills or any URI symptoms such as cough, congestion, sinus pain or pressure  He reports the pain is starting to radiate towards his ear, especially when he touches his teeth together  He reports he tried taking 2 Motrin earlier without any improvement  States he called his emergency line for his dentist but has not heard back yet  Review of Systems   Review of Systems   Constitutional: Negative for chills and fever  HENT: Positive for dental problem  Negative for ear discharge, ear pain, mouth sores, sore throat and trouble swallowing  Respiratory: Negative for cough  Current Medications       Current Outpatient Medications:     amoxicillin (AMOXIL) 500 MG tablet, Take 1 tablet (500 mg total) by mouth 2 (two) times a day for 10 days, Disp: 20 tablet, Rfl: 0    budesonide-formoterol (SYMBICORT) 80-4 5 MCG/ACT inhaler, Inhale 2 puffs 2 (two) times a day Rinse mouth after use , Disp: 1 Inhaler, Rfl: 1    diclofenac sodium (VOLTAREN) 1 %, Apply 2 g topically 2 (two) times a day as needed (pain), Disp: 100 g, Rfl: 3    fluticasone (FLONASE) 50 mcg/act nasal spray, Use 2 sprays in each nostril at bedtime, Disp: 1 Bottle, Rfl: 1    ibuprofen (MOTRIN) 800 mg tablet, Take 1 tablet (800 mg total) by mouth every 6 (six) hours as needed for moderate pain, Disp: 15 tablet, Rfl: 0    loratadine-pseudoephedrine (CLARITIN-D 24-HOUR)  mg per 24 hr tablet, Take 1 tablet by mouth daily, Disp: , Rfl:     montelukast (SINGULAIR) 10 mg tablet, Take 1 tablet (10 mg total) by mouth daily at bedtime, Disp: 30 tablet, Rfl: 1    promethazine-codeine (PHENERGAN WITH CODEINE) 6 25-10 mg/5 mL syrup, Take 1-2 tsps every 6 hours as needed for cough (Patient not taking: Reported on 11/30/2019), Disp: 240 mL, Rfl: 0  No current facility-administered medications for this visit       Current Allergies     Allergies as of 11/30/2019 - Reviewed 11/30/2019   Allergen Reaction Noted    Chantix [varenicline] Palpitations 10/14/2017              No past medical history on file  Past Surgical History:   Procedure Laterality Date    KNEE ARTHROSCOPY Bilateral     SHOULDER SURGERY Right 2014       Family History   Problem Relation Age of Onset    Alcohol abuse Mother         denies    Substance Abuse Mother         denies    Mental illness Mother         denies         Medications have been verified  The following portions of the patient's history were reviewed and updated as appropriate: allergies, current medications, past family history, past medical history, past social history, past surgical history and problem list     Objective   /80   Pulse 79   Temp 98 3 °F (36 8 °C)   Resp 16   Ht 6' (1 829 m)   Wt 117 kg (258 lb)   SpO2 97%   BMI 34 99 kg/m²      Physical Exam     Physical Exam   Constitutional: He appears well-developed and well-nourished  No distress  HENT:   Mouth/Throat:       Surgical tooth with decay and discoloration  Gum line appears to be receding  Gum line is erythematous and edematous  No visible or palpable abscess  Exquisitely tender to palpation  Not tender over the maxilla or the TMJ  Full open and close his jaw without difficulty  No oral lesions  No overlying facial swelling, erythema or warmth  Lymphadenopathy:     He has no cervical adenopathy  Skin: He is not diaphoretic  Nursing note and vitals reviewed

## 2019-11-30 NOTE — PATIENT INSTRUCTIONS
Ice and heat to the area  Ibuprofen as directed with food  Alternate with Tylenol  Amoxicillin as directed-take 1 tablet twice a day for the next 10 days  Follow-up with dentist as planned  If anything changes or worsens before then go to the ER

## 2019-12-22 ENCOUNTER — OFFICE VISIT (OUTPATIENT)
Dept: URGENT CARE | Facility: CLINIC | Age: 49
End: 2019-12-22
Payer: COMMERCIAL

## 2019-12-22 VITALS
SYSTOLIC BLOOD PRESSURE: 128 MMHG | DIASTOLIC BLOOD PRESSURE: 90 MMHG | TEMPERATURE: 96.9 F | OXYGEN SATURATION: 96 % | HEART RATE: 68 BPM | RESPIRATION RATE: 16 BRPM

## 2019-12-22 DIAGNOSIS — K13.79 MOUTH PAIN: Primary | ICD-10-CM

## 2019-12-22 PROCEDURE — G0382 LEV 3 HOSP TYPE B ED VISIT: HCPCS | Performed by: NURSE PRACTITIONER

## 2019-12-22 RX ORDER — AMOXICILLIN AND CLAVULANATE POTASSIUM 875; 125 MG/1; MG/1
1 TABLET, FILM COATED ORAL EVERY 12 HOURS SCHEDULED
Qty: 20 TABLET | Refills: 0
Start: 2019-12-22 | End: 2020-01-01

## 2019-12-22 RX ORDER — KETOROLAC TROMETHAMINE 30 MG/ML
30 INJECTION, SOLUTION INTRAMUSCULAR; INTRAVENOUS ONCE
Status: COMPLETED | OUTPATIENT
Start: 2019-12-22 | End: 2019-12-22

## 2019-12-22 RX ADMIN — KETOROLAC TROMETHAMINE 30 MG: 30 INJECTION, SOLUTION INTRAMUSCULAR; INTRAVENOUS at 19:52

## 2019-12-23 NOTE — PATIENT INSTRUCTIONS
Abscess   WHAT YOU NEED TO KNOW:   A warm compress may help your abscess drain  Your healthcare provider may make a cut in the abscess so it can drain  You may need surgery to remove an abscess that is on your hands or buttocks  DISCHARGE INSTRUCTIONS:   Return to the emergency department if:   · The area around your abscess becomes very painful, warm, or has red streaks  · You have a fever and chills  · Your heart is beating faster than usual      · You feel faint or confused  Contact your healthcare provider if:   · Your abscess gets bigger or does not get better  · Your abscess returns  · You have questions or concerns about your condition or care  Medicines: You may  need any of the following:  · Antibiotics  help treat a bacterial infection  · Acetaminophen  decreases pain and fever  It is available without a doctor's order  Ask how much to take and how often to take it  Follow directions  Acetaminophen can cause liver damage if not taken correctly  · NSAIDs , such as ibuprofen, help decrease swelling, pain, and fever  This medicine is available with or without a doctor's order  NSAIDs can cause stomach bleeding or kidney problems in certain people  If you take blood thinner medicine, always ask your healthcare provider if NSAIDs are safe for you  Always read the medicine label and follow directions  · Take your medicine as directed  Contact your healthcare provider if you think your medicine is not helping or if you have side effects  Tell him or her if you are allergic to any medicine  Keep a list of the medicines, vitamins, and herbs you take  Include the amounts, and when and why you take them  Bring the list or the pill bottles to follow-up visits  Carry your medicine list with you in case of an emergency  Self-care:   · Apply a warm compress to your abscess  This will help it open and drain  Wet a washcloth in warm, but not hot, water  Apply the compress for 10 minutes  Repeat this 4 times each day  Do not  press on an abscess or try to open it with a needle  You may push the bacteria deeper or into your blood  · Do not share your clothes, towels, or sheets with anyone  This can spread the infection to others  · Wash your hands often  This can help prevent the spread of germs  Use soap and water or an alcohol-based hand rub  Care for your wound after it is drained:   · Care for your wound as directed  If your healthcare provider says it is okay, carefully remove the bandage and gauze packing  You may need to soak the gauze to get it out of your wound  Clean your wound and the area around it as directed  Dry the area and put on new, clean bandages  Change your bandages when they get wet or dirty  · Ask your healthcare provider how to change the gauze in your wound  Keep track of how many pieces of gauze are placed inside the wound  Do not put too much packing in the wound  Do not pack the gauze too tightly in your wound  Follow up with your healthcare provider in 1 to 3 days: You may need to have your packing removed or your bandage changed  Write down your questions so you remember to ask them during your visits  © 2017 [employer]  Information is for End User's use only and may not be sold, redistributed or otherwise used for commercial purposes  All illustrations and images included in CareNotes® are the copyrighted property of [employer] , [employer]  or [employer]  The above information is an  only  It is not intended as medical advice for individual conditions or treatments  Talk to your doctor, nurse or pharmacist before following any medical regimen to see if it is safe and effective for you

## 2019-12-23 NOTE — PROGRESS NOTES
[facility]        NAME: Shannon Angel is a 47 y o  male  : 1970    MRN: [id number]  DATE: 2019  TIME: 8:11 PM    Assessment and Plan   Mouth pain [K13 79]  1  Mouth pain  ketorolac (TORADOL) injection 30 mg    amoxicillin-clavulanate (AUGMENTIN) 875-125 mg per tablet         Patient Instructions     Suspicions of abscess  Advised to f/u with PCP; may need an U/S or MRI  Maybe referral to oral surgeon  Follow up with PCP in 3-5 days  Proceed to  ER if symptoms worsen  Chief Complaint     Chief Complaint   Patient presents with    Oral Pain     Roof of mouth severe pain  Taking OTC meds and 1000mg tylenol with no relief  History of Present Illness       HPI   Patient was seen here about 3 weeks ago and treated with amox and toradol IM for mouth pain, possible tooth abscess  He went to his dentist and after getting an x-ray was told it is not his tooth  They were suspicious of a sinus infection  He was encouraged to finish the amox  3 days ago, the pain returned and he says he has been in a lot of pain  Severe  Constant  Located on the roof of his mouth  Was unable to sleep last night  Review of Systems   Review of Systems   Constitutional: Negative for chills and fever  Musculoskeletal: Positive for myalgias (roof of the mouth  )  Negative for neck pain and neck stiffness  Neurological: Negative for dizziness and headaches           Current Medications       Current Outpatient Medications:     amoxicillin-clavulanate (AUGMENTIN) 875-125 mg per tablet, Take 1 tablet by mouth every 12 (twelve) hours for 10 days, Disp: 20 tablet, Rfl: 0    budesonide-formoterol (SYMBICORT) 80-4 5 MCG/ACT inhaler, Inhale 2 puffs 2 (two) times a day Rinse mouth after use , Disp: 1 Inhaler, Rfl: 1    diclofenac sodium (VOLTAREN) 1 %, Apply 2 g topically 2 (two) times a day as needed (pain), Disp: 100 g, Rfl: 3    fluticasone (FLONASE) 50 mcg/act nasal spray, Use 2 sprays in each nostril at bedtime, Disp: 1 Bottle, Rfl: 1    ibuprofen (MOTRIN) 800 mg tablet, Take 1 tablet (800 mg total) by mouth every 6 (six) hours as needed for moderate pain, Disp: 15 tablet, Rfl: 0    loratadine-pseudoephedrine (CLARITIN-D 24-HOUR)  mg per 24 hr tablet, Take 1 tablet by mouth daily, Disp: , Rfl:     montelukast (SINGULAIR) 10 mg tablet, Take 1 tablet (10 mg total) by mouth daily at bedtime, Disp: 30 tablet, Rfl: 1    promethazine-codeine (PHENERGAN WITH CODEINE) 6 25-10 mg/5 mL syrup, Take 1-2 tsps every 6 hours as needed for cough (Patient not taking: Reported on 11/30/2019), Disp: 240 mL, Rfl: 0  No current facility-administered medications for this visit  Current Allergies     Allergies as of 12/22/2019 - Reviewed 12/22/2019   Allergen Reaction Noted    Chantix [varenicline] Palpitations 10/14/2017            The following portions of the patient's history were reviewed and updated as appropriate: allergies, current medications, past family history, past medical history, past social history, past surgical history and problem list      History reviewed  No pertinent past medical history  Past Surgical History:   Procedure Laterality Date    KNEE ARTHROSCOPY Bilateral     SHOULDER SURGERY Right 2014       Family History   Problem Relation Age of Onset    Alcohol abuse Mother         denies    Substance Abuse Mother         denies    Mental illness Mother         denies         Medications have been verified  Objective   /90   Pulse 68   Temp (!) 96 9 °F (36 1 °C)   Resp 16   SpO2 96%        Physical Exam     Physical Exam   HENT:   Examination of the mouth does not show any redness or drainage  However light palpation of the left side of the roof of the mouth elicited severe pain  Patient almost jumping off the table d/t the pain  Teeth look normal     Neck: Normal range of motion  Lymphadenopathy:     He has no cervical adenopathy

## 2020-08-06 ENCOUNTER — OFFICE VISIT (OUTPATIENT)
Dept: FAMILY MEDICINE CLINIC | Facility: HOSPITAL | Age: 50
End: 2020-08-06
Payer: COMMERCIAL

## 2020-08-06 VITALS
SYSTOLIC BLOOD PRESSURE: 128 MMHG | TEMPERATURE: 97 F | WEIGHT: 263.6 LBS | HEART RATE: 78 BPM | BODY MASS INDEX: 35.7 KG/M2 | HEIGHT: 72 IN | DIASTOLIC BLOOD PRESSURE: 88 MMHG

## 2020-08-06 DIAGNOSIS — Z13.6 SCREENING FOR CARDIOVASCULAR CONDITION: ICD-10-CM

## 2020-08-06 DIAGNOSIS — Z12.5 SCREENING PSA (PROSTATE SPECIFIC ANTIGEN): ICD-10-CM

## 2020-08-06 DIAGNOSIS — R53.83 OTHER FATIGUE: ICD-10-CM

## 2020-08-06 DIAGNOSIS — Z12.11 SCREENING FOR COLON CANCER: ICD-10-CM

## 2020-08-06 DIAGNOSIS — R68.82 LOW LIBIDO: ICD-10-CM

## 2020-08-06 DIAGNOSIS — R10.9 RIGHT FLANK PAIN: Primary | ICD-10-CM

## 2020-08-06 LAB
SL AMB  POCT GLUCOSE, UA: NORMAL
SL AMB LEUKOCYTE ESTERASE,UA: NEGATIVE
SL AMB POCT BILIRUBIN,UA: NEGATIVE
SL AMB POCT BLOOD,UA: NEGATIVE
SL AMB POCT CLARITY,UA: CLEAR
SL AMB POCT COLOR,UA: YELLOW
SL AMB POCT KETONES,UA: NEGATIVE
SL AMB POCT NITRITE,UA: NEGATIVE
SL AMB POCT PH,UA: 5
SL AMB POCT SPECIFIC GRAVITY,UA: 1.01
SL AMB POCT URINE PROTEIN: NORMAL
SL AMB POCT UROBILINOGEN: NORMAL

## 2020-08-06 PROCEDURE — 99214 OFFICE O/P EST MOD 30 MIN: CPT | Performed by: FAMILY MEDICINE

## 2020-08-06 PROCEDURE — 81002 URINALYSIS NONAUTO W/O SCOPE: CPT | Performed by: FAMILY MEDICINE

## 2020-08-06 PROCEDURE — 3725F SCREEN DEPRESSION PERFORMED: CPT | Performed by: FAMILY MEDICINE

## 2020-08-06 PROCEDURE — 1036F TOBACCO NON-USER: CPT | Performed by: FAMILY MEDICINE

## 2020-08-06 PROCEDURE — 3008F BODY MASS INDEX DOCD: CPT | Performed by: FAMILY MEDICINE

## 2020-08-07 ENCOUNTER — APPOINTMENT (OUTPATIENT)
Dept: LAB | Facility: HOSPITAL | Age: 50
End: 2020-08-07
Payer: COMMERCIAL

## 2020-08-07 DIAGNOSIS — Z13.6 SCREENING FOR CARDIOVASCULAR CONDITION: ICD-10-CM

## 2020-08-07 DIAGNOSIS — Z12.5 SCREENING PSA (PROSTATE SPECIFIC ANTIGEN): ICD-10-CM

## 2020-08-07 DIAGNOSIS — R68.82 LOW LIBIDO: ICD-10-CM

## 2020-08-07 DIAGNOSIS — R53.83 OTHER FATIGUE: ICD-10-CM

## 2020-08-07 LAB
ALBUMIN SERPL BCP-MCNC: 3.7 G/DL (ref 3.5–5)
ALP SERPL-CCNC: 55 U/L (ref 46–116)
ALT SERPL W P-5'-P-CCNC: 48 U/L (ref 12–78)
ANION GAP SERPL CALCULATED.3IONS-SCNC: 5 MMOL/L (ref 4–13)
AST SERPL W P-5'-P-CCNC: 24 U/L (ref 5–45)
BILIRUB SERPL-MCNC: 0.55 MG/DL (ref 0.2–1)
BUN SERPL-MCNC: 16 MG/DL (ref 5–25)
CALCIUM SERPL-MCNC: 8.8 MG/DL (ref 8.3–10.1)
CHLORIDE SERPL-SCNC: 108 MMOL/L (ref 100–108)
CHOLEST SERPL-MCNC: 198 MG/DL (ref 50–200)
CO2 SERPL-SCNC: 28 MMOL/L (ref 21–32)
CREAT SERPL-MCNC: 1.11 MG/DL (ref 0.6–1.3)
ERYTHROCYTE [DISTWIDTH] IN BLOOD BY AUTOMATED COUNT: 12.7 % (ref 11.6–15.1)
GFR SERPL CREATININE-BSD FRML MDRD: 76 ML/MIN/1.73SQ M
GLUCOSE P FAST SERPL-MCNC: 81 MG/DL (ref 65–99)
HCT VFR BLD AUTO: 46.8 % (ref 36.5–49.3)
HDLC SERPL-MCNC: 32 MG/DL
HGB BLD-MCNC: 15.7 G/DL (ref 12–17)
LDLC SERPL CALC-MCNC: 138 MG/DL (ref 0–100)
MAGNESIUM SERPL-MCNC: 2.4 MG/DL (ref 1.6–2.6)
MCH RBC QN AUTO: 30 PG (ref 26.8–34.3)
MCHC RBC AUTO-ENTMCNC: 33.5 G/DL (ref 31.4–37.4)
MCV RBC AUTO: 90 FL (ref 82–98)
PLATELET # BLD AUTO: 162 THOUSANDS/UL (ref 149–390)
PMV BLD AUTO: 11.7 FL (ref 8.9–12.7)
POTASSIUM SERPL-SCNC: 4 MMOL/L (ref 3.5–5.3)
PROT SERPL-MCNC: 7.3 G/DL (ref 6.4–8.2)
PSA SERPL-MCNC: 0.9 NG/ML (ref 0–4)
RBC # BLD AUTO: 5.23 MILLION/UL (ref 3.88–5.62)
SODIUM SERPL-SCNC: 141 MMOL/L (ref 136–145)
TRIGL SERPL-MCNC: 141 MG/DL
TSH SERPL DL<=0.05 MIU/L-ACNC: 2.93 UIU/ML (ref 0.36–3.74)
WBC # BLD AUTO: 6.58 THOUSAND/UL (ref 4.31–10.16)

## 2020-08-07 PROCEDURE — 84443 ASSAY THYROID STIM HORMONE: CPT

## 2020-08-07 PROCEDURE — 36415 COLL VENOUS BLD VENIPUNCTURE: CPT

## 2020-08-07 PROCEDURE — 84403 ASSAY OF TOTAL TESTOSTERONE: CPT

## 2020-08-07 PROCEDURE — G0103 PSA SCREENING: HCPCS

## 2020-08-07 PROCEDURE — 80061 LIPID PANEL: CPT

## 2020-08-07 PROCEDURE — 80053 COMPREHEN METABOLIC PANEL: CPT

## 2020-08-07 PROCEDURE — 85027 COMPLETE CBC AUTOMATED: CPT

## 2020-08-07 PROCEDURE — 83735 ASSAY OF MAGNESIUM: CPT

## 2020-08-07 PROCEDURE — 84402 ASSAY OF FREE TESTOSTERONE: CPT

## 2020-08-07 NOTE — PROGRESS NOTES
Assessment/Plan:      Problem List Items Addressed This Visit     None      Visit Diagnoses     Right flank pain    -  Primary    Relevant Orders    US kidney and bladder    POCT urine dip (Completed)    Screening for colon cancer        Relevant Orders    Ambulatory referral to Gastroenterology    Low libido        Relevant Orders    Testosterone, free, total    Other fatigue        Relevant Orders    CBC    Comprehensive metabolic panel    TSH, 3rd generation with Free T4 reflex    Magnesium    Screening for cardiovascular condition        Relevant Orders    Lipid Panel with Direct LDL reflex    Screening PSA (prostate specific antigen)        Relevant Orders    PSA, Total Screen         Right flank pain  Suspect more of musculoskeletal etiology  UA is negative  Will further evaluate for possible nephrolithiasis  Check renal US  Monitor sytmposm  Screening labs as previously advised  Check testosterone due to low libido and ongoing fatigue  Subjective:   Chief Complaint   Patient presents with    Flank Pain     Right side        Patient ID: Evy Cosme is a 45 y o  male  Here with right flank pain  No injury or trauma  No hematuria  No dysuria  Has been ongoing for weeks but intermittently  No aggravating or alleviating factors  Pain is mild to moderate  No nausea , vomting, diarrhea, no constipation  Pain is sharp  Also worried about low testostosterone  Family members (brothers) have needed testosterone replacements  Causing fatigue and low libido  The following portions of the patient's history were reviewed and updated as appropriate: allergies, current medications, past family history, past medical history, past social history, past surgical history and problem list     Review of Systems   Constitutional: Negative  Negative for activity change, appetite change, chills and diaphoresis     HENT: Negative for congestion and dental problem  Respiratory: Negative  Negative for apnea, chest tightness, shortness of breath and wheezing  Cardiovascular: Negative  Negative for chest pain, palpitations and leg swelling  Gastrointestinal: Negative  Negative for abdominal distention, abdominal pain, constipation, diarrhea and nausea  Genitourinary: Negative  Negative for difficulty urinating, dysuria and frequency  Objective:  Vitals:    08/06/20 1514   BP: 128/88   Pulse: 78   Temp: (!) 97 °F (36 1 °C)   Weight: 120 kg (263 lb 9 6 oz)   Height: 6' (1 829 m)     BP Readings from Last 6 Encounters:   08/06/20 128/88   12/22/19 128/90   11/30/19 112/80   07/06/19 122/80   06/26/19 118/78   06/23/19 110/74      Wt Readings from Last 6 Encounters:   08/06/20 120 kg (263 lb 9 6 oz)   11/30/19 117 kg (258 lb)   07/06/19 116 kg (256 lb)   06/26/19 119 kg (261 lb 12 8 oz)   06/23/19 117 kg (259 lb)   06/03/19 119 kg (262 lb)             Physical Exam   Constitutional: He is oriented to person, place, and time  He appears well-developed  He does not appear ill  No distress  HENT:   Head: Normocephalic and atraumatic  Right Ear: Tympanic membrane, external ear and ear canal normal    Left Ear: Tympanic membrane, external ear and ear canal normal    Nose: Nose normal  No rhinorrhea or congestion  Mouth/Throat: Mucous membranes are moist  No oropharyngeal exudate or posterior oropharyngeal erythema  Eyes: Pupils are equal, round, and reactive to light  Conjunctivae are normal    Neck: Normal range of motion  Neck supple  Cardiovascular: Normal rate, regular rhythm and normal heart sounds  Exam reveals no gallop and no friction rub  No murmur heard  Pulmonary/Chest: Effort normal and breath sounds normal  No respiratory distress  He has no wheezes  He has no rales  He exhibits no tenderness  Abdominal: Soft  Normal appearance and bowel sounds are normal  He exhibits no distension and no mass  There is no abdominal tenderness  There is no rebound and no guarding  Musculoskeletal: Normal range of motion  Arms:       Comments: Mild ttp the right upper back muscles  Appears to increase in pain with rotation of the torso  Neurological: He is alert and oriented to person, place, and time  Skin: Skin is warm  Capillary refill takes less than 2 seconds  Psychiatric: His behavior is normal  Mood normal    Nursing note and vitals reviewed          Office Visit on 08/06/2020   Component Date Value Ref Range Status    LEUKOCYTE ESTERASE,UA 08/06/2020 Negative   Final    NITRITE,UA 08/06/2020 Negative   Final    SL AMB POCT UROBILINOGEN 08/06/2020 Normal   Final    POCT URINE PROTEIN 08/06/2020 Trace   Final     PH,UA 08/06/2020 5   Final    BLOOD,UA 08/06/2020 Negative   Final    SPECIFIC GRAVITY,UA 08/06/2020 1 015   Final    KETONES,UA 08/06/2020 Negative   Final    BILIRUBIN,UA 08/06/2020 Negative   Final    GLUCOSE, UA 08/06/2020 Normal   Final     COLOR,UA 08/06/2020 Yellow   Final    CLARITY,UA 08/06/2020 Clear   Final

## 2020-08-08 LAB
TESTOST FREE SERPL-MCNC: 8.4 PG/ML (ref 7.2–24)
TESTOST SERPL-MCNC: 355 NG/DL (ref 264–916)

## 2020-08-20 ENCOUNTER — TELEPHONE (OUTPATIENT)
Dept: FAMILY MEDICINE CLINIC | Facility: HOSPITAL | Age: 50
End: 2020-08-20

## 2020-08-20 ENCOUNTER — HOSPITAL ENCOUNTER (OUTPATIENT)
Dept: ULTRASOUND IMAGING | Facility: HOSPITAL | Age: 50
Discharge: HOME | End: 2020-08-20
Payer: COMMERCIAL

## 2020-08-20 DIAGNOSIS — M54.9 BACK PAIN, UNSPECIFIED BACK LOCATION, UNSPECIFIED BACK PAIN LATERALITY, UNSPECIFIED CHRONICITY: Primary | ICD-10-CM

## 2020-08-20 DIAGNOSIS — R10.9 RIGHT FLANK PAIN: ICD-10-CM

## 2020-08-20 PROCEDURE — 76770 US EXAM ABDO BACK WALL COMP: CPT

## 2020-08-20 RX ORDER — METHOCARBAMOL 500 MG/1
500 TABLET, FILM COATED ORAL 3 TIMES DAILY
Qty: 30 TABLET | Refills: 0 | Status: SHIPPED | OUTPATIENT
Start: 2020-08-20 | End: 2020-08-28

## 2020-08-20 NOTE — TELEPHONE ENCOUNTER
Had his ultrasound today  He has major back and right side muscle spasm  Tylenol is not working  Was wondering about a muscle relaxer?   PCB

## 2020-08-21 ENCOUNTER — CLINICAL SUPPORT (OUTPATIENT)
Dept: GASTROENTEROLOGY | Facility: CLINIC | Age: 50
End: 2020-08-21

## 2020-08-21 VITALS — BODY MASS INDEX: 35.21 KG/M2 | WEIGHT: 260 LBS | HEIGHT: 72 IN

## 2020-08-21 DIAGNOSIS — Z12.11 SCREENING FOR COLON CANCER: Primary | ICD-10-CM

## 2020-08-21 PROCEDURE — 3008F BODY MASS INDEX DOCD: CPT | Performed by: FAMILY MEDICINE

## 2020-08-21 RX ORDER — SODIUM PICOSULFATE, MAGNESIUM OXIDE, AND ANHYDROUS CITRIC ACID 10; 3.5; 12 MG/160ML; G/160ML; G/160ML
LIQUID ORAL
Qty: 2 BOTTLE | Refills: 0 | Status: SHIPPED | OUTPATIENT
Start: 2020-08-21 | End: 2020-08-28 | Stop reason: HOSPADM

## 2020-08-21 NOTE — PROGRESS NOTES
Colon phone prep completed with pt and his wife; meds/allergies reviewed; clenpiq instructions reviewed and emailed to pt; rx forwarded to provider for approval

## 2020-08-28 ENCOUNTER — HOSPITAL ENCOUNTER (OUTPATIENT)
Dept: GASTROENTEROLOGY | Facility: AMBULATORY SURGERY CENTER | Age: 50
Discharge: HOME | End: 2020-08-28
Payer: COMMERCIAL

## 2020-08-28 ENCOUNTER — ANESTHESIA (OUTPATIENT)
Dept: GASTROENTEROLOGY | Facility: AMBULATORY SURGERY CENTER | Age: 50
End: 2020-08-28

## 2020-08-28 ENCOUNTER — ANESTHESIA EVENT (OUTPATIENT)
Dept: GASTROENTEROLOGY | Facility: AMBULATORY SURGERY CENTER | Age: 50
End: 2020-08-28

## 2020-08-28 VITALS
SYSTOLIC BLOOD PRESSURE: 108 MMHG | OXYGEN SATURATION: 96 % | TEMPERATURE: 97.8 F | RESPIRATION RATE: 18 BRPM | HEART RATE: 67 BPM | DIASTOLIC BLOOD PRESSURE: 64 MMHG

## 2020-08-28 DIAGNOSIS — Z12.11 SCREENING FOR COLON CANCER: ICD-10-CM

## 2020-08-28 PROCEDURE — G0121 COLON CA SCRN NOT HI RSK IND: HCPCS | Performed by: INTERNAL MEDICINE

## 2020-08-28 RX ORDER — PROPOFOL 10 MG/ML
INJECTION, EMULSION INTRAVENOUS AS NEEDED
Status: DISCONTINUED | OUTPATIENT
Start: 2020-08-28 | End: 2020-08-28

## 2020-08-28 RX ORDER — SODIUM CHLORIDE 9 MG/ML
50 INJECTION, SOLUTION INTRAVENOUS CONTINUOUS
Status: DISCONTINUED | OUTPATIENT
Start: 2020-08-28 | End: 2020-09-01 | Stop reason: HOSPADM

## 2020-08-28 RX ORDER — ACETAMINOPHEN 325 MG/1
650 TABLET ORAL EVERY 6 HOURS PRN
COMMUNITY

## 2020-08-28 RX ADMIN — PROPOFOL 50 MG: 10 INJECTION, EMULSION INTRAVENOUS at 11:29

## 2020-08-28 RX ADMIN — PROPOFOL 50 MG: 10 INJECTION, EMULSION INTRAVENOUS at 11:35

## 2020-08-28 RX ADMIN — SODIUM CHLORIDE 50 ML/HR: 9 INJECTION, SOLUTION INTRAVENOUS at 11:05

## 2020-08-28 RX ADMIN — PROPOFOL 150 MG: 10 INJECTION, EMULSION INTRAVENOUS at 11:25

## 2020-08-28 NOTE — H&P
History and Physical -  [facility]  Evy Cosme 45 y o  male MRN: [id number]                  HPI: Evy Cosme is a 45y o  year old male who presents for average risk, colon cancer screening  1st       REVIEW OF SYSTEMS: Per the HPI, and otherwise unremarkable      Historical Information   Past Medical History:   Diagnosis Date    Arthritis     right ankle, knee, hip    Seasonal allergies      Past Surgical History:   Procedure Laterality Date    KNEE ARTHROSCOPY Bilateral     ROTATOR CUFF REPAIR Right     labrum, rotator cuff and bicep tendon repairs    SHOULDER SURGERY Right 2014     Social History   Social History     Substance and Sexual Activity   Alcohol Use Yes    Frequency: 2-3 times a week     Social History     Substance and Sexual Activity   Drug Use No     Social History     Tobacco Use   Smoking Status Former Smoker    Types: Cigarettes   Smokeless Tobacco Current User    Types: Snuff     Family History   Problem Relation Age of Onset    Alcohol abuse Mother         denies    Substance Abuse Mother         denies    Mental illness Mother         denies    Colon polyps Neg Hx     Colon cancer Neg Hx        Meds/Allergies     Current Outpatient Medications:     acetaminophen (TYLENOL) 325 mg tablet    loratadine-pseudoephedrine (CLARITIN-D 24-HOUR)  mg per 24 hr tablet    Sod Picosulfate-Mag Ox-Cit Acd (Clenpiq) 10-3 5-12 MG-GM -GM/160ML SOLN    fluticasone (FLONASE) 50 mcg/act nasal spray    ibuprofen (MOTRIN) 800 mg tablet    Current Facility-Administered Medications:     sodium chloride 0 9 % infusion, 50 mL/hr, Intravenous, Continuous, 50 mL/hr at 08/28/20 1105    Allergies   Allergen Reactions    Chantix [Varenicline] Swelling     In legs       Objective     /75   Pulse 66   Temp 97 8 °F (36 6 °C) (Temporal)   Resp 14   SpO2 96%       PHYSICAL EXAM    Gen: NAD AAOx3  CV: S1S2 RRR no m/r/g  CHEST: Clear b/l no c/r/w  ABD: +BS soft, NT/ND  EXT: no edema      ASSESSMENT/PLAN:  This is a 45y o  year old male here for colonoscopy, and he is stable and optimized for his procedure

## 2020-08-28 NOTE — ANESTHESIA POSTPROCEDURE EVALUATION
Post-Op Assessment Note    CV Status:  Stable    Pain management: adequate     Mental Status:  Sleepy   Hydration Status:  Euvolemic   PONV Controlled:  Controlled   Airway Patency:  Patent      Post Op Vitals Reviewed: Yes      Staff: Anesthesiologist         No complications documented      BP      Temp      Pulse     Resp      SpO2

## 2020-08-28 NOTE — ANESTHESIA PREPROCEDURE EVALUATION
Procedure:  COLONOSCOPY    Relevant Problems   No relevant active problems        Physical Exam    Airway    Mallampati score: II  TM Distance: >3 FB  Neck ROM: full     Dental   No notable dental hx     Cardiovascular  Rhythm: regular, Rate: normal, Cardiovascular exam normal    Pulmonary  Pulmonary exam normal Breath sounds clear to auscultation,     Other Findings        Anesthesia Plan  ASA Score- 2     Anesthesia Type- IV sedation with anesthesia with ASA Monitors  Additional Monitors:   Airway Plan:     Comment: Benefits and risks of planned anesthetic discussed with patient, and agrees to proceed  I, Dr Zechariah Reardon, the attending anesthesiologist, have personally seen and evaluated the patient prior to anesthetic care  I have reviewed the preanesthetic record, and other medical records if appropriate to the anesthetic care  If a CRNA is involved in the case, I have reviewed the CRNA assessment, if present, and agree  The patient is in a suitable condition to proceed with my formulated anesthetic plan          Plan Factors-    Chart reviewed  Induction- intravenous  Postoperative Plan-     Informed Consent- Anesthetic plan and risks discussed with patient

## 2020-08-28 NOTE — DISCHARGE INSTRUCTIONS
Diverticulosis   WHAT YOU NEED TO KNOW:   What is diverticulosis? Diverticulosis is a condition that causes small pockets called diverticula to form in your intestine  These pockets make it difficult for bowel movements to pass through your digestive system  What causes diverticulosis? Diverticula form when muscles have to work hard to move bowel movements through the intestine  The force causes bulges to form at weak areas in the intestine  This may happen if you eat foods that are low in fiber  Fiber helps give your bowel movements more bulk so they are larger and easier to move through your colon  The following may increase your risk of diverticulosis:  · A history of constipation    · Age 36 or older    · Obesity    · Lack of exercise  What are the signs and symptoms of diverticulosis? Diverticulosis usually does not cause any signs or symptoms  It may cause any of the following in some people:  · Pain or discomfort in your lower abdomen    · Abdominal bloating    · Constipation or diarrhea  How is diverticulosis diagnosed? Your healthcare provider will examine you and ask about your bowel movements, diet, and symptoms  He or she will also ask about any medical conditions you have or medicines you take  You may need any of the following:  · Blood tests  may be done to check for signs of inflammation  · A barium enema  is an x-ray of your colon that may show diverticula  A tube is put into your anus, and a liquid called barium is put through the tube  Barium is used so that healthcare providers can see your colon more clearly  · Flexible sigmoidoscopy  is a test to look for any changes in your lower intestines and rectum  It may also show the cause of any bleeding or pain  A soft, bendable tube with a light on the end will be put into your anus  It will then be moved forward into your intestine  · A colonoscopy  is used to look at your whole colon   A scope (long bendable tube with a light on the end) is used to take pictures  This test may show diverticula  · A CT scan , or CAT scan, may show diverticula  You may be given contrast liquid before the scan  Tell the healthcare provider if you have ever had an allergic reaction to contrast liquid  How is diverticulosis managed? The goal of treatment is to manage any symptoms you have and prevent other problems such as diverticulitis  Diverticulitis is swelling or infection of the diverticula  Your healthcare provider may recommend any of the following:  · Eat a variety of high-fiber foods  High-fiber foods help you have regular bowel movements  High-fiber foods include cooked beans, fruits, vegetables, and some cereals  Most adults need 25 to 35 grams of fiber each day  Your healthcare provider may recommend that you have more  Ask your healthcare provider how much fiber you need  Increase fiber slowly  You may have abdominal discomfort, bloating, and gas if you add fiber to your diet too quickly  You may need to take a fiber supplement if you are not getting enough fiber from food  · Medicines  to soften your bowel movements may be given  You may also need medicines to treat symptoms such as bloating and pain  · Drink liquids as directed  You may need to drink 2 to 3 liters (8 to 12 cups) of liquids every day  Ask your healthcare provider how much liquid to drink each day and which liquids are best for you  · Apply heat  on your abdomen for 20 to 30 minutes every 2 hours for as many days as directed  Heat helps decrease pain and muscle spasms  How can I help prevent diverticulitis or other symptoms? The following may help decrease your risk for diverticulitis or symptoms, such as bleeding  Talk to your provider about these or other things you can do to prevent problems that may occur with diverticulosis  · Exercise regularly  Ask your healthcare provider about the best exercise plan for you   Exercise can help you have regular bowel movements  Get 30 minutes of exercise on most days of the week  · Maintain a healthy weight  Ask your healthcare provider how much you should weigh  Ask him or her to help you create a weight loss plan if you are overweight  · Do not smoke  Nicotine and other chemicals in cigarettes increase your risk for diverticulitis  Ask your healthcare provider for information if you currently smoke and need help to quit  E-cigarettes or smokeless tobacco still contain nicotine  Talk to your healthcare provider before you use these products  · Ask your healthcare provider if it is safe to take NSAIDs  NSAIDs may increase your risk of diverticulitis  When should I seek immediate care? · You have severe pain on the left side of your lower abdomen  · Your bowel movements are bright or dark red  When should I contact my healthcare provider? · You have a fever and chills  · You feel dizzy or lightheaded  · You have nausea, or you are vomiting  · You have a change in your bowel movements  · You have questions or concerns about your condition or care  CARE AGREEMENT:   You have the right to help plan your care  Learn about your health condition and how it may be treated  Discuss treatment options with your caregivers to decide what care you want to receive  You always have the right to refuse treatment  The above information is an  only  It is not intended as medical advice for individual conditions or treatments  Talk to your doctor, nurse or pharmacist before following any medical regimen to see if it is safe and effective for you  © 2017 [employer] Information is for End User's use only and may not be sold, redistributed or otherwise used for commercial purposes  All illustrations and images included in CareNotes® are the copyrighted property of [employer]  or [employer]  Hemorrhoids   WHAT YOU NEED TO KNOW:   What are hemorrhoids? Hemorrhoids are swollen blood vessels inside your rectum (internal hemorrhoids) or on your anus (external hemorrhoids)  Sometimes a hemorrhoid may prolapse  This means it extends out of your anus  What increases my risk for hemorrhoids? · Pregnancy or obesity    · Straining or sitting for a long time during bowel movements    · Liver disease    · Weak muscles around the anus caused by older age, rectal surgery, or anal intercourse    · A lack of physical activity    · Chronic diarrhea or constipation    · A low-fiber diet  What are the signs and symptoms of hemorrhoids? · Pain or itching around your anus or inside your rectum    · Swelling or bumps around your anus    · Bright red blood in your bowel movement, on the toilet paper, or in the toilet bowl    · Tissue bulging out of your anus (prolapsed hemorrhoids)    · Incontinence (poor control over urine or bowel movements)  How are hemorrhoids diagnosed? Your healthcare provider will ask about your symptoms, the foods you eat, and your bowel movements  He will examine your anus for external hemorrhoids  You may need the following:  · A digital rectal exam  is a test to check for hemorrhoids  Your healthcare provider will put a gloved finger inside your anus to feel for the hemorrhoids  · An anoscopy  is a test that uses a scope (small tube with a light and camera on the end) to look at your hemorrhoids  How are hemorrhoids treated? Treatment will depend on your symptoms  You may need any of the following:  · Medicines  can help decrease pain and swelling, and soften your bowel movement  The medicine may be a pill, pad, cream, or ointment  · Procedures  may be used to shrink or remove your hemorrhoid  Examples include rubber-band ligation, sclerotherapy, and photocoagulation  These procedures may be done in your healthcare provider's office  Ask your healthcare provider for more information about these procedures       · Surgery  may be needed to shrink or remove your hemorrhoids  How can I manage my symptoms? · Apply ice on your anus for 15 to 20 minutes every hour or as directed  Use an ice pack, or put crushed ice in a plastic bag  Cover it with a towel before you apply it to your anus  Ice helps prevent tissue damage and decreases swelling and pain  · Take a sitz bath  Fill a bathtub with 4 to 6 inches of warm water  You may also use a sitz bath pan that fits inside a toilet bowl  Sit in the sitz bath for 15 minutes  Do this 3 times a day, and after each bowel movement  The warm water can help decrease pain and swelling  · Keep your anal area clean  Gently wash the area with warm water daily  Soap may irritate the area  After a bowel movement, wipe with moist towelettes or wet toilet paper  Dry toilet paper can irritate the area  How can I help prevent hemorrhoids? · Do not strain to have a bowel movement  Do not sit on the toilet too long  These actions can increase pressure on the tissues in your rectum and anus  · Drink plenty of liquids  Liquids can help prevent constipation  Ask how much liquid to drink each day and which liquids are best for you  · Eat a variety of high-fiber foods  Examples include fruits, vegetables, and whole grains  Ask your healthcare provider how much fiber you need each day  You may need to take a fiber supplement  · Exercise as directed  Exercise, such as walking, may make it easier to have a bowel movement  Ask your healthcare provider to help you create an exercise plan  · Do not have anal sex  Anal sex can weaken the skin around your rectum and anus  · Avoid heavy lifting  This can cause straining and increase your risk for another hemorrhoid  When should I seek immediate care? · You have severe pain in your rectum or around your anus  · You have severe pain in your abdomen and you are vomiting       · You have bleeding from your anus that soaks through your underwear  When should I contact my healthcare provider? · You have frequent and painful bowel movements  · Your hemorrhoid looks or feels more swollen than usual      · You do not have a bowel movement for 2 days or more  · You see or feel tissue coming through your anus  · You have questions or concerns about your condition or care  CARE AGREEMENT:   You have the right to help plan your care  Learn about your health condition and how it may be treated  Discuss treatment options with your caregivers to decide what care you want to receive  You always have the right to refuse treatment  The above information is an  only  It is not intended as medical advice for individual conditions or treatments  Talk to your doctor, nurse or pharmacist before following any medical regimen to see if it is safe and effective for you  © 2017 [employer] Information is for End User's use only and may not be sold, redistributed or otherwise used for commercial purposes  All illustrations and images included in CareNotes® are the copyrighted property of [employer]  or [employer]

## 2021-02-10 ENCOUNTER — TELEMEDICINE (OUTPATIENT)
Dept: FAMILY MEDICINE CLINIC | Facility: HOSPITAL | Age: 51
End: 2021-02-10
Payer: COMMERCIAL

## 2021-02-10 VITALS — BODY MASS INDEX: 35.21 KG/M2 | TEMPERATURE: 97.7 F | HEIGHT: 72 IN | WEIGHT: 260 LBS

## 2021-02-10 DIAGNOSIS — B34.9 VIRAL INFECTION, UNSPECIFIED: ICD-10-CM

## 2021-02-10 DIAGNOSIS — B34.9 VIRAL INFECTION, UNSPECIFIED: Primary | ICD-10-CM

## 2021-02-10 PROCEDURE — U0003 INFECTIOUS AGENT DETECTION BY NUCLEIC ACID (DNA OR RNA); SEVERE ACUTE RESPIRATORY SYNDROME CORONAVIRUS 2 (SARS-COV-2) (CORONAVIRUS DISEASE [COVID-19]), AMPLIFIED PROBE TECHNIQUE, MAKING USE OF HIGH THROUGHPUT TECHNOLOGIES AS DESCRIBED BY CMS-2020-01-R: HCPCS | Performed by: NURSE PRACTITIONER

## 2021-02-10 PROCEDURE — U0005 INFEC AGEN DETEC AMPLI PROBE: HCPCS | Performed by: NURSE PRACTITIONER

## 2021-02-10 PROCEDURE — 99214 OFFICE O/P EST MOD 30 MIN: CPT | Performed by: NURSE PRACTITIONER

## 2021-02-10 NOTE — PROGRESS NOTES
COVID-19 Virtual Visit     Assessment/Plan:    Problem List Items Addressed This Visit     None      Visit Diagnoses     Viral infection, unspecified    -  Primary    Relevant Orders    Novel Coronavirus (Covid-19),PCR [facility] - Collected at [facility]         Disposition:     I referred patient to one of our centralized sites for a COVID-19 swab  Symptoms are likely viral and need to r/o COVID although pt is not happy about this  I did discuss with pt that if COVID negative his symptoms are still likely viral and antibiotic is not indicated  I recommend he start OTC decongestants and Flonase  I have spent 10 minutes directly with the patient  Greater than 50% of this time was spent in counseling/coordination of care regarding: instructions for management and impressions  Encounter provider SYLVIA Rabago    Provider located at [address] [place] [address] MD  [address] [place] [address]    Recent Visits  No visits were found meeting these conditions  Showing recent visits within past 7 days and meeting all other requirements     Today's Visits  Date Type Provider [facility]   [facility]   Showing today's visits and meeting all other requirements     Future Appointments  No visits were found meeting these conditions  Showing future appointments within next 150 days and meeting all other requirements      This virtual check-in was done via [employer] and patient was informed that this is not a secure, HIPAA-compliant platform  He agrees to proceed  Patient agrees to participate in a virtual check in via telephone or video visit instead of presenting to the office to address urgent/immediate medical needs  Patient is aware this is a billable service  After connecting through [facility], the patient was identified by name and date of birth   Bishop Dawsoncoco was informed that this was a telemedicine visit and that the exam was being conducted confidentially over secure lines  My office door was closed  No one else was in the room  Pb Wu acknowledged consent and understanding of privacy and security of the telemedicine visit  I informed the patient that I have reviewed his record in Epic and presented the opportunity for him to ask any questions regarding the visit today  The patient agreed to participate  Subjective:   Pb Wu is a 45 y o  male who is concerned about COVID-19  Patient's symptoms include fatigue, nasal congestion and headache  Patient denies fever, chills, malaise, rhinorrhea, sore throat, anosmia, loss of taste, cough, shortness of breath, chest tightness, abdominal pain, nausea, vomiting, diarrhea and myalgias  Date of symptom onset: 2/8/2021    Exposure:   Contact with a person who is under investigation (PUI) for or who is positive for COVID-19 within the last 14 days?: No    Hospitalized recently for fever and/or lower respiratory symptoms?: No      Currently a healthcare worker that is involved in direct patient care?: No      Works in a special setting where the risk of COVID-19 transmission may be high? (this may include long-term care, correctional and alf facilities; homeless shelters; assisted-living facilities and group homes ): No      Resident in a special setting where the risk of COVID-19 transmission may be high? 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  Stays home most of the time  He has not tried any OTC decongestants or nose spray  States they never work for him       No results found for: Santos Townsend  Past Medical History:   Diagnosis Date    Arthritis     right ankle, knee, hip    Seasonal allergies      Past Surgical History:   Procedure Laterality Date    KNEE ARTHROSCOPY Bilateral     ROTATOR CUFF REPAIR Right     labrum, rotator cuff and bicep tendon repairs    SHOULDER SURGERY Right 2014     Current Outpatient Medications   Medication Sig Dispense Refill    acetaminophen (TYLENOL) 325 mg tablet Take 650 mg by mouth every 6 (six) hours as needed for mild pain      fluticasone (FLONASE) 50 mcg/act nasal spray Use 2 sprays in each nostril at bedtime 1 Bottle 1    ibuprofen (MOTRIN) 800 mg tablet Take 1 tablet (800 mg total) by mouth every 6 (six) hours as needed for moderate pain 15 tablet 0    loratadine-pseudoephedrine (CLARITIN-D 24-HOUR)  mg per 24 hr tablet Take 1 tablet by mouth daily       No current facility-administered medications for this visit  Allergies   Allergen Reactions    Chantix [Varenicline] Swelling     In legs       Review of Systems   Constitutional: Positive for fatigue  Negative for chills and fever  HENT: Positive for congestion  Negative for rhinorrhea and sore throat  Respiratory: Negative for cough, chest tightness and shortness of breath  Gastrointestinal: Negative for abdominal pain, diarrhea, nausea and vomiting  Musculoskeletal: Negative for myalgias  Neurological: Positive for headaches  Objective:    Vitals:    02/10/21 1407   Temp: 97 7 °F (36 5 °C)   Weight: 118 kg (260 lb)   Height: 6' (1 829 m)       Physical Exam  Vitals signs reviewed  Constitutional:       Appearance: Normal appearance  Pulmonary:      Effort: Pulmonary effort is normal    Neurological:      Mental Status: He is alert and oriented to person, place, and time  Psychiatric:         Mood and Affect: Mood normal          Behavior: Behavior normal          Thought Content:  Thought content normal          Judgment: Judgment normal        VIRTUAL VISIT DISCLAIMER    Robby Colunga acknowledges that he has consented to an online visit or consultation  He understands that the online visit is based solely on information provided by him, and that, in the absence of a face-to-face physical evaluation by the physician, the diagnosis he receives is both limited and provisional in terms of accuracy and completeness  This is not intended to replace a full medical face-to-face evaluation by the physician  Robby Cristine understands and accepts these terms

## 2021-02-11 LAB — SARS-COV-2 RNA RESP QL NAA+PROBE: NEGATIVE

## 2021-04-01 ENCOUNTER — IMMUNIZATIONS (OUTPATIENT)
Dept: FAMILY MEDICINE CLINIC | Facility: HOSPITAL | Age: 51
End: 2021-04-01

## 2021-04-01 DIAGNOSIS — Z23 ENCOUNTER FOR IMMUNIZATION: Primary | ICD-10-CM

## 2021-04-01 PROCEDURE — 91300 SARS-COV-2 / COVID-19 MRNA VACCINE (PFIZER-BIONTECH) 30 MCG: CPT

## 2021-04-01 PROCEDURE — 0001A SARS-COV-2 / COVID-19 MRNA VACCINE (PFIZER-BIONTECH) 30 MCG: CPT

## 2021-04-24 ENCOUNTER — IMMUNIZATIONS (OUTPATIENT)
Dept: FAMILY MEDICINE CLINIC | Facility: HOSPITAL | Age: 51
End: 2021-04-24

## 2021-04-24 DIAGNOSIS — Z23 ENCOUNTER FOR IMMUNIZATION: Primary | ICD-10-CM

## 2021-04-24 PROCEDURE — 0002A SARS-COV-2 / COVID-19 MRNA VACCINE (PFIZER-BIONTECH) 30 MCG: CPT

## 2021-04-24 PROCEDURE — 91300 SARS-COV-2 / COVID-19 MRNA VACCINE (PFIZER-BIONTECH) 30 MCG: CPT

## 2021-04-27 ENCOUNTER — OFFICE VISIT (OUTPATIENT)
Dept: URGENT CARE | Facility: CLINIC | Age: 51
End: 2021-04-27
Payer: COMMERCIAL

## 2021-04-27 VITALS — HEART RATE: 69 BPM | TEMPERATURE: 98 F | RESPIRATION RATE: 20 BRPM | OXYGEN SATURATION: 98 %

## 2021-04-27 DIAGNOSIS — J30.2 SEASONAL ALLERGIC RHINITIS, UNSPECIFIED TRIGGER: ICD-10-CM

## 2021-04-27 DIAGNOSIS — R05.9 COUGH: ICD-10-CM

## 2021-04-27 DIAGNOSIS — J30.2 SEASONAL ALLERGIES: Primary | ICD-10-CM

## 2021-04-27 PROCEDURE — G0382 LEV 3 HOSP TYPE B ED VISIT: HCPCS | Performed by: FAMILY MEDICINE

## 2021-04-27 RX ORDER — PREDNISONE 20 MG/1
40 TABLET ORAL DAILY
Qty: 10 TABLET | Refills: 0 | Status: SHIPPED | OUTPATIENT
Start: 2021-04-27 | End: 2021-05-02

## 2021-04-27 RX ORDER — FLUTICASONE PROPIONATE 50 MCG
SPRAY, SUSPENSION (ML) NASAL
Qty: 1 BOTTLE | Refills: 1 | Status: SHIPPED | OUTPATIENT
Start: 2021-04-27

## 2021-04-27 RX ORDER — CETIRIZINE HYDROCHLORIDE 10 MG/1
10 TABLET ORAL DAILY
COMMUNITY

## 2021-04-27 RX ORDER — ALBUTEROL SULFATE 90 UG/1
2 AEROSOL, METERED RESPIRATORY (INHALATION) EVERY 6 HOURS PRN
Qty: 18 G | Refills: 0 | Status: SHIPPED | OUTPATIENT
Start: 2021-04-27

## 2021-04-27 NOTE — PROGRESS NOTES
[facility]        NAME: Alana Tomlin is a 45 y o  male  : 1970    MRN: [id number]  DATE: 2021  TIME: 8:41 AM    Assessment and Plan   Seasonal allergies [J30 2]  1  Seasonal allergies  predniSONE 20 mg tablet   2  Cough  fluticasone (FLONASE) 50 mcg/act nasal spray    subacute, probably allergic RAD given no response to antibiotic and oral steroid; start singulair, inhaler, & nasal steroid as rx'd, use mucinex in addition   3  Seasonal allergic rhinitis, unspecified trigger  fluticasone (FLONASE) 50 mcg/act nasal spray    continue daily antihistamine and start meds as rx'd         Patient Instructions       Follow up with PCP in 3-5 days  Proceed to  ER if symptoms worsen  Chief Complaint     Chief Complaint   Patient presents with    Allergic Reaction     HX fo allergies this time of year         History of Present Illness       54-year-old male presenting today after waking up with itchy watery eyes, swelling eyelids and wheezing with breathing  He states that he begins to experience the symptoms when the pollen count is high and had a similar episode approximately 3 years ago  At that time he states he responded well with using a prescribed inhaler  He has been using Zyrtec up to this point with minimal relief  Denies any fevers or chills  Denies any loss of taste or smell  Review of Systems   Review of Systems   Constitutional: Negative  HENT: Positive for congestion and sneezing  Eyes: Negative  Respiratory: Positive for cough and wheezing  Negative for shortness of breath  Cardiovascular: Negative  Gastrointestinal: Negative  Genitourinary: Negative  Skin: Negative  Allergic/Immunologic: Negative  Neurological: Negative  Hematological: Negative  Psychiatric/Behavioral: Negative            Current Medications       Current Outpatient Medications:     acetaminophen (TYLENOL) 325 mg tablet, Take 650 mg by mouth every 6 (six) hours as needed for mild pain, Disp: , Rfl:     cetirizine (ZyrTEC) 10 mg tablet, Take 10 mg by mouth daily, Disp: , Rfl:     fluticasone (FLONASE) 50 mcg/act nasal spray, Use 2 sprays in each nostril at bedtime, Disp: 1 Bottle, Rfl: 1    ibuprofen (MOTRIN) 800 mg tablet, Take 1 tablet (800 mg total) by mouth every 6 (six) hours as needed for moderate pain, Disp: 15 tablet, Rfl: 0    loratadine-pseudoephedrine (CLARITIN-D 24-HOUR)  mg per 24 hr tablet, Take 1 tablet by mouth daily, Disp: , Rfl:     predniSONE 20 mg tablet, Take 2 tablets (40 mg total) by mouth daily for 5 days, Disp: 10 tablet, Rfl: 0    Current Allergies     Allergies as of 04/27/2021 - Reviewed 04/27/2021   Allergen Reaction Noted    Chantix [varenicline] Swelling 10/14/2017            The following portions of the patient's history were reviewed and updated as appropriate: allergies, current medications, past family history, past medical history, past social history, past surgical history and problem list      Past Medical History:   Diagnosis Date    Arthritis     right ankle, knee, hip    Seasonal allergies        Past Surgical History:   Procedure Laterality Date    KNEE ARTHROSCOPY Bilateral     ROTATOR CUFF REPAIR Right     labrum, rotator cuff and bicep tendon repairs    SHOULDER SURGERY Right 2014       Family History   Problem Relation Age of Onset    Alcohol abuse Mother         denies    Substance Abuse Mother         denies    Mental illness Mother         denies    Colon polyps Neg Hx     Colon cancer Neg Hx          Medications have been verified  Objective   Pulse 69   Temp 98 °F (36 7 °C)   Resp 20   SpO2 98%   No LMP for male patient  Physical Exam     Physical Exam  Constitutional:       Appearance: He is well-developed  Eyes:      Pupils: Pupils are equal, round, and reactive to light  Neck:      Musculoskeletal: Normal range of motion     Pulmonary:      Effort: Pulmonary effort is normal  Musculoskeletal: Normal range of motion  Skin:     General: Skin is warm  Neurological:      Mental Status: He is alert

## 2021-05-01 ENCOUNTER — APPOINTMENT (OUTPATIENT)
Dept: LAB | Facility: HOSPITAL | Age: 51
End: 2021-05-01

## 2021-05-01 ENCOUNTER — TRANSCRIBE ORDERS (OUTPATIENT)
Dept: LAB | Facility: HOSPITAL | Age: 51
End: 2021-05-01

## 2021-05-01 DIAGNOSIS — Z00.8 HEALTH EXAMINATION IN POPULATION SURVEY: ICD-10-CM

## 2021-05-01 DIAGNOSIS — Z00.8 HEALTH EXAMINATION IN POPULATION SURVEY: Primary | ICD-10-CM

## 2021-05-01 LAB
CHOLEST SERPL-MCNC: 199 MG/DL (ref 50–200)
EST. AVERAGE GLUCOSE BLD GHB EST-MCNC: 105 MG/DL
HBA1C MFR BLD: 5.3 %
HDLC SERPL-MCNC: 38 MG/DL
LDLC SERPL CALC-MCNC: 133 MG/DL (ref 0–100)
NONHDLC SERPL-MCNC: 161 MG/DL
TRIGL SERPL-MCNC: 140 MG/DL

## 2021-05-01 PROCEDURE — 83036 HEMOGLOBIN GLYCOSYLATED A1C: CPT

## 2021-05-01 PROCEDURE — 80061 LIPID PANEL: CPT

## 2021-05-01 PROCEDURE — 36415 COLL VENOUS BLD VENIPUNCTURE: CPT

## 2021-07-21 ENCOUNTER — OFFICE VISIT (OUTPATIENT)
Dept: FAMILY MEDICINE CLINIC | Facility: HOSPITAL | Age: 51
End: 2021-07-21
Payer: COMMERCIAL

## 2021-07-21 VITALS
SYSTOLIC BLOOD PRESSURE: 120 MMHG | DIASTOLIC BLOOD PRESSURE: 78 MMHG | HEART RATE: 76 BPM | TEMPERATURE: 97.4 F | WEIGHT: 264 LBS | BODY MASS INDEX: 35.76 KG/M2 | HEIGHT: 72 IN

## 2021-07-21 DIAGNOSIS — M25.571 CHRONIC PAIN OF RIGHT ANKLE: ICD-10-CM

## 2021-07-21 DIAGNOSIS — M25.559 HIP PAIN: ICD-10-CM

## 2021-07-21 DIAGNOSIS — Z00.00 ANNUAL PHYSICAL EXAM: ICD-10-CM

## 2021-07-21 DIAGNOSIS — M25.50 POLYARTHRALGIA: ICD-10-CM

## 2021-07-21 DIAGNOSIS — M17.11 PRIMARY OSTEOARTHRITIS OF RIGHT KNEE: Primary | ICD-10-CM

## 2021-07-21 DIAGNOSIS — G89.29 CHRONIC PAIN OF RIGHT ANKLE: ICD-10-CM

## 2021-07-21 PROCEDURE — 99396 PREV VISIT EST AGE 40-64: CPT | Performed by: FAMILY MEDICINE

## 2021-07-22 NOTE — PATIENT INSTRUCTIONS

## 2021-07-22 NOTE — PROGRESS NOTES
ADULT ANNUAL [facility] [address]     NAME: Michele Estrella  AGE: 45 y o  SEX: male  : 1970     DATE: 2021     Assessment and Plan:     Problem List Items Addressed This Visit     None      Visit Diagnoses     Primary osteoarthritis of right knee    -  Primary    Polyarthralgia        Relevant Orders    CBC    Comprehensive metabolic panel    Lyme Total Antibody Profile with reflex to WB    DELTA Screen w/ Reflex to Titer/Pattern    C-reactive protein    Uric acid    RF Screen w/ Reflex to Titer    Cyclic citrul peptide antibody, IgG    Hip pain        Relevant Orders    XR hip/pelv 2-3 vws right if performed    Chronic pain of right ankle        Relevant Orders    XR ankle 3+ vw right    Annual physical exam            Polyarthrlagia  With mornign stiffness > 30 minutes  Multiple joint pains to include knees, fingers, shoulders, hips  Evaluate for RA, autuimmune disease, lyme  Known OA of the right knee  Advise fu with Ortho for further management to include possibly surgical intervention  In the meantime  May take tylenol, max 4g/day  No more than 1 g at one time  May combine or alternate with nsaids wuch as motrin 400-600 mg prn  Immunizations and preventive care screenings were discussed with patient today  Appropriate education was printed on patient's after visit summary  Counseling:  Alcohol/drug use: discussed moderation in alcohol intake, the recommendations for healthy alcohol use, and avoidance of illicit drug use  Dental Health: discussed importance of regular tooth brushing, flossing, and dental visits  · Exercise: the importance of regular exercise/physical activity was discussed  Recommend exercise 3-5 times per week for at least 30 minutes  No follow-ups on file       Chief Complaint:     Chief Complaint   Patient presents with    Annual Exam    Joint Pain     Right knee, right ankle, right hip     Edema     B/L      History of Present Illness:     Adult Annual Physical   Patient here for a comprehensive physical exam  The patient reports   Multiple joint pains  Hip pain, knee pain, ankle pain, fingers  Stiffness in the morning  Some bilateral leg swelling  Occasional swellling of the joints  No redness, no fever, no chills  Has been ongoing for years but appears to be worsening  Using tylenol 1500 mg in the morning and not always helping  Has not taken nsaids  Diet and Physical Activity  · Diet/Nutrition: well balanced diet  · Exercise: no formal exercise  Depression Screening  PHQ-9 Depression Screening    PHQ-9:   Frequency of the following problems over the past two weeks:      Little interest or pleasure in doing things: 0 - not at all  Feeling down, depressed, or hopeless: 0 - not at all  PHQ-2 Score: 0       General Health  · Sleep: sleeps well  · Hearing: normal - bilateral   · Vision: no vision problems  · Dental: regular dental visits   Health  · Symptoms include: none     Review of Systems:     Review of Systems   Constitutional: Positive for activity change  Negative for appetite change, chills, diaphoresis and fever  HENT: Negative  Respiratory: Negative for cough, chest tightness and shortness of breath  Cardiovascular: Negative for chest pain  Gastrointestinal: Negative  Negative for abdominal distention  Genitourinary: Negative  Musculoskeletal: Positive for arthralgias and joint swelling  Negative for back pain, gait problem and neck stiffness        Past Medical History:     Past Medical History:   Diagnosis Date    Arthritis     right ankle, knee, hip    Seasonal allergies       Past Surgical History:     Past Surgical History:   Procedure Laterality Date    KNEE ARTHROSCOPY Bilateral     ROTATOR CUFF REPAIR Right     labrum, rotator cuff and bicep tendon repairs    SHOULDER SURGERY Right 2014 Family History:     Family History   Problem Relation Age of Onset    Alcohol abuse Mother         denies    Substance Abuse Mother         denies    Mental illness Mother         denies    Colon polyps Neg Hx     Colon cancer Neg Hx       Social History:     Social History     Socioeconomic History    Marital status: /Civil Union     Spouse name: None    Number of children: None    Years of education: None    Highest education level: None   Occupational History    None   Tobacco Use    Smoking status: Former Smoker     Types: Cigarettes    Smokeless tobacco: Current User     Types: Snuff   Vaping Use    Vaping Use: Never used   Substance and Sexual Activity    Alcohol use: Yes    Drug use: No    Sexual activity: None   Other Topics Concern    None   Social History Narrative    None     Social Determinants of Health     Financial Resource Strain:     Difficulty of Paying Living Expenses:    Food Insecurity:     Worried About Running Out of Food in the Last Year:     [address] in the Last Year:    Transportation Needs:     Lack of Transportation (Medical):      Lack of Transportation (Non-Medical):    Physical Activity:     Days of Exercise per Week:     Minutes of Exercise per Session:    Stress:     Feeling of Stress :    Social Connections:     Frequency of Communication with Friends and Family:     Frequency of Social Gatherings with Friends and Family:     Attends [employer] Services:     Active Member of Clubs or Organizations:     Attends Club or Organization Meetings:     Marital Status:    Intimate Partner Violence:     Fear of Current or Ex-Partner:     Emotionally Abused:     Physically Abused:     Sexually Abused:       Current Medications:     Current Outpatient Medications   Medication Sig Dispense Refill    acetaminophen (TYLENOL) 325 mg tablet Take 650 mg by mouth every 6 (six) hours as needed for mild pain      cetirizine (ZyrTEC) 10 mg tablet Take 10 mg by mouth daily      fluticasone (FLONASE) 50 mcg/act nasal spray Use 2 sprays in each nostril at bedtime 1 Bottle 1    albuterol (Ventolin HFA) 90 mcg/act inhaler Inhale 2 puffs every 6 (six) hours as needed for wheezing (Patient not taking: Reported on 7/21/2021) 18 g 0     No current facility-administered medications for this visit  Allergies: Allergies   Allergen Reactions    Chantix [Varenicline] Swelling     In legs      Physical Exam:     /78   Pulse 76   Temp (!) 97 4 °F (36 3 °C)   Ht 6' (1 829 m)   Wt 120 kg (264 lb)   BMI 35 80 kg/m²     Physical Exam  Vitals and nursing note reviewed  Constitutional:       General: He is not in acute distress  Appearance: Normal appearance  He is well-developed and normal weight  He is not ill-appearing  HENT:      Head: Normocephalic and atraumatic  Right Ear: Tympanic membrane, ear canal and external ear normal       Left Ear: Tympanic membrane, ear canal and external ear normal       Mouth/Throat:      Mouth: Mucous membranes are moist       Pharynx: Oropharynx is clear  Eyes:      Extraocular Movements: Extraocular movements intact  Conjunctiva/sclera: Conjunctivae normal       Pupils: Pupils are equal, round, and reactive to light  Cardiovascular:      Rate and Rhythm: Normal rate and regular rhythm  Heart sounds: Normal heart sounds  No murmur heard  Pulmonary:      Effort: Pulmonary effort is normal       Breath sounds: Normal breath sounds  Abdominal:      General: Bowel sounds are normal  There is no distension  Palpations: Abdomen is soft  There is no mass  Tenderness: There is no abdominal tenderness  There is no guarding  Hernia: No hernia is present  Genitourinary:     Penis: Normal     Musculoskeletal:      Right hand: Normal  No deformity  Left hand: Normal  No deformity  Cervical back: Normal range of motion and neck supple        Right knee: No swelling, deformity, effusion, erythema or bony tenderness  Decreased range of motion  Skin:     General: Skin is warm and dry  Capillary Refill: Capillary refill takes less than 2 seconds  Neurological:      General: No focal deficit present  Mental Status: He is alert and oriented to person, place, and time  Psychiatric:         Mood and Affect: Mood normal          Behavior: Behavior normal          Thought Content:  Thought content normal          Judgment: Judgment normal           Tristan Freed MD  [address]

## 2021-08-25 ENCOUNTER — LAB (OUTPATIENT)
Dept: LAB | Facility: HOSPITAL | Age: 51
End: 2021-08-25
Payer: COMMERCIAL

## 2021-08-25 ENCOUNTER — HOSPITAL ENCOUNTER (OUTPATIENT)
Dept: RADIOLOGY | Facility: HOSPITAL | Age: 51
Discharge: HOME | End: 2021-08-25
Payer: COMMERCIAL

## 2021-08-25 DIAGNOSIS — M25.50 POLYARTHRALGIA: ICD-10-CM

## 2021-08-25 DIAGNOSIS — G89.29 CHRONIC PAIN OF RIGHT ANKLE: ICD-10-CM

## 2021-08-25 DIAGNOSIS — M25.559 HIP PAIN: ICD-10-CM

## 2021-08-25 DIAGNOSIS — M25.571 CHRONIC PAIN OF RIGHT ANKLE: ICD-10-CM

## 2021-08-25 LAB
ALBUMIN SERPL BCP-MCNC: 3.4 G/DL (ref 3.5–5)
ALP SERPL-CCNC: 64 U/L (ref 46–116)
ALT SERPL W P-5'-P-CCNC: 45 U/L (ref 12–78)
ANION GAP SERPL CALCULATED.3IONS-SCNC: 1 MMOL/L (ref 4–13)
AST SERPL W P-5'-P-CCNC: 21 U/L (ref 5–45)
BILIRUB SERPL-MCNC: 0.68 MG/DL (ref 0.2–1)
BUN SERPL-MCNC: 18 MG/DL (ref 5–25)
CALCIUM ALBUM COR SERPL-MCNC: 9.1 MG/DL (ref 8.3–10.1)
CALCIUM SERPL-MCNC: 8.6 MG/DL (ref 8.3–10.1)
CHLORIDE SERPL-SCNC: 110 MMOL/L (ref 100–108)
CO2 SERPL-SCNC: 27 MMOL/L (ref 21–32)
CREAT SERPL-MCNC: 1.14 MG/DL (ref 0.6–1.3)
CRP SERPL QL: <3 MG/L
ERYTHROCYTE [DISTWIDTH] IN BLOOD BY AUTOMATED COUNT: 12.9 % (ref 11.6–15.1)
GFR SERPL CREATININE-BSD FRML MDRD: 74 ML/MIN/1.73SQ M
GLUCOSE P FAST SERPL-MCNC: 95 MG/DL (ref 65–99)
HCT VFR BLD AUTO: 46.9 % (ref 36.5–49.3)
HGB BLD-MCNC: 15.7 G/DL (ref 12–17)
MCH RBC QN AUTO: 30.1 PG (ref 26.8–34.3)
MCHC RBC AUTO-ENTMCNC: 33.5 G/DL (ref 31.4–37.4)
MCV RBC AUTO: 90 FL (ref 82–98)
PLATELET # BLD AUTO: 176 THOUSANDS/UL (ref 149–390)
PMV BLD AUTO: 11.9 FL (ref 8.9–12.7)
POTASSIUM SERPL-SCNC: 4.3 MMOL/L (ref 3.5–5.3)
PROT SERPL-MCNC: 7.2 G/DL (ref 6.4–8.2)
RBC # BLD AUTO: 5.22 MILLION/UL (ref 3.88–5.62)
SODIUM SERPL-SCNC: 138 MMOL/L (ref 136–145)
URATE SERPL-MCNC: 6.6 MG/DL (ref 4.2–8)
WBC # BLD AUTO: 6.73 THOUSAND/UL (ref 4.31–10.16)

## 2021-08-25 PROCEDURE — 86200 CCP ANTIBODY: CPT

## 2021-08-25 PROCEDURE — 73610 X-RAY EXAM OF ANKLE: CPT

## 2021-08-25 PROCEDURE — 86140 C-REACTIVE PROTEIN: CPT

## 2021-08-25 PROCEDURE — 85027 COMPLETE CBC AUTOMATED: CPT

## 2021-08-25 PROCEDURE — 80053 COMPREHEN METABOLIC PANEL: CPT

## 2021-08-25 PROCEDURE — 86038 ANTINUCLEAR ANTIBODIES: CPT

## 2021-08-25 PROCEDURE — 36415 COLL VENOUS BLD VENIPUNCTURE: CPT

## 2021-08-25 PROCEDURE — 84550 ASSAY OF BLOOD/URIC ACID: CPT

## 2021-08-25 PROCEDURE — 86618 LYME DISEASE ANTIBODY: CPT

## 2021-08-25 PROCEDURE — 73502 X-RAY EXAM HIP UNI 2-3 VIEWS: CPT

## 2021-08-25 PROCEDURE — 86430 RHEUMATOID FACTOR TEST QUAL: CPT

## 2021-08-26 LAB
B BURGDOR IGG+IGM SER-ACNC: 11
RHEUMATOID FACT SER QL LA: NEGATIVE

## 2021-08-27 LAB
CCP AB SER IA-ACNC: 0.8
RYE IGE QN: NEGATIVE

## 2021-09-19 ENCOUNTER — OFFICE VISIT (OUTPATIENT)
Dept: URGENT CARE | Facility: CLINIC | Age: 51
End: 2021-09-19
Payer: COMMERCIAL

## 2021-09-19 ENCOUNTER — APPOINTMENT (OUTPATIENT)
Dept: RADIOLOGY | Facility: CLINIC | Age: 51
End: 2021-09-19
Payer: COMMERCIAL

## 2021-09-19 VITALS
BODY MASS INDEX: 33.86 KG/M2 | RESPIRATION RATE: 16 BRPM | HEART RATE: 76 BPM | WEIGHT: 250 LBS | HEIGHT: 72 IN | OXYGEN SATURATION: 98 % | TEMPERATURE: 96.5 F

## 2021-09-19 DIAGNOSIS — R05.9 COUGH: ICD-10-CM

## 2021-09-19 DIAGNOSIS — R07.89 CHEST TIGHTNESS: ICD-10-CM

## 2021-09-19 DIAGNOSIS — J20.9 ACUTE BRONCHITIS, UNSPECIFIED ORGANISM: Primary | ICD-10-CM

## 2021-09-19 PROCEDURE — G0382 LEV 3 HOSP TYPE B ED VISIT: HCPCS | Performed by: PHYSICIAN ASSISTANT

## 2021-09-19 PROCEDURE — 71046 X-RAY EXAM CHEST 2 VIEWS: CPT

## 2021-09-19 RX ORDER — PREDNISONE 20 MG/1
40 TABLET ORAL DAILY
Qty: 10 TABLET | Refills: 0 | Status: SHIPPED | OUTPATIENT
Start: 2021-09-19 | End: 2021-09-24

## 2021-09-19 RX ORDER — AZITHROMYCIN 250 MG/1
TABLET, FILM COATED ORAL
Qty: 6 TABLET | Refills: 0 | Status: SHIPPED | OUTPATIENT
Start: 2021-09-19 | End: 2021-09-23

## 2021-09-24 ENCOUNTER — TELEPHONE (OUTPATIENT)
Dept: FAMILY MEDICINE CLINIC | Facility: HOSPITAL | Age: 51
End: 2021-09-24

## 2021-09-24 NOTE — TELEPHONE ENCOUNTER
Patient was notified by his employer that he was in contact with a covid positive co worker  The exposure was 4-5 days ago, the patient does not have any symptoms but his employer is requiring him to be tested in order to return to work  Please call patient to advise

## 2021-09-24 NOTE — TELEPHONE ENCOUNTER
Order for COVID testing placed and is in [address] - can go today or tomorrow for most accurate testing

## 2021-09-25 PROCEDURE — U0005 INFEC AGEN DETEC AMPLI PROBE: HCPCS | Performed by: INTERNAL MEDICINE

## 2021-09-25 PROCEDURE — U0003 INFECTIOUS AGENT DETECTION BY NUCLEIC ACID (DNA OR RNA); SEVERE ACUTE RESPIRATORY SYNDROME CORONAVIRUS 2 (SARS-COV-2) (CORONAVIRUS DISEASE [COVID-19]), AMPLIFIED PROBE TECHNIQUE, MAKING USE OF HIGH THROUGHPUT TECHNOLOGIES AS DESCRIBED BY CMS-2020-01-R: HCPCS | Performed by: INTERNAL MEDICINE

## 2022-06-04 ENCOUNTER — OFFICE VISIT (OUTPATIENT)
Dept: URGENT CARE | Facility: CLINIC | Age: 52
End: 2022-06-04
Payer: COMMERCIAL

## 2022-06-04 VITALS
RESPIRATION RATE: 20 BRPM | TEMPERATURE: 98.8 F | OXYGEN SATURATION: 96 % | BODY MASS INDEX: 34.4 KG/M2 | DIASTOLIC BLOOD PRESSURE: 70 MMHG | SYSTOLIC BLOOD PRESSURE: 117 MMHG | HEIGHT: 72 IN | HEART RATE: 96 BPM | WEIGHT: 254 LBS

## 2022-06-04 DIAGNOSIS — B34.9 VIRAL ILLNESS: Primary | ICD-10-CM

## 2022-06-04 LAB
SARS-COV-2 AG UPPER RESP QL IA: NEGATIVE
VALID CONTROL: NORMAL

## 2022-06-04 PROCEDURE — 99213 OFFICE O/P EST LOW 20 MIN: CPT | Performed by: PHYSICIAN ASSISTANT

## 2022-06-04 PROCEDURE — U0003 INFECTIOUS AGENT DETECTION BY NUCLEIC ACID (DNA OR RNA); SEVERE ACUTE RESPIRATORY SYNDROME CORONAVIRUS 2 (SARS-COV-2) (CORONAVIRUS DISEASE [COVID-19]), AMPLIFIED PROBE TECHNIQUE, MAKING USE OF HIGH THROUGHPUT TECHNOLOGIES AS DESCRIBED BY CMS-2020-01-R: HCPCS | Performed by: PHYSICIAN ASSISTANT

## 2022-06-04 PROCEDURE — U0005 INFEC AGEN DETEC AMPLI PROBE: HCPCS | Performed by: PHYSICIAN ASSISTANT

## 2022-06-04 PROCEDURE — 87811 SARS-COV-2 COVID19 W/OPTIC: CPT | Performed by: PHYSICIAN ASSISTANT

## 2022-06-04 RX ORDER — MULTIVIT-MIN/IRON FUM/FOLIC AC 7.5 MG-4
1 TABLET ORAL DAILY
COMMUNITY

## 2022-06-04 NOTE — PROGRESS NOTES
[address] [facility] [address] [facility]        NAME: Adam Xiao is a 47 y o  male  : 1970    MRN: [id number]  DATE: 2022  TIME: 3:42 PM    Assessment and Plan   Viral illness [B34 9]  1  Viral illness  Poct Covid 19 Rapid Antigen Test    COVID Only -Office Collect         Patient Instructions     Motrin or Tylenol as needed for headaches fevers aches and pains  Follow up with PCP in 3-5 days  Proceed to  ER if symptoms worsen  Chief Complaint     Chief Complaint   Patient presents with    Headache     Headache, neck pain, low grade fever, taking motrin, tired         History of Present Illness       74-year-old male presents with headache fatigue body aches  Symptoms started yesterday has had a more  Denies any fevers chills treatment chest and shortness breast cough  Denies any known pain or sore throat  The fat was closed to [facility] had COVID is    Generalized Body Aches  The current episode started yesterday  The problem occurs constantly  The problem has been gradually worsening since onset  The pain is mild  Nothing aggravates the symptoms  Associated symptoms include headaches, fatigue and muscle aches  Pertinent negatives include no congestion, decreased vision, photophobia, rhinorrhea, sore throat, fever, chest pain, coughing, shortness of breath, wheezing, abdominal pain, diarrhea, nausea, vomiting or neck pain  Past treatments include nothing  The treatment provided no relief  Urine output has been normal  There were sick contacts at work  Review of Systems   Review of Systems   Constitutional: Positive for fatigue  Negative for fever  HENT: Negative for congestion, rhinorrhea and sore throat  Eyes: Negative for photophobia  Respiratory: Negative for cough, shortness of breath and wheezing  Cardiovascular: Negative for chest pain  Gastrointestinal: Negative for abdominal pain, diarrhea, nausea and vomiting  Musculoskeletal: Negative for neck pain     Neurological: Positive for headaches  Current Medications       Current Outpatient Medications:     acetaminophen (TYLENOL) 325 mg tablet, Take 650 mg by mouth every 6 (six) hours as needed for mild pain, Disp: , Rfl:     Bioflavonoid Products (BIOFLEX PO), Take by mouth, Disp: , Rfl:     cetirizine (ZyrTEC) 10 mg tablet, Take 10 mg by mouth daily, Disp: , Rfl:     Multiple Vitamins-Minerals (multivitamin with minerals) tablet, Take 1 tablet by mouth daily, Disp: , Rfl:     albuterol (Ventolin HFA) 90 mcg/act inhaler, Inhale 2 puffs every 6 (six) hours as needed for wheezing (Patient not taking: Reported on 6/4/2022), Disp: 18 g, Rfl: 0    fluticasone (FLONASE) 50 mcg/act nasal spray, Use 2 sprays in each nostril at bedtime (Patient not taking: Reported on 6/4/2022), Disp: 1 Bottle, Rfl: 1    Current Allergies     Allergies as of 06/04/2022 - Reviewed 06/04/2022   Allergen Reaction Noted    Chantix [varenicline] Swelling 10/14/2017            The following portions of the patient's history were reviewed and updated as appropriate: allergies, current medications, past family history, past medical history, past social history, past surgical history and problem list      Past Medical History:   Diagnosis Date    Allergic     Arthritis     right ankle, knee, hip    Seasonal allergies        Past Surgical History:   Procedure Laterality Date    KNEE ARTHROSCOPY Bilateral     ROTATOR CUFF REPAIR Right     labrum, rotator cuff and bicep tendon repairs    SHOULDER SURGERY Right 2014       Family History   Problem Relation Age of Onset    Alcohol abuse Mother         denies    Substance Abuse Mother         denies    Mental illness Mother         denies    Colon polyps Neg Hx     Colon cancer Neg Hx          Medications have been verified          Objective   /70   Pulse 96   Temp 98 8 °F (37 1 °C)   Resp 20   Ht 6' (1 829 m)   Wt 115 kg (254 lb)   SpO2 96%   BMI 34 45 kg/m²   No LMP for male patient  Physical Exam     Physical Exam  Vitals and nursing note reviewed  Constitutional:       General: He is not in acute distress  Appearance: Normal appearance  He is well-developed  HENT:      Head: Normocephalic and atraumatic  Right Ear: Hearing, tympanic membrane, ear canal and external ear normal  There is no impacted cerumen  Left Ear: Hearing, tympanic membrane, ear canal and external ear normal  There is no impacted cerumen  Nose: Nose normal       Mouth/Throat:      Pharynx: Uvula midline  No oropharyngeal exudate  Eyes:      General:         Right eye: No discharge  Left eye: No discharge  Conjunctiva/sclera: Conjunctivae normal    Cardiovascular:      Rate and Rhythm: Normal rate and regular rhythm  Heart sounds: Normal heart sounds  No murmur heard  Pulmonary:      Effort: Pulmonary effort is normal  No respiratory distress  Breath sounds: Normal breath sounds  No wheezing or rales  Abdominal:      General: Bowel sounds are normal       Palpations: Abdomen is soft  Tenderness: There is no abdominal tenderness  Musculoskeletal:         General: Normal range of motion  Cervical back: Normal range of motion and neck supple  Lymphadenopathy:      Cervical: No cervical adenopathy  Skin:     General: Skin is warm and dry  Neurological:      Mental Status: He is alert and oriented to person, place, and time     Psychiatric:         Mood and Affect: Mood normal

## 2022-06-04 NOTE — PATIENT INSTRUCTIONS
Motrin or Tylenol as needed for headaches fevers aches and pains  Follow up with PCP in 3-5 days  Proceed to  ER if symptoms worsen  Cold Symptoms   AMBULATORY CARE:   Cold symptoms  include sneezing, dry throat, a stuffy nose, headache, watery eyes, and a cough  Your cough may be dry, or you may cough up mucus  You may also have muscle aches, joint pain, and tiredness  Rarely, you may have a fever  Cold symptoms occur from inflammation in your upper respiratory system caused by a virus  Most colds go away without treatment  Seek care immediately if:   You have increased tiredness and weakness  You are unable to eat  Your heart is beating much faster than usual for you  You see white spots in the back of your throat and your neck is swollen and sore to the touch  You see pinpoint or larger reddish-purple dots on your skin  Contact your healthcare provider if:   You have a fever higher than 102°F (38 9°C)  You have new or worsening shortness of breath  You have thick nasal drainage for more than 2 days  Your symptoms do not improve or get worse within 5 days  You have questions or concerns about your condition or care  Treatment for cold symptoms  may include NSAIDS to decrease muscle aches and fever  Cold medicines may also be given to decrease coughing, nasal stuffiness, sneezing, and a runny nose  Manage your cold symptoms: The following may help relieve cold symptoms, such as a dry throat and congestion:  Gargle with mouthwash or warm salt water as directed  Suck on throat lozenges or hard candy  Use a cold or warm vaporizer or humidifier to ease your breathing  Rest for at least 2 days and then as needed to decrease tiredness and weakness  Use petroleum based jelly around your nostrils to decrease irritation from blowing your nose  Drink plenty of liquids  Liquids will help thin and loosen thick mucus so you can cough it up   Liquids will also keep you hydrated  Ask your healthcare provider which liquids are best for you and how much to drink each day  Prevent the spread of germs  by washing your hands often  You can spread your cold germs to others for at least 3 days after your symptoms start  Do not share items, such as eating utensils  Cover your nose and mouth when you cough or sneeze using the crook of your elbow instead of your hands  Throw used tissues in the garbage  Do not smoke:  Smoking may worsen your symptoms and increase the length of time you feel sick  Talk with your healthcare provider if you need help to stop smoking  Follow up with your doctor as directed:  Write down your questions so you remember to ask them during your visits  © Copyright [employer] 2022 Information is for End User's use only and may not be sold, redistributed or otherwise used for commercial purposes  All illustrations and images included in CareNotes® are the copyrighted property of [employer] D [employer] M , Inc  or [employer]   The above information is an  only  It is not intended as medical advice for individual conditions or treatments  Talk to your doctor, nurse or pharmacist before following any medical regimen to see if it is safe and effective for you  COVID-19 (Coronavirus Disease 2019)   AMBULATORY CARE:   What you need to know about COVID-19:  COVID-19 is the disease caused by a coronavirus first discovered in December 2019  Coronaviruses generally cause upper respiratory (nose, throat, and lung) infections, such as a cold  The 2019 virus spreads quickly and easily  It can be spread starting 2 to 3 days before symptoms even begin  What you need to know about variants: The virus has changed into several new forms (called variants) since it was discovered  The variants may be more contagious (easily spread) than the original form  Some may also cause more severe illness than others  Signs and symptoms of COVID-19  may not develop   Signs and symptoms usually start about 5 days after infection but can take 2 to 14 days  You may feel like you have the flu or a bad cold  Some signs and symptoms go away in a few days  Others can last weeks, months, or possibly years  You may have any of the following:  A cough    Shortness of breath or trouble breathing that may become severe    A fever    Chills that might include shaking    Muscle pain, body aches, or a headache    A sore throat    Sudden changes or loss of your taste or smell    Feeling mentally and physically tired (fatigue)    Congestion (stuffy head and nose), or a runny nose    Diarrhea, nausea, or vomiting    Call your local emergency number (911 in the [facility]) if:   You have trouble breathing or shortness of breath at rest     You have chest pain or pressure that lasts longer than 5 minutes  You become confused or hard to wake  Your lips or face are blue  Seek care immediately if:   You have a fever of 104°F (40°C) or higher  Call your doctor if:   You have symptoms of COVID-19  You have questions or concerns about your condition or care  How COVID-19 is diagnosed:  Testing is offered at many sites  You may need to quarantine until you get your results  Any of the following tests may be used:  A viral PCR test  shows if you have a current infection  A sample is taken from your nose or throat with a swab  You may need to wait 1 or more days to get the test results  An antigen test  shows if you have a protein from the COVID-19 virus  This test is often called a rapid test because the results can be available in 30 minutes or less  An antibody test  shows if you had a recent or past infection  Blood samples are used for this test  Antibodies are made by your immune system to fight the virus that causes COVID-19  Antibodies form 1 to 3 weeks after you are infected  This test is not used to show if you are immune to the virus      A CT, MRI, ultrasound, or x-ray  may be used to check for complications of [id number]-05  These may include pneumonia, blood clots, or other complications  Treatment:   Mild symptoms  may get better on their own  Some treatments have emergency use authorization (EUA)  Examples include monoclonal antibodies and convalescent plasma  These may be given to help prevent worsening of your symptoms  You may also need any of the following:    Decongestants  help reduce nasal congestion and help you breathe more easily  If you take decongestant pills, they may make you feel restless or cause problems with your sleep  Do not use decongestant sprays for more than a few days  Cough suppressants  help reduce coughing  Ask your healthcare provider which type of cough medicine is best for you  To soothe a sore throat,  gargle with warm salt water, or use throat lozenges or a throat spray  Drink more liquids to thin and loosen mucus and to prevent dehydration  NSAIDs or acetaminophen  can help lower a fever and relieve body aches or a headache  Follow directions  If not taken correctly, NSAIDs can cause kidney damage and acetaminophen can cause liver damage  Severe or life-threatening symptoms  are treated in the hospital  You may need any of the following:     Medicines  may be given to fight the virus or treat inflammation  Blood thinners  help prevent or treat blood clots  If you have a deep vein thrombosis (DVT) or pulmonary embolism (PE), you may need to use blood thinners for at least 3 months  Extra oxygen  may be given if you have respiratory failure  This means your lungs cannot get enough oxygen into your blood and out to your organs  A ventilator  may be used to help you breathe  What you need to know about health problems the virus may cause: You may develop long-term health problems caused by the virus  Your risk is higher if you are 65 or older   A weak immune system, obesity, diabetes, chronic kidney disease, or a heart or lung condition can also increase your risk  Your risk is also higher if you are a current or former cigarette smoker  COVID-19 can lead to any of the following:  Multisymptom inflammatory syndrome in adults (MIS-A) or in children (MIS-C), causing inflammation in the heart, digestive system, skin, or brain    Shortness of breath, serious lower respiratory conditions, such as pneumonia or acute respiratory distress syndrome (ARDS)    Blood clots or blood vessel damage    Organ damage from a lack of oxygen or from blood clots    Sleep problems    Problems thinking clearly, remembering information, or concentrating    Mood changes, depression, or anxiety    Long-term problems tasting or smelling    Loss of appetite and weight loss    Nerve pain    Fatigue (feeling mentally and physically tired)    What you need to know about COVID-19 vaccines:  Healthcare providers recommend a COVID-19 vaccine, even if you have already had COVID-19  You are considered fully vaccinated against COVID-19 two weeks after the final dose of any COVID-19 vaccine  Let your healthcare provider know when you have received the final dose of the vaccine  Make a copy of your vaccination card  Keep the original with you in case you need to show it  Keep the copy in a safe place  COVID-19 vaccines are given as a shot in 1 or 2 doses  Vaccination is recommended for everyone 5 years or older  One 2-dose vaccine is fully approved  for those 16 years or older  This vaccine also has an emergency use authorization (EUA) for children 11to 13years old  No vaccine is currently available for children younger than 5 years  A booster (additional) dose  is given to help the immune system continue to protect against severe COVID-19  A booster is recommended for all adults 18 or older  The booster can be a different brand of the COVID-19 vaccine than you originally received  The timing for the booster depends on the type of vaccine you received:    1-dose vaccine:   The booster is given at least 2 months after you received the vaccine  2-dose vaccine: The booster is given at least 5 or 6 months after the second dose  A booster can be given to adolescents 15to 16years old  Only 1 COVID-19 vaccine has this EUA  The booster is given at least 5 months after the second dose of the original vaccine series  A booster is recommended for immunocompromised children 11to 6years old  Only 1 COVID-19 vaccine has this EUA  The booster is given 28 days after the second dose  Continue social distancing and other measures, even after you get the vaccine  Although it is not common, you can become infected after you get the vaccine  You may also be able to pass the virus to others without knowing you are infected  After you get the vaccine, check local, national, and international travel rules  You may need to be tested before you travel  Some countries require proof of a negative test before you travel  You may also need to quarantine after you return  Medicine may be given to prevent infection  The medicine can be given if you are at high risk for infection and cannot get the vaccine  It can also be given if your immune system does not respond well to the vaccine  How the 2019 coronavirus spreads:   Droplets are the main way all coronaviruses spread  The virus travels in droplets that form when a person talks, sings, coughs, or sneezes  The droplets can also float in the air for minutes or hours  Infection happens when you breathe in the droplets or get them in your eyes or nose  Close personal contact with an infected person increases your risk for infection  This means being within 6 feet (2 meters) of the person for at least 15 minutes over 24 hours  Person-to-person contact can spread the virus  For example, a person with the virus on his or her hands can spread it by shaking hands with someone      The virus can stay on objects and surfaces for up to 3 days   You may become infected by touching the object or surface and then touching your eyes or mouth  Help lower the risk for COVID-19:   Wash your hands often throughout the day  Use soap and water  Rub your soapy hands together, lacing your fingers, for at least 20 seconds  Rinse with warm, running water  Dry your hands with a clean towel or paper towel  Use hand  that contains alcohol if soap and water are not available  Teach children how to wash their hands and use hand   Cover sneezes and coughs  Turn your face away and cover your mouth and nose with a tissue  Throw the tissue away  Use the bend of your arm if a tissue is not available  Then wash your hands well with soap and water or use hand   Teach children how to cover a cough or sneeze  Wear a face covering (mask) when needed  Use a cloth covering with at least 2 layers  You can also create layers by putting a cloth covering over a disposable non-medical mask  Cover your mouth and your nose  Follow worldwide, national, and local social distancing guidelines  Keep at least 6 feet (2 meters) between you and others  Try not to touch your face  If you get the virus on your hands, you can transfer it to your eyes, nose, or mouth and become infected  You can also transfer it to objects, surfaces, or people  Clean and disinfect high-touch surfaces and objects often  Use disinfecting wipes, or make a solution of 4 teaspoons of bleach in 1 quart (4 cups) of water  Ask about other vaccines you may need  Get the influenza (flu) vaccine as soon as recommended each year, usually starting in September or October  Get the pneumonia vaccine if recommended  Your healthcare provider can tell you if you should also get other vaccines, and when to get them  Follow social distancing guidelines:  National and local social distancing rules vary   Rules and restrictions may change over time as restrictions are lifted  The following are general guidelines:  Stay home if you are sick or think you may have COVID-19  It is important to stay home if you are waiting for a testing appointment or for test results  Avoid close physical contact with anyone who does not live in your home  Do not shake hands with, hug, or kiss a person as a greeting  If you must use public transportation (such as a bus or subway), try to sit or stand away from others  Wear your face covering  Avoid in-person gatherings and crowds  Attend virtually if possible  Follow up with your doctor as directed:  Write down your questions so you remember to ask them during your visits  For more information:   [facility]  [address] , [address]  Phone: 6- 251 - [phone]  Web Address: [employer] com br    © Copyright [employer] | [employer] 2022 Information is for End User's use only and may not be sold, redistributed or otherwise used for commercial purposes  All illustrations and images included in CareNotes® are the copyrighted property of [employer] M , Inc  or [facility] Vargas   The above information is an  only  It is not intended as medical advice for individual conditions or treatments  Talk to your doctor, nurse or pharmacist before following any medical regimen to see if it is safe and effective for you

## 2022-06-05 LAB — SARS-COV-2 RNA RESP QL NAA+PROBE: NEGATIVE

## 2022-06-07 ENCOUNTER — LAB (OUTPATIENT)
Dept: LAB | Facility: HOSPITAL | Age: 52
End: 2022-06-07

## 2022-06-07 DIAGNOSIS — Z00.8 HEALTH EXAMINATION IN POPULATION SURVEY: ICD-10-CM

## 2022-06-07 LAB
CHOLEST SERPL-MCNC: 173 MG/DL
EST. AVERAGE GLUCOSE BLD GHB EST-MCNC: 105 MG/DL
HBA1C MFR BLD: 5.3 %
HDLC SERPL-MCNC: 33 MG/DL
LDLC SERPL CALC-MCNC: 122 MG/DL (ref 0–100)
NONHDLC SERPL-MCNC: 140 MG/DL
TRIGL SERPL-MCNC: 89 MG/DL

## 2022-06-07 PROCEDURE — 80061 LIPID PANEL: CPT

## 2022-06-07 PROCEDURE — 36415 COLL VENOUS BLD VENIPUNCTURE: CPT

## 2022-06-07 PROCEDURE — 83036 HEMOGLOBIN GLYCOSYLATED A1C: CPT

## 2022-11-08 ENCOUNTER — OFFICE VISIT (OUTPATIENT)
Dept: FAMILY MEDICINE CLINIC | Facility: HOSPITAL | Age: 52
End: 2022-11-08

## 2022-11-08 VITALS
TEMPERATURE: 97.9 F | SYSTOLIC BLOOD PRESSURE: 124 MMHG | HEIGHT: 72 IN | DIASTOLIC BLOOD PRESSURE: 72 MMHG | OXYGEN SATURATION: 98 % | HEART RATE: 71 BPM | BODY MASS INDEX: 33.59 KG/M2 | WEIGHT: 248 LBS

## 2022-11-08 DIAGNOSIS — M25.562 ACUTE PAIN OF LEFT KNEE: Primary | ICD-10-CM

## 2022-11-08 RX ORDER — PREDNISONE 10 MG/1
TABLET ORAL
Qty: 16 TABLET | Refills: 0 | Status: SHIPPED | OUTPATIENT
Start: 2022-11-08

## 2022-11-08 NOTE — PROGRESS NOTES
Name: Jackeline Medina      : 1970      MRN: [id number]  Encounter Provider: SYLVIA Ardon  Encounter Date: 2022   Encounter department: [facility]  Acute pain of left knee  Comments:  pt agreeable to steroid taper & will continue ice/heat compresses, consult ortho for further eval if sx's persist/worsen  Orders:  -     Ambulatory Referral to Orthopedic Surgery; Future  -     predniSONE 10 mg tablet; Take 4 tablets today then 3 tablets for 2 days, 2 tablets for 2 days, and 1 tablet for 2 days           Subjective        Severe pain in left knee for past week  Started after climbing a tree stand but did not injure himself at that time  Feels similar to calcium deposit he had in shoulder years ago  Resolved w/cortisone injections  Takes tylenol daily for right knee DJD w/o relief in left knee  Review of Systems   Musculoskeletal: Positive for arthralgias (left knee), gait problem and joint swelling (left knee)  Current Outpatient Medications on File Prior to Visit   Medication Sig   • acetaminophen (TYLENOL) 325 mg tablet Take 650 mg by mouth every 6 (six) hours as needed for mild pain   • albuterol (Ventolin HFA) 90 mcg/act inhaler Inhale 2 puffs every 6 (six) hours as needed for wheezing (Patient not taking: No sig reported)   • Bioflavonoid Products (BIOFLEX PO) Take by mouth   • cetirizine (ZyrTEC) 10 mg tablet Take 10 mg by mouth daily   • fluticasone (FLONASE) 50 mcg/act nasal spray Use 2 sprays in each nostril at bedtime (Patient not taking: No sig reported)   • Multiple Vitamins-Minerals (multivitamin with minerals) tablet Take 1 tablet by mouth daily       Objective     /72   Pulse 71   Temp 97 9 °F (36 6 °C)   Ht 6' (1 829 m)   Wt 112 kg (248 lb)   SpO2 98%   BMI 33 63 kg/m²       Physical Exam  Vitals reviewed  Constitutional:       General: He is not in acute distress       Appearance: Normal appearance  HENT:      Head: Normocephalic  Eyes:      General: No scleral icterus  Pulmonary:      Effort: Pulmonary effort is normal  No respiratory distress  Musculoskeletal:      Left knee: Swelling (mild suprapatellar) present  No effusion, erythema, ecchymosis or crepitus  Decreased range of motion  Tenderness present  Right lower leg: Edema (non-pitting, wearing compression sock) present  Left lower leg: Edema (non-pitting, wearing compression sock) present  Comments: - laxity/weakness/instability   Skin:     General: Skin is warm and dry  Neurological:      General: No focal deficit present  Mental Status: He is alert and oriented to person, place, and time  Gait: Gait abnormal (limping)     Psychiatric:         Mood and Affect: Mood normal          Behavior: Behavior normal           SYLVIA Casas

## 2022-11-09 ENCOUNTER — TELEPHONE (OUTPATIENT)
Dept: FAMILY MEDICINE CLINIC | Facility: HOSPITAL | Age: 52
End: 2022-11-09

## 2022-11-09 ENCOUNTER — OFFICE VISIT (OUTPATIENT)
Dept: OBGYN CLINIC | Facility: CLINIC | Age: 52
End: 2022-11-09

## 2022-11-09 ENCOUNTER — APPOINTMENT (OUTPATIENT)
Dept: RADIOLOGY | Facility: CLINIC | Age: 52
End: 2022-11-09

## 2022-11-09 VITALS
SYSTOLIC BLOOD PRESSURE: 122 MMHG | DIASTOLIC BLOOD PRESSURE: 82 MMHG | BODY MASS INDEX: 34.54 KG/M2 | HEIGHT: 72 IN | WEIGHT: 255 LBS

## 2022-11-09 DIAGNOSIS — M17.12 PRIMARY OSTEOARTHRITIS OF LEFT KNEE: Primary | ICD-10-CM

## 2022-11-09 DIAGNOSIS — M25.562 LEFT KNEE PAIN, UNSPECIFIED CHRONICITY: ICD-10-CM

## 2022-11-09 DIAGNOSIS — M76.52 PATELLAR TENDINITIS OF LEFT KNEE: ICD-10-CM

## 2022-11-09 NOTE — PROGRESS NOTES
Assessment:     1  Primary osteoarthritis of left knee    2  Patellar tendinitis of left knee        Plan:     Problem List Items Addressed This Visit    None     Visit Diagnoses     Primary osteoarthritis of left knee    -  Primary    Relevant Orders    XR knee 4+ vw left injury    Patellar tendinitis of left knee        pt agreeable to steroid taper & will continue ice/heat compresses, consult ortho for further eval if sx's persist/worsen    Relevant Orders    Ambulatory Referral to Physical Therapy    Knee Immobilizer        Findings consistent with severe medial osteoarthritis but primary issue is patella tendonitis  Imaging and prognosis reviewed with patient  Patient will start with physical therapy to rehab knee and given knee immobilizer to wear with ambulation  Patient is to use the knee immobilizer for the next couple weeks  He should remove the immobilizer and move the knee to prevent stiffness  He should only with the immobilizer when ambulating  Avoid kneeling, crawling, deep knee bends  OTC anti inflammatories, voltaren gel for knee pain  See patient back in 6 weeks  All patient's questions were answered to his satisfaction  This note is created using dictation transcription  It may contain typographical errors, grammatical errors, improperly dictated words, background noise and other errors  Subjective:     Patient ID: Werner Austin is a 47 y o  male  Chief Complaint:  47 yr old male in for evaluation of left knee pain  Referred by Dr Silas Ptaton  He states his pain started 2 weeks ago in left knee  He was at [facility] on college trip with son and did a lot of walking on hilly terrain  No specific injury or trauma but next day pain anterior aspect of knee  Pain has persisted and feels to him like sharp stabbing pain  Feels like when he had calcific deposit in shoulder years ago  He has tried lidocaine cream with no effect  No other treatment  Denies locking or instability    Information on patient's intake form was reviewed  Allergy:  Allergies   Allergen Reactions   • Chantix [Varenicline] Swelling     In legs     Medications:  all current active meds have been reviewed  Past Medical History:  Past Medical History:   Diagnosis Date   • Allergic    • Arthritis     right ankle, knee, hip   • Seasonal allergies      Past Surgical History:  Past Surgical History:   Procedure Laterality Date   • KNEE ARTHROSCOPY Bilateral    • ROTATOR CUFF REPAIR Right     labrum, rotator cuff and bicep tendon repairs   • SHOULDER SURGERY Right 2014     Family History:  Family History   Problem Relation Age of Onset   • Alcohol abuse Mother         denies   • Substance Abuse Mother         denies   • Mental illness Mother         denies   • Colon polyps Neg Hx    • Colon cancer Neg Hx      Social History:  Social History     Substance and Sexual Activity   Alcohol Use Yes     Social History     Substance and Sexual Activity   Drug Use No     Social History     Tobacco Use   Smoking Status Former Smoker   • Types: Cigarettes   Smokeless Tobacco Current User   • Types: Snuff     Review of Systems   Constitutional: Negative for chills and fever  HENT: Negative for ear pain and sore throat  Eyes: Negative for pain and visual disturbance  Respiratory: Negative for cough and shortness of breath  Cardiovascular: Negative for chest pain and palpitations  Gastrointestinal: Negative for abdominal pain and vomiting  Genitourinary: Negative for dysuria and hematuria  Musculoskeletal: Positive for arthralgias (Left knee) and gait problem (Antalgic)  Negative for back pain  Skin: Negative for color change and rash  Neurological: Negative for seizures and syncope  Psychiatric/Behavioral: Negative  All other systems reviewed and are negative          Objective:  BP Readings from Last 1 Encounters:   11/09/22 122/82      Wt Readings from Last 1 Encounters:   11/09/22 116 kg (255 lb)      BMI:   Estimated body mass index is 34 58 kg/m² as calculated from the following:    Height as of this encounter: 6' (1 829 m)  Weight as of this encounter: 116 kg (255 lb)  BSA:   Estimated body surface area is 2 37 meters squared as calculated from the following:    Height as of this encounter: 6' (1 829 m)  Weight as of this encounter: 116 kg (255 lb)  Physical Exam  Vitals and nursing note reviewed  Constitutional:       Appearance: Normal appearance  He is well-developed  HENT:      Head: Normocephalic and atraumatic  Right Ear: External ear normal       Left Ear: External ear normal    Eyes:      Extraocular Movements: Extraocular movements intact  Conjunctiva/sclera: Conjunctivae normal    Pulmonary:      Effort: Pulmonary effort is normal    Musculoskeletal:         General: Tenderness (left knee arthralgia ) present  Cervical back: Neck supple  Left knee: No effusion  Instability Tests: Medial Danielle test negative and lateral Danielle test negative  Skin:     General: Skin is warm and dry  Neurological:      Mental Status: He is alert and oriented to person, place, and time  Deep Tendon Reflexes: Reflexes are normal and symmetric  Psychiatric:         Mood and Affect: Mood normal          Behavior: Behavior normal        Left Knee Exam     Muscle Strength   The patient has normal left knee strength  Tenderness   The patient is experiencing tenderness in the patellar tendon and medial joint line      Range of Motion   Extension: normal   Flexion: normal     Tests   Danielle:  Medial - negative Lateral - negative  Varus: negative Valgus: negative  Patellar apprehension: negative    Other   Erythema: absent  Scars: absent  Sensation: normal  Pulse: present  Swelling: none  Effusion: no effusion present    Comments:  Mild crepitation with motion of patella             I have personally reviewed pertinent films in PACS and my interpretation is xr left knee demonstrates tricompartmental osteoarthrits severe medial , osteophytes in all compartments        Scribe Attestation    I,:  Surya Neal am acting as a scribe while in the presence of the attending physician :       I,:  Nandini Veloz MD personally performed the services described in this documentation    as scribed in my presence :

## 2022-11-10 ENCOUNTER — TELEPHONE (OUTPATIENT)
Dept: OBGYN CLINIC | Facility: HOSPITAL | Age: 52
End: 2022-11-10

## 2022-11-10 NOTE — TELEPHONE ENCOUNTER
Caller: Patient    Doctor: Taras Krueger    Reason for call: Patient was given a knee immobilizer yesterday  Patient states that it does not stay in place  Please advise      Call back#: [phone]

## 2022-11-15 ENCOUNTER — EVALUATION (OUTPATIENT)
Dept: PHYSICAL THERAPY | Facility: CLINIC | Age: 52
End: 2022-11-15

## 2022-11-15 DIAGNOSIS — G89.29 CHRONIC PAIN OF LEFT KNEE: ICD-10-CM

## 2022-11-15 DIAGNOSIS — M25.562 CHRONIC PAIN OF LEFT KNEE: ICD-10-CM

## 2022-11-15 DIAGNOSIS — M76.52 PATELLAR TENDINITIS OF LEFT KNEE: Primary | ICD-10-CM

## 2022-11-15 NOTE — PROGRESS NOTES
PT Evaluation     Today's date: 11/15/2022  Patient name: Piotr Gipson  : 1970  MRN: [id number]  Referring provider: Dylan Sanchez MD  Dx:   Encounter Diagnosis     ICD-10-CM    1  Patellar tendinitis of left knee  M76 52 Ambulatory Referral to Physical Therapy    pt agreeable to steroid taper & will continue ice/heat compresses, consult ortho for further eval if sx's persist/worsen   2  Chronic pain of left knee  M25 562     G89 29                   Assessment  Assessment details: Piotr Gipson is a 47 y o  male presenting to outpatient physical therapy at [facility] with complaints of L knee pain   They present with decreased range of motion, decreased strength, limited flexibility, poor postural awareness, poor body mechanics, poor balance, decreased tolerance to activity and decreased functional mobility due to Patellar tendinitis of left knee  (primary encounter diagnosis)  Chronic pain of left knee  Johann Gonzalez would benefit from skilled physical therapy to address noted impairments in order to allow for full functional return to work and ADL-related activities  Thank you for the referral!  Impairments: abnormal muscle firing, abnormal or restricted ROM, activity intolerance, impaired balance, impaired physical strength, lacks appropriate home exercise program, pain with function and poor body mechanics  Barriers to therapy: n/a  Understanding of Dx/Px/POC: excellent  Goals  ST   Independent with HEP in 2 weeks  2  Decrease pain by 50% in 3 wks  3   Increase bilat knee ext AROM to 3 degrees in 3 weeks     LT  Achieve FOTO score of 70/100 in 6 weeks   2   Able to report 75% decreased pain with work activities in 6 weeks      [address] details: RE in 6 weeks    Patient would benefit from: skilled physical therapy  Planned modality interventions: cryotherapy, electrical stimulation/Russian stimulation and thermotherapy: hydrocollator packs  Planned therapy interventions: abdominal trunk stabilization, manual therapy, neuromuscular re-education, therapeutic activities, therapeutic exercise, body mechanics training and home exercise program  Frequency: 2x week  Duration in visits: 12  Duration in weeks: 6  Plan of Care beginning date: 11/15/2022  Plan of Care expiration date: 12/30/2022  Treatment plan discussed with: patient        Subjective Evaluation    History of Present Illness  Mechanism of injury: Pt c/o L knee pain  Xrays show "moderate to severe osteoarthrosis of the patellofemoral and medial compartments"  3 weeks ago he was on a college visit and [address] with his son  The following AM he woke with severe L knee pain  Given a knee immobilizer to be worn until his f/u with ortho in 6 weeks  Instructed to avoid excessive bending and take the immobilizer off for ROM work  He has not used the immobilizer because it continues to slide down the leg  He does wear a different prescription brace for the R knee and reports it is significantly helpful for any R knee symptoms because it offloads the medial compartment  Pain is located inferior and lateral patella  Described as a cold knife pointing into the front of the knee  Rated 6 5/10  Agg with high marching-type of gait, catching himself from a LOB, walking  Eased with lidocaine cream 3x/day  Positive for instability on uneven surfaces, swelling  He works as a lake and pond  and manager  Involves mainly active work  Work is coming to [employer] [facility] off season and it will be more desk work  In his free time he enjoys hunting, spending time with his teenage kids  He would like to be able to ride his mountain bike again    Patient Goals  Patient goals for therapy: decreased edema, decreased pain, improved balance, increased motion, return to work, return to [employer] activities, independence with ADLs/IADLs and increased strength          Objective     Observations     Additional Observation Details  L knee with mild-mod edema; genu varum bilat    Tenderness     Additional Tenderness Details  TTP L patellar tendon, tibial tub, lateral joint line, ITB, adductors, increased tone quad, glutes    Active Range of Motion   Left Knee   Flexion: 93 degrees   Extension: 4 degrees     Right Knee   Flexion: 76 degrees   Extension: 6 degrees     Mobility   Patellar Mobility:   Left Knee   Hypomobile: left medial, left lateral, left superior and left inferior    Strength/Myotome Testing     Left Hip   Normal muscle strength    Right Hip   Normal muscle strength    Left Knee   Normal strength    Right Knee   Normal strength    Functional Assessment        Comments  Gait: antalgic  SLS: 30 sec bilat  Squat: NT             Precautions: L patellar tendonitis and moderate medial compartment OA  Other factors: n/a  Pt goals: walk without pain      HEP:  Access Code: [email or web address]  URL: [email or web address]  Date: 11/15/2022  Prepared by: Heidi Valle    Exercises  · Side Lunge Adductor Stretch - 2-3 sets - 30 sec hold  · Wall Quarter Squat - 2-3 sets - 30 sec hold            Manuals 11/15            Quad, adductor, glute, ITB roller CHAU            Patellar mob CHAU                                      Neuro Re-Ed             SLS             Foam walk             bosu taps                                                                 Ther Ex             bike             Calf stretch             HR             HS stretch +OP             Knee flexion             Groin stretch 30"x2 ea            Wall sit 30"x2            LAQ iso             LAQ manual ecc             CC walk out lat             Sled pull                                       Ther Activity                                       Gait Training                                       Modalities

## 2022-11-21 ENCOUNTER — OFFICE VISIT (OUTPATIENT)
Dept: PHYSICAL THERAPY | Facility: CLINIC | Age: 52
End: 2022-11-21

## 2022-11-21 DIAGNOSIS — M76.52 PATELLAR TENDINITIS OF LEFT KNEE: Primary | ICD-10-CM

## 2022-11-21 DIAGNOSIS — M25.562 CHRONIC PAIN OF LEFT KNEE: ICD-10-CM

## 2022-11-21 DIAGNOSIS — G89.29 CHRONIC PAIN OF LEFT KNEE: ICD-10-CM

## 2022-11-21 NOTE — PROGRESS NOTES
Daily Note     Today's date: 2022  Patient name: Esteban Lindsey  : 1970  MRN: [id number]  Referring provider: Alphonso Milan MD  Dx:   Encounter Diagnosis     ICD-10-CM    1  Patellar tendinitis of left knee  M76 52       2  Chronic pain of left knee  M25 562     G89 29                      Subjective: Pt reports the stretches given at IE have helped a lot  Objective: See treatment diary below      Assessment: Initiated PT POC today  Pt shows restricted ROM on L knee, and minimal patellar mobility  Benefited from distraction and stretching  Tolerated treatment well  Patient demonstrated fatigue post treatment, exhibited good technique with therapeutic exercises and would benefit from continued PT  Plan: Continue per plan of care  Progress treatment as tolerated  Precautions: L patellar tendonitis and moderate medial compartment OA  Other factors: n/a  Pt goals: walk without pain      HEP:  Access Code: [email or web address]  URL: [email or web address]  Date: 11/15/2022  Prepared by: Mynor Beat    Exercises  · Side Lunge Adductor Stretch - 2-3 sets - 30 sec hold  · Wall Quarter Squat - 2-3 sets - 30 sec hold            Manuals 11/15 11/21           Quad, adductor, glute, ITB roller CHAU WE           Patellar mob CHAU WE                                     Neuro Re-Ed             SLS  30"x3           Foam walk             bosu taps                                                                 Ther Ex              bike  np           Calf stretch  30"x5           HR  x20           HS stretch +OP             Knee flexion             Groin stretch 30"x2 ea 30"x2 ea           Wall sit 30"x2 30"x2            LAQ iso  20"x  10           LAQ manual ecc  nv           CC walk out lat             Sled pull                                       Ther Activity                                       Gait Training                                       Modalities

## 2022-11-28 ENCOUNTER — APPOINTMENT (OUTPATIENT)
Dept: PHYSICAL THERAPY | Facility: CLINIC | Age: 52
End: 2022-11-28

## 2022-12-05 ENCOUNTER — OFFICE VISIT (OUTPATIENT)
Dept: PHYSICAL THERAPY | Facility: CLINIC | Age: 52
End: 2022-12-05

## 2022-12-05 DIAGNOSIS — G89.29 CHRONIC PAIN OF LEFT KNEE: ICD-10-CM

## 2022-12-05 DIAGNOSIS — M76.52 PATELLAR TENDINITIS OF LEFT KNEE: Primary | ICD-10-CM

## 2022-12-05 DIAGNOSIS — M25.562 CHRONIC PAIN OF LEFT KNEE: ICD-10-CM

## 2022-12-05 NOTE — PROGRESS NOTES
Daily Note     Today's date: 2022  Patient name: Kurt Ludwig  : 1970  MRN: [id number]  Referring provider: Olga Shields MD  Dx:   Encounter Diagnosis     ICD-10-CM    1  Patellar tendinitis of left knee  M76 52       2  Chronic pain of left knee  M25 562     G89 29                      Subjective: Pt feeling much better  Was walking in the woods and stepped in a hole and heard a pop in his knee and has felt great ever since  Objective: See treatment diary below      Assessment: Pt doing much better  Still very tight LLE but improved overall  Improved tolerance for the TE's but fatigues quickly  Tolerated treatment well  Patient demonstrated fatigue post treatment, exhibited good technique with therapeutic exercises and would benefit from continued PT  Plan: Continue per plan of care  Progress treatment as tolerated  Precautions: L patellar tendonitis and moderate medial compartment OA  Other factors: n/a  Pt goals: walk without pain      HEP:  Access Code: [email or web address]  URL: [email or web address]  Date: 11/15/2022  Prepared by: Will Nichole    Exercises  · Side Lunge Adductor Stretch - 2-3 sets - 30 sec hold  · Wall Quarter Squat - 2-3 sets - 30 sec hold            Manuals 11/15 11/21 12/          Quad, adductor, glute, ITB roller CHAU WE WE          Patellar mob CHAU WE WE                                    Neuro Re-Ed             SLS  30"x3 30"x3          Foam walk             bosu taps                                                                 Ther Ex              bike  np           Calf stretch  30"x5 30"x5          HR  x20 x20          HS stretch +OP             Knee flexion             Groin stretch 30"x2 ea 30"x2 ea 30"x2 ea          Wall sit 30"x2 30"x2            LAQ iso  20"x  10 20"x  10          LAQ manual ecc  nv           CC walk out lat   55#  x5 ea          Sled pull   150#  5 laps          3 way dynamic HS stretches   x10 ea Ther Activity                                       Gait Training                                       Modalities

## 2022-12-08 ENCOUNTER — OFFICE VISIT (OUTPATIENT)
Dept: PHYSICAL THERAPY | Facility: CLINIC | Age: 52
End: 2022-12-08

## 2022-12-08 DIAGNOSIS — M25.562 CHRONIC PAIN OF LEFT KNEE: ICD-10-CM

## 2022-12-08 DIAGNOSIS — G89.29 CHRONIC PAIN OF LEFT KNEE: ICD-10-CM

## 2022-12-08 DIAGNOSIS — M76.52 PATELLAR TENDINITIS OF LEFT KNEE: Primary | ICD-10-CM

## 2022-12-08 NOTE — PROGRESS NOTES
Daily Note / Discharge    Today's date: 2022  Patient name: Vasile Burden  : 1970  MRN: [id number]  Referring provider: Carter Fisher MD  Dx:   Encounter Diagnosis     ICD-10-CM    1  Patellar tendinitis of left knee  M76 52       2  Chronic pain of left knee  M25 562     G89 29                      Subjective: Pt feeling good and feeling confident in transitioning to independent HEP  Objective: See treatment diary below      Assessment:    Pt  has continued to progress and is ready to be d/c to Home Exercise Program today  Pt  now reports less pain at rest and with activity  Pt  now improved to having less difficulty with ADL's due to condition being seen at PT for  ROM on L has improved to [facility]  Discussed TE's given on Home Exercise Program, which pt  showed competency in   Pt  Has met some impairment and functional goals  Plan: d/c to independent HEP     Precautions: L patellar tendonitis and moderate medial compartment OA  Other factors: n/a  Pt goals: walk without pain      HEP:  Access Code: [email or web address]  URL: [email or web address]  Date: 11/15/2022  Prepared by: Margareth Walls    Exercises  · Side Lunge Adductor Stretch - 2-3 sets - 30 sec hold  · Wall Quarter Squat - 2-3 sets - 30 sec hold            Manuals 11/15 11/21 12/5 12/8         Quad, adductor, glute, ITB roller CHAU WE WE WE         Patellar mob CHAU WE WE WE                                   Neuro Re-Ed             SLS  30"x3 30"x3 30"x3         Foam walk             bosu taps                                                                 Ther Ex              bike  np           Calf stretch  30"x5 30"x5 30"x5         HR  x20 x20 x20         SL star balance    3x5         HS stretch +OP             Knee flexion             Groin stretch 30"x2 ea 30"x2 ea 30"x2 ea 30"x3         Wall sit 30"x2 30"x2   30"x3         LAQ iso  20"x  10 20"x  10 5"x20         LAQ manual ecc  nv  5"x10         CC walk out lat 55#  x5 ea          Sled pull   150#  5 laps          3 way dynamic HS stretches   x10 ea          Leg Press    155#  x20         Ther Activity                                       Gait Training                                       Modalities

## 2022-12-12 ENCOUNTER — APPOINTMENT (OUTPATIENT)
Dept: PHYSICAL THERAPY | Facility: CLINIC | Age: 52
End: 2022-12-12

## 2022-12-19 ENCOUNTER — APPOINTMENT (OUTPATIENT)
Dept: PHYSICAL THERAPY | Facility: CLINIC | Age: 52
End: 2022-12-19

## 2022-12-27 ENCOUNTER — APPOINTMENT (OUTPATIENT)
Dept: PHYSICAL THERAPY | Facility: CLINIC | Age: 52
End: 2022-12-27

## 2023-01-12 ENCOUNTER — OFFICE VISIT (OUTPATIENT)
Dept: OBGYN CLINIC | Facility: CLINIC | Age: 53
End: 2023-01-12

## 2023-01-12 VITALS
DIASTOLIC BLOOD PRESSURE: 80 MMHG | BODY MASS INDEX: 34.95 KG/M2 | SYSTOLIC BLOOD PRESSURE: 120 MMHG | HEIGHT: 72 IN | WEIGHT: 258 LBS

## 2023-01-12 DIAGNOSIS — M76.52 PATELLAR TENDINITIS OF LEFT KNEE: ICD-10-CM

## 2023-01-12 DIAGNOSIS — M17.12 PRIMARY OSTEOARTHRITIS OF LEFT KNEE: ICD-10-CM

## 2023-01-12 DIAGNOSIS — M17.31 POST-TRAUMATIC OSTEOARTHRITIS OF RIGHT KNEE: Primary | ICD-10-CM

## 2023-01-12 NOTE — PROGRESS NOTES
Assessment:     1  Post-traumatic osteoarthritis of right knee    2  Primary osteoarthritis of left knee    3  Patellar tendinitis of left knee        Plan:      Problem List Items Addressed This Visit        Musculoskeletal and Integument    Post-traumatic osteoarthritis of right knee - Primary    Relevant Orders    Injection Procedure Prior Authorization    Primary osteoarthritis of left knee    Patellar tendinitis of left knee       Findings consistent with left patellar tendinitis and osteoarthritis of bilateral knees  I am unsure what would cause his sudden reduction of pain in his patella with the symptoms he reported  Continue with OTC NDAIDs  He may be as activize as he can tolerate  We discussed his Right knee and we will order Visco supplements  Advised patient that he will be called to schedule an appointment when these are available  We will continue with conservative treatment vs surgical intervention with knee replacement for his right knee  All patient's questions were answered to her satisfaction  This note is created using dictation transcription  It may contain typographical errors, grammatical errors, improperly dictated words, background noise and other errors  Subjective:     Patient ID: Kendra Brito is a 46 y o  male  Chief Complaint:  Pt states that he has been in Physical Therapy for 4 weeks and his knee has been feeling great  He reports 90% improvement  He reports he was walking in the woods hunting about 1 month ago and he stepped in a small depression  He states he felt a pop and sudden warmth in his patella  Since then he has been feeling great  He states he has been walking a lot and has no pain until the evening , with pain   5/10 reported  He also states that his QOL is limited due to severe Right knee pain   He states he has limited flexion and cannot weight bear for prolonged times due to pain    Allergy:  Allergies   Allergen Reactions   • Chantix [Varenicline] Swelling In legs     Medications:  all current active meds have been reviewed  Past Medical History:  Past Medical History:   Diagnosis Date   • Allergic    • Arthritis     right ankle, knee, hip   • Seasonal allergies      Past Surgical History:  Past Surgical History:   Procedure Laterality Date   • KNEE ARTHROSCOPY Bilateral     debridement   • ROTATOR CUFF REPAIR Right     labrum, rotator cuff and bicep tendon repairs   • SHOULDER SURGERY Right 2014     Family History:  Family History   Problem Relation Age of Onset   • Alcohol abuse Mother         denies   • Substance Abuse Mother         denies   • Mental illness Mother         denies   • Colon polyps Neg Hx    • Colon cancer Neg Hx      Social History:  Social History     Substance and Sexual Activity   Alcohol Use Yes     Social History     Substance and Sexual Activity   Drug Use No     Social History     Tobacco Use   Smoking Status Former   • Types: Cigarettes   Smokeless Tobacco Current   • Types: Snuff     Review of Systems   Constitutional: Negative for chills and fever  HENT: Negative for ear pain and sore throat  Eyes: Negative for pain and visual disturbance  Respiratory: Negative for cough and shortness of breath  Cardiovascular: Negative for chest pain and palpitations  Gastrointestinal: Negative for abdominal pain and vomiting  Genitourinary: Negative for dysuria and hematuria  Musculoskeletal: Positive for arthralgias (Right knee) and gait problem (Antalgic)  Negative for back pain  Skin: Negative for color change and rash  Neurological: Negative for seizures and syncope  Psychiatric/Behavioral: Negative  All other systems reviewed and are negative  Objective:  BP Readings from Last 1 Encounters:   01/12/23 120/80      Wt Readings from Last 1 Encounters:   01/12/23 117 kg (258 lb)      BMI:   Estimated body mass index is 34 99 kg/m² as calculated from the following:    Height as of this encounter: 6' (1 829 m)  Weight as of this encounter: 117 kg (258 lb)  BSA:   Estimated body surface area is 2 37 meters squared as calculated from the following:    Height as of this encounter: 6' (1 829 m)  Weight as of this encounter: 117 kg (258 lb)  Physical Exam  Vitals and nursing note reviewed  Constitutional:       Appearance: Normal appearance  He is well-developed  HENT:      Head: Normocephalic and atraumatic  Right Ear: External ear normal       Left Ear: External ear normal    Eyes:      Extraocular Movements: Extraocular movements intact  Conjunctiva/sclera: Conjunctivae normal    Pulmonary:      Effort: Pulmonary effort is normal    Musculoskeletal:      Cervical back: Neck supple  Right knee: No effusion  Instability Tests: Medial Danielle test positive  Lateral Danielle test negative  Left knee: No effusion  Instability Tests: Medial Danielle test negative and lateral Danielle test negative  Skin:     General: Skin is warm and dry  Neurological:      Mental Status: He is alert and oriented to person, place, and time  Psychiatric:         Mood and Affect: Mood normal          Behavior: Behavior normal        Right Knee Exam     Muscle Strength   The patient has normal right knee strength  Tenderness   The patient is experiencing tenderness in the medial joint line  Range of Motion   The patient has normal right knee ROM  Tests   Danielle:  Medial - positive Lateral - negative  Varus: negative Valgus: negative  Patellar apprehension: negative    Other   Erythema: absent  Sensation: normal  Pulse: present  Swelling: none  Effusion: no effusion present    Comments:  Patellofemoral joint capitation with knee motion      Left Knee Exam     Muscle Strength   The patient has normal left knee strength  Tenderness   The patient is experiencing no tenderness  Range of Motion   The patient has normal left knee ROM      Tests   Danielle:  Medial - negative Lateral - negative  Varus: negative Valgus: negative  Patellar apprehension: negative    Other   Erythema: absent  Sensation: normal  Pulse: present  Swelling: none  Effusion: no effusion present            No new images were reviewed

## 2023-01-25 ENCOUNTER — PROCEDURE VISIT (OUTPATIENT)
Dept: OBGYN CLINIC | Facility: CLINIC | Age: 53
End: 2023-01-25

## 2023-01-25 VITALS
SYSTOLIC BLOOD PRESSURE: 122 MMHG | WEIGHT: 258 LBS | HEIGHT: 72 IN | DIASTOLIC BLOOD PRESSURE: 78 MMHG | BODY MASS INDEX: 34.95 KG/M2

## 2023-01-25 DIAGNOSIS — M17.31 POST-TRAUMATIC OSTEOARTHRITIS OF RIGHT KNEE: Primary | ICD-10-CM

## 2023-01-25 NOTE — ASSESSMENT & PLAN NOTE
Findings consistent with right knee osteoarthritis  Findings and treatment options were discussed with the patient  The first of 3 right knee Orthovisc injections was given today  He tolerated the procedure well  Advised to apply cold compress today  Follow-up in 1 week for the second injection  All questions were answered to patient's satisfaction

## 2023-01-25 NOTE — PROGRESS NOTES
Assessment:     1  Post-traumatic osteoarthritis of right knee        Plan:     Problem List Items Addressed This Visit        Musculoskeletal and Integument    Post-traumatic osteoarthritis of right knee - Primary     Findings consistent with right knee osteoarthritis  Findings and treatment options were discussed with the patient  The first of 3 right knee Orthovisc injections was given today  He tolerated the procedure well  Advised to apply cold compress today  Follow-up in 1 week for the second injection  All questions were answered to patient's satisfaction  Relevant Medications    sodium hyaluronate (ORTHOVISC) injection SOSY 30 mg (Completed)    Other Relevant Orders    Large joint arthrocentesis: R knee (Completed)      Subjective:     Patient ID: Ang Wright is a 46 y o  male  Chief Complaint: This is a 79-year-old white male here for follow-up of right knee posttraumatic osteoarthritis  He is here for the first of 3 right knee Orthovisc injections  He continues to have aching pain in the right knee      Allergy:  Allergies   Allergen Reactions   • Chantix [Varenicline] Swelling     In legs     Medications:  all current active meds have been reviewed  Past Medical History:  Past Medical History:   Diagnosis Date   • Allergic    • Arthritis     right ankle, knee, hip   • Seasonal allergies      Past Surgical History:  Past Surgical History:   Procedure Laterality Date   • KNEE ARTHROSCOPY Bilateral     debridement   • ROTATOR CUFF REPAIR Right     labrum, rotator cuff and bicep tendon repairs   • SHOULDER SURGERY Right 2014     Family History:  Family History   Problem Relation Age of Onset   • Alcohol abuse Mother         denies   • Substance Abuse Mother         denies   • Mental illness Mother         denies   • Colon polyps Neg Hx    • Colon cancer Neg Hx      Social History:  Social History     Substance and Sexual Activity   Alcohol Use Yes     Social History     Substance and Sexual Activity   Drug Use No     Social History     Tobacco Use   Smoking Status Former   • Types: Cigarettes   Smokeless Tobacco Current   • Types: Snuff     Review of Systems   Constitutional: Negative  HENT: Negative  Eyes: Negative  Respiratory: Negative  Cardiovascular: Negative  Gastrointestinal: Negative  Endocrine: Negative  Genitourinary: Negative  Musculoskeletal: Positive for arthralgias  Skin: Negative  Allergic/Immunologic: Negative  Neurological: Negative  Hematological: Negative  Psychiatric/Behavioral: Negative  Objective:  BP Readings from Last 1 Encounters:   01/25/23 122/78      Wt Readings from Last 1 Encounters:   01/25/23 117 kg (258 lb)      BMI:   Estimated body mass index is 34 99 kg/m² as calculated from the following:    Height as of this encounter: 6' (1 829 m)  Weight as of this encounter: 117 kg (258 lb)  BSA:   Estimated body surface area is 2 37 meters squared as calculated from the following:    Height as of this encounter: 6' (1 829 m)  Weight as of this encounter: 117 kg (258 lb)  Physical Exam  Constitutional:       General: He is not in acute distress  Appearance: He is well-developed  HENT:      Head: Normocephalic  Eyes:      Conjunctiva/sclera: Conjunctivae normal       Pupils: Pupils are equal, round, and reactive to light  Pulmonary:      Effort: Pulmonary effort is normal  No respiratory distress  Musculoskeletal:      Right knee: No effusion  Instability Tests: Medial Danielle test positive  Lateral Danielle test negative  Skin:     General: Skin is warm and dry  Neurological:      Mental Status: He is alert and oriented to person, place, and time  Psychiatric:         Behavior: Behavior normal        Right Knee Exam     Tenderness   The patient is experiencing tenderness in the medial joint line  Range of Motion   The patient has normal right knee ROM      Tests   Danielle:  Medial - positive Lateral - negative  Varus: negative Valgus: negative    Other   Erythema: absent  Scars: absent  Sensation: normal  Pulse: present  Swelling: none  Effusion: no effusion present    Comments:  Patellofemoral crepitation            No new imaging  Large joint arthrocentesis: R knee  Universal Protocol:  Consent: Verbal consent obtained    Risks and benefits: risks, benefits and alternatives were discussed  Consent given by: patient  Patient understanding: patient states understanding of the procedure being performed    Supporting Documentation  Indications: pain   Procedure Details  Location: knee - R knee  Preparation: Patient was prepped and draped in the usual sterile fashion  Needle size: 22 G  Approach: anterolateral  Medications administered: 30 mg sodium hyaluronate 30 mg/2 mL    Patient tolerance: patient tolerated the procedure well with no immediate complications  Dressing:  Sterile dressing applied

## 2023-02-01 ENCOUNTER — PROCEDURE VISIT (OUTPATIENT)
Dept: OBGYN CLINIC | Facility: CLINIC | Age: 53
End: 2023-02-01

## 2023-02-01 VITALS
DIASTOLIC BLOOD PRESSURE: 80 MMHG | HEIGHT: 72 IN | WEIGHT: 260 LBS | SYSTOLIC BLOOD PRESSURE: 118 MMHG | BODY MASS INDEX: 35.21 KG/M2

## 2023-02-01 DIAGNOSIS — M17.31 POST-TRAUMATIC OSTEOARTHRITIS OF RIGHT KNEE: Primary | ICD-10-CM

## 2023-02-01 NOTE — PROGRESS NOTES
Assessment:     1  Post-traumatic osteoarthritis of right knee        Plan:     Problem List Items Addressed This Visit        Musculoskeletal and Integument    Post-traumatic osteoarthritis of right knee - Primary     Findings consistent with right knee osteoarthritis  Findings and treatment options were discussed with the patient  The 2nd of 3 right knee Orthovisc injections was given today  He tolerated the procedure well  Advised to apply cold compress today  Follow-up in 1 week for the 3rd injection  All questions were answered to patient's satisfaction  Relevant Medications    sodium hyaluronate (ORTHOVISC) injection SOSY 30 mg (Completed)    Other Relevant Orders    Large joint arthrocentesis: R knee (Completed)      Subjective:     Patient ID: Nichole Mason is a 46 y o  male  Chief Complaint: This is a 58-year-old white male here for follow-up of right knee posttraumatic osteoarthritis  He is here for the 2nd of 3 right knee Orthovisc injections  No issues after the first injection      Allergy:  Allergies   Allergen Reactions   • Chantix [Varenicline] Swelling     In legs     Medications:  all current active meds have been reviewed  Past Medical History:  Past Medical History:   Diagnosis Date   • Allergic    • Arthritis     right ankle, knee, hip   • Seasonal allergies      Past Surgical History:  Past Surgical History:   Procedure Laterality Date   • KNEE ARTHROSCOPY Bilateral     debridement   • ROTATOR CUFF REPAIR Right     labrum, rotator cuff and bicep tendon repairs   • SHOULDER SURGERY Right 2014     Family History:  Family History   Problem Relation Age of Onset   • Alcohol abuse Mother         denies   • Substance Abuse Mother         denies   • Mental illness Mother         denies   • Colon polyps Neg Hx    • Colon cancer Neg Hx      Social History:  Social History     Substance and Sexual Activity   Alcohol Use Yes     Social History     Substance and Sexual Activity Drug Use No     Social History     Tobacco Use   Smoking Status Former   • Types: Cigarettes   Smokeless Tobacco Current   • Types: Snuff     Review of Systems   Constitutional: Negative  HENT: Negative  Eyes: Negative  Respiratory: Negative  Cardiovascular: Negative  Gastrointestinal: Negative  Endocrine: Negative  Genitourinary: Negative  Musculoskeletal: Positive for arthralgias  Skin: Negative  Allergic/Immunologic: Negative  Neurological: Negative  Hematological: Negative  Psychiatric/Behavioral: Negative  Objective:  BP Readings from Last 1 Encounters:   02/01/23 118/80      Wt Readings from Last 1 Encounters:   02/01/23 118 kg (260 lb)      BMI:   Estimated body mass index is 35 26 kg/m² as calculated from the following:    Height as of this encounter: 6' (1 829 m)  Weight as of this encounter: 118 kg (260 lb)  BSA:   Estimated body surface area is 2 38 meters squared as calculated from the following:    Height as of this encounter: 6' (1 829 m)  Weight as of this encounter: 118 kg (260 lb)  Physical Exam  Constitutional:       General: He is not in acute distress  Appearance: He is well-developed  HENT:      Head: Normocephalic  Eyes:      Conjunctiva/sclera: Conjunctivae normal       Pupils: Pupils are equal, round, and reactive to light  Pulmonary:      Effort: Pulmonary effort is normal  No respiratory distress  Musculoskeletal:      Right knee: No effusion  Instability Tests: Medial Danielle test positive  Lateral Danielle test negative  Skin:     General: Skin is warm and dry  Neurological:      Mental Status: He is alert and oriented to person, place, and time  Psychiatric:         Behavior: Behavior normal        Right Knee Exam     Tenderness   The patient is experiencing tenderness in the medial joint line  Range of Motion   The patient has normal right knee ROM      Tests   Danielle:  Medial - positive Lateral - negative  Varus: negative Valgus: negative    Other   Erythema: absent  Scars: absent  Sensation: normal  Pulse: present  Swelling: none  Effusion: no effusion present    Comments:  Patellofemoral crepitation            No new imaging  Large joint arthrocentesis: R knee  Universal Protocol:  Consent: Verbal consent obtained    Risks and benefits: risks, benefits and alternatives were discussed  Consent given by: patient  Patient understanding: patient states understanding of the procedure being performed    Supporting Documentation  Indications: pain   Procedure Details  Location: knee - R knee  Preparation: Patient was prepped and draped in the usual sterile fashion  Needle size: 22 G  Approach: anterolateral  Medications administered: 30 mg sodium hyaluronate 30 mg/2 mL    Patient tolerance: patient tolerated the procedure well with no immediate complications  Dressing:  Sterile dressing applied

## 2023-02-01 NOTE — ASSESSMENT & PLAN NOTE
Findings consistent with right knee osteoarthritis  Findings and treatment options were discussed with the patient  The 2nd of 3 right knee Orthovisc injections was given today  He tolerated the procedure well  Advised to apply cold compress today  Follow-up in 1 week for the 3rd injection  All questions were answered to patient's satisfaction

## 2023-02-09 ENCOUNTER — PROCEDURE VISIT (OUTPATIENT)
Dept: OBGYN CLINIC | Facility: CLINIC | Age: 53
End: 2023-02-09

## 2023-02-09 VITALS
SYSTOLIC BLOOD PRESSURE: 118 MMHG | BODY MASS INDEX: 34.67 KG/M2 | DIASTOLIC BLOOD PRESSURE: 70 MMHG | HEIGHT: 72 IN | WEIGHT: 256 LBS

## 2023-02-09 DIAGNOSIS — M17.31 POST-TRAUMATIC OSTEOARTHRITIS OF RIGHT KNEE: Primary | ICD-10-CM

## 2023-02-09 NOTE — ASSESSMENT & PLAN NOTE
Findings consistent with right knee osteoarthritis  Findings and treatment options were discussed with the patient  The 3rd of 3 right knee Orthovisc injections was given today  He tolerated the procedure well  Advised to apply cold compress today  Follow-up as needed if symptoms return  Advised patient as soon as he can have another series is in 6 months  Discussed that if he does not have relief with the series, the next step in treatment would be a total knee arthroplasty  All questions were answered to patient's satisfaction

## 2023-02-09 NOTE — PROGRESS NOTES
Assessment:     1  Post-traumatic osteoarthritis of right knee        Plan:     Problem List Items Addressed This Visit        Musculoskeletal and Integument    Post-traumatic osteoarthritis of right knee - Primary     Findings consistent with right knee osteoarthritis  Findings and treatment options were discussed with the patient  The 3rd of 3 right knee Orthovisc injections was given today  He tolerated the procedure well  Advised to apply cold compress today  Follow-up as needed if symptoms return  Advised patient as soon as he can have another series is in 6 months  Discussed that if he does not have relief with the series, the next step in treatment would be a total knee arthroplasty  All questions were answered to patient's satisfaction  Relevant Medications    sodium hyaluronate (ORTHOVISC) injection SOSY 30 mg (Completed)    Other Relevant Orders    Large joint arthrocentesis: R knee (Completed)      Subjective:     Patient ID: Aide Raines is a 46 y o  male  Chief Complaint: This is a 58-year-old white male here for follow-up of right knee posttraumatic osteoarthritis  He is here for the 3rd of 3 right knee Orthovisc injections  He had soreness for a day after the second injection      Allergy:  Allergies   Allergen Reactions   • Chantix [Varenicline] Swelling     In legs     Medications:  all current active meds have been reviewed  Past Medical History:  Past Medical History:   Diagnosis Date   • Allergic    • Arthritis     right ankle, knee, hip   • Seasonal allergies      Past Surgical History:  Past Surgical History:   Procedure Laterality Date   • KNEE ARTHROSCOPY Bilateral     debridement   • ROTATOR CUFF REPAIR Right     labrum, rotator cuff and bicep tendon repairs   • SHOULDER SURGERY Right 2014     Family History:  Family History   Problem Relation Age of Onset   • Alcohol abuse Mother         denies   • Substance Abuse Mother         denies   • Mental illness Mother denies   • Colon polyps Neg Hx    • Colon cancer Neg Hx      Social History:  Social History     Substance and Sexual Activity   Alcohol Use Yes     Social History     Substance and Sexual Activity   Drug Use No     Social History     Tobacco Use   Smoking Status Former   • Types: Cigarettes   Smokeless Tobacco Current   • Types: Snuff     Review of Systems   Constitutional: Negative  HENT: Negative  Eyes: Negative  Respiratory: Negative  Cardiovascular: Negative  Gastrointestinal: Negative  Endocrine: Negative  Genitourinary: Negative  Musculoskeletal: Positive for arthralgias  Skin: Negative  Allergic/Immunologic: Negative  Neurological: Negative  Hematological: Negative  Psychiatric/Behavioral: Negative  Objective:  BP Readings from Last 1 Encounters:   02/09/23 118/70      Wt Readings from Last 1 Encounters:   02/09/23 116 kg (256 lb)      BMI:   Estimated body mass index is 34 72 kg/m² as calculated from the following:    Height as of this encounter: 6' (1 829 m)  Weight as of this encounter: 116 kg (256 lb)  BSA:   Estimated body surface area is 2 37 meters squared as calculated from the following:    Height as of this encounter: 6' (1 829 m)  Weight as of this encounter: 116 kg (256 lb)  Physical Exam  Constitutional:       General: He is not in acute distress  Appearance: He is well-developed  HENT:      Head: Normocephalic  Eyes:      Conjunctiva/sclera: Conjunctivae normal       Pupils: Pupils are equal, round, and reactive to light  Pulmonary:      Effort: Pulmonary effort is normal  No respiratory distress  Musculoskeletal:      Right knee: No effusion  Instability Tests: Medial Danielle test positive  Lateral Danielle test negative  Skin:     General: Skin is warm and dry  Neurological:      Mental Status: He is alert and oriented to person, place, and time     Psychiatric:         Behavior: Behavior normal        Right Knee Exam     Tenderness   The patient is experiencing tenderness in the medial joint line  Range of Motion   The patient has normal right knee ROM  Tests   Danielle:  Medial - positive Lateral - negative  Varus: negative Valgus: negative    Other   Erythema: absent  Scars: absent  Sensation: normal  Pulse: present  Swelling: mild  Effusion: no effusion present    Comments:  Patellofemoral crepitation            No new imaging  Large joint arthrocentesis: R knee  Universal Protocol:  Consent: Verbal consent obtained    Risks and benefits: risks, benefits and alternatives were discussed  Consent given by: patient  Patient understanding: patient states understanding of the procedure being performed    Supporting Documentation  Indications: pain   Procedure Details  Location: knee - R knee  Preparation: Patient was prepped and draped in the usual sterile fashion  Needle size: 22 G  Approach: anterolateral  Medications administered: 30 mg sodium hyaluronate 30 mg/2 mL    Patient tolerance: patient tolerated the procedure well with no immediate complications  Dressing:  Sterile dressing applied

## 2023-02-18 ENCOUNTER — OFFICE VISIT (OUTPATIENT)
Dept: URGENT CARE | Facility: CLINIC | Age: 53
End: 2023-02-18

## 2023-02-18 VITALS
SYSTOLIC BLOOD PRESSURE: 126 MMHG | BODY MASS INDEX: 34.54 KG/M2 | HEART RATE: 70 BPM | OXYGEN SATURATION: 99 % | TEMPERATURE: 96.3 F | RESPIRATION RATE: 16 BRPM | HEIGHT: 72 IN | WEIGHT: 255 LBS | DIASTOLIC BLOOD PRESSURE: 76 MMHG

## 2023-02-18 DIAGNOSIS — Z20.822 ENCOUNTER FOR SCREENING LABORATORY TESTING FOR COVID-19 VIRUS: ICD-10-CM

## 2023-02-18 DIAGNOSIS — B34.9 VIRAL SYNDROME: Primary | ICD-10-CM

## 2023-02-18 LAB
SARS-COV-2 AG UPPER RESP QL IA: NEGATIVE
VALID CONTROL: NORMAL

## 2023-02-18 RX ORDER — IBUPROFEN 600 MG/1
600 TABLET ORAL EVERY 6 HOURS PRN
Qty: 30 TABLET | Refills: 0 | Status: SHIPPED | OUTPATIENT
Start: 2023-02-18 | End: 2023-02-20

## 2023-02-18 RX ORDER — BENZONATATE 200 MG/1
200 CAPSULE ORAL 3 TIMES DAILY PRN
Qty: 20 CAPSULE | Refills: 0 | Status: SHIPPED | OUTPATIENT
Start: 2023-02-18 | End: 2023-02-20

## 2023-02-18 RX ORDER — OSELTAMIVIR PHOSPHATE 75 MG/1
75 CAPSULE ORAL EVERY 12 HOURS SCHEDULED
Qty: 10 CAPSULE | Refills: 0 | Status: SHIPPED | OUTPATIENT
Start: 2023-02-18 | End: 2023-02-23

## 2023-02-18 NOTE — PROGRESS NOTES
[facility]        NAME: Jairo Hairston is a 46 y o  male  : 1970    MRN: [id number]  DATE: 2023  TIME: 3:41 PM    Assessment and Plan   Viral syndrome [B34 9]  1  Viral syndrome        2  Encounter for screening laboratory testing for COVID-19 virus  Poct Covid 19 Rapid Antigen Test    benzonatate (TESSALON) 200 MG capsule    ibuprofen (MOTRIN) 600 mg tablet    oseltamivir (TAMIFLU) 75 mg capsule            Patient Instructions       Follow up with PCP as needed  Increase fluids  Chief Complaint     Chief Complaint   Patient presents with   • Cold Like Symptoms     Pt reports yesterday he was outside walking around a [employer]  Last night he developed a cough  Today c/o fatigue, cough, HA and tightness in his breathing  History of Present Illness       Patient awoke this morning with URI symptoms  He also complains of myalgias  No flu shot this year  URI   This is a new problem  The current episode started today  The problem has been gradually worsening  The maximum temperature recorded prior to his arrival was 100 4 - 100 9 F  Associated symptoms include congestion, coughing, headaches, rhinorrhea and sneezing  Pertinent negatives include no abdominal pain, chest pain, diarrhea, dysuria, ear pain, joint pain, joint swelling, nausea, neck pain, plugged ear sensation, rash, sinus pain, sore throat, swollen glands, vomiting or wheezing  He has tried nothing for the symptoms  Review of Systems   Review of Systems   HENT: Positive for congestion, rhinorrhea and sneezing  Negative for ear pain, sinus pain and sore throat  Respiratory: Positive for cough  Negative for wheezing  Cardiovascular: Negative for chest pain  Gastrointestinal: Negative for abdominal pain, diarrhea, nausea and vomiting  Genitourinary: Negative for dysuria  Musculoskeletal: Negative for joint pain and neck pain  Skin: Negative for rash     Neurological: Positive for headaches  All other systems reviewed and are negative          Current Medications       Current Outpatient Medications:   •  benzonatate (TESSALON) 200 MG capsule, Take 1 capsule (200 mg total) by mouth 3 (three) times a day as needed for cough, Disp: 20 capsule, Rfl: 0  •  ibuprofen (MOTRIN) 600 mg tablet, Take 1 tablet (600 mg total) by mouth every 6 (six) hours as needed for mild pain or fever, Disp: 30 tablet, Rfl: 0  •  oseltamivir (TAMIFLU) 75 mg capsule, Take 1 capsule (75 mg total) by mouth every 12 (twelve) hours for 5 days, Disp: 10 capsule, Rfl: 0  •  acetaminophen (TYLENOL) 325 mg tablet, Take 650 mg by mouth every 6 (six) hours as needed for mild pain (Patient not taking: Reported on 2/18/2023), Disp: , Rfl:   •  albuterol (Ventolin HFA) 90 mcg/act inhaler, Inhale 2 puffs every 6 (six) hours as needed for wheezing (Patient not taking: No sig reported), Disp: 18 g, Rfl: 0  •  Bioflavonoid Products (BIOFLEX PO), Take by mouth, Disp: , Rfl:   •  cetirizine (ZyrTEC) 10 mg tablet, Take 10 mg by mouth daily (Patient not taking: Reported on 2/18/2023), Disp: , Rfl:   •  fluticasone (FLONASE) 50 mcg/act nasal spray, Use 2 sprays in each nostril at bedtime (Patient not taking: Reported on 6/4/2022), Disp: 1 Bottle, Rfl: 1  •  Multiple Vitamins-Minerals (multivitamin with minerals) tablet, Take 1 tablet by mouth daily (Patient not taking: Reported on 2/18/2023), Disp: , Rfl:   •  predniSONE 10 mg tablet, Take 4 tablets today then 3 tablets for 2 days, 2 tablets for 2 days, and 1 tablet for 2 days (Patient not taking: Reported on 2/1/2023), Disp: 16 tablet, Rfl: 0    Current Allergies     Allergies as of 02/18/2023 - Reviewed 02/18/2023   Allergen Reaction Noted   • Chantix [varenicline] Swelling 10/14/2017            The following portions of the patient's history were reviewed and updated as appropriate: allergies, current medications, past family history, past medical history, past social history, past surgical history and problem list      Past Medical History:   Diagnosis Date   • Allergic    • Arthritis     right ankle, knee, hip   • Seasonal allergies        Past Surgical History:   Procedure Laterality Date   • KNEE ARTHROSCOPY Bilateral     debridement   • ROTATOR CUFF REPAIR Right     labrum, rotator cuff and bicep tendon repairs   • SHOULDER SURGERY Right 2014       Family History   Problem Relation Age of Onset   • Alcohol abuse Mother         denies   • Substance Abuse Mother         denies   • Mental illness Mother         denies   • Colon polyps Neg Hx    • Colon cancer Neg Hx          Medications have been verified  Objective   /76   Pulse 70   Temp (!) 96 3 °F (35 7 °C)   Resp 16   Ht 6' (1 829 m)   Wt 116 kg (255 lb)   SpO2 99%   BMI 34 58 kg/m²   No LMP for male patient  Physical Exam     Physical Exam  Vitals and nursing note reviewed  Constitutional:       Appearance: Normal appearance  He is normal weight  HENT:      Right Ear: Tympanic membrane, ear canal and external ear normal       Left Ear: Tympanic membrane, ear canal and external ear normal       Nose: Congestion and rhinorrhea present  Mouth/Throat:      Mouth: Mucous membranes are moist       Pharynx: Posterior oropharyngeal erythema present  No oropharyngeal exudate  Eyes:      Conjunctiva/sclera: Conjunctivae normal    Cardiovascular:      Rate and Rhythm: Normal rate and regular rhythm  Pulses: Normal pulses  Heart sounds: Normal heart sounds  Pulmonary:      Effort: Pulmonary effort is normal       Breath sounds: Normal breath sounds  Lymphadenopathy:      Cervical: No cervical adenopathy  Neurological:      Mental Status: He is alert     Psychiatric:         Mood and Affect: Mood normal          Behavior: Behavior normal

## 2023-02-20 ENCOUNTER — OFFICE VISIT (OUTPATIENT)
Dept: FAMILY MEDICINE CLINIC | Facility: HOSPITAL | Age: 53
End: 2023-02-20

## 2023-02-20 VITALS
HEIGHT: 72 IN | OXYGEN SATURATION: 96 % | WEIGHT: 256.4 LBS | TEMPERATURE: 96.7 F | DIASTOLIC BLOOD PRESSURE: 88 MMHG | HEART RATE: 87 BPM | SYSTOLIC BLOOD PRESSURE: 126 MMHG | BODY MASS INDEX: 34.73 KG/M2

## 2023-02-20 DIAGNOSIS — R60.0 BILATERAL LOWER EXTREMITY EDEMA: Primary | ICD-10-CM

## 2023-02-20 DIAGNOSIS — R05.9 COUGH: ICD-10-CM

## 2023-02-20 DIAGNOSIS — M25.562 ACUTE PAIN OF LEFT KNEE: ICD-10-CM

## 2023-02-20 DIAGNOSIS — J30.2 SEASONAL ALLERGIC RHINITIS, UNSPECIFIED TRIGGER: ICD-10-CM

## 2023-02-20 DIAGNOSIS — N52.9 ERECTILE DYSFUNCTION, UNSPECIFIED ERECTILE DYSFUNCTION TYPE: ICD-10-CM

## 2023-02-20 DIAGNOSIS — E78.5 DYSLIPIDEMIA: ICD-10-CM

## 2023-02-20 DIAGNOSIS — E66.09 CLASS 1 OBESITY DUE TO EXCESS CALORIES WITH SERIOUS COMORBIDITY AND BODY MASS INDEX (BMI) OF 34.0 TO 34.9 IN ADULT: ICD-10-CM

## 2023-02-20 PROBLEM — E66.811 CLASS 1 OBESITY WITH SERIOUS COMORBIDITY AND BODY MASS INDEX (BMI) OF 34.0 TO 34.9 IN ADULT: Status: ACTIVE | Noted: 2017-10-15

## 2023-02-20 RX ORDER — FLUTICASONE PROPIONATE 50 MCG
SPRAY, SUSPENSION (ML) NASAL
Start: 2023-02-20

## 2023-02-20 RX ORDER — ACETAMINOPHEN 325 MG/1
650 TABLET ORAL EVERY 6 HOURS PRN
Start: 2023-02-20

## 2023-02-20 NOTE — PROGRESS NOTES
Name: Vasile Burden III      : 1970      MRN: [id number]  Encounter Provider: Flory Jackson DO  Encounter Date: 2023   Encounter department: [address] [place] [address]  203     Assessment & Plan     1  Bilateral lower extremity edema  Comments:  S/sx c/w dependent edema, ECG w/o ischemia, new rx for compression stockings written, urged low sodium diet and avoid NSAIDs, check labs, recheck in 6 wks  Orders:  -     POCT ECG  -     CBC  -     Comprehensive metabolic panel  -     Lipid panel  -     PSA, Total Screen  -     TSH, 3rd generation with Free T4 reflex  -     Compression Stocking    2  Cough  Comments:  using Flonase and OTC antihistamine prn, med list updated  Orders:  -     fluticasone (FLONASE) 50 mcg/act nasal spray; Use 2 sprays in each nostril at bedtime as needed    3  Seasonal allergic rhinitis, unspecified trigger  Comments:  continue antihistamine and Flonase prn - med list updated  Orders:  -     fluticasone (FLONASE) 50 mcg/act nasal spray; Use 2 sprays in each nostril at bedtime as needed    4  Acute pain of left knee  Comments:  Had injections w/some benefit, con't tx and f/u as per Ortho  Orders:  -     acetaminophen (TYLENOL) 325 mg tablet; Take 2 tablets (650 mg total) by mouth every 6 (six) hours as needed for mild pain    5  Dyslipidemia  Comments:  FLP due - BW order given, diet/exercise/wgt loss encouraged, will follow  Orders:  -     CBC  -     Comprehensive metabolic panel  -     Lipid panel  -     PSA, Total Screen  -     TSH, 3rd generation with Free T4 reflex    6  Erectile dysfunction, unspecified erectile dysfunction type  Comments: Will check testosterone and TSH, will follow  Orders:  -     Testosterone; Future    7  Class 1 obesity due to excess calories with serious comorbidity and body mass index (BMI) of 34 0 to 34 9 in adult  Assessment & Plan:  Diet/exercise/wgt loss encouraged      BMI Counseling: Body mass index is 34 77 kg/m²   The BMI is above normal  Nutrition recommendations include decreasing portion sizes, encouraging healthy choices of fruits and vegetables, consuming healthier snacks, moderation in carbohydrate intake, increasing intake of lean protein, reducing intake of saturated and trans fat and reducing intake of cholesterol  Exercise recommendations include exercising 3-5 times per week  No pharmacotherapy was ordered  Rationale for BMI follow-up plan is due to patient being overweight or obese  Depression Screening and Follow-up Plan: Patient was screened for depression during today's encounter  They screened negative with a PHQ-2 score of 0  Colonoscopy 8/20 - 10 yrs    BW 6/22  PSA 8/20      Subjective      HPI Pt here for and acute visit - "when I get home from work my legs are full of fluid"  Pt with chronic intermittent LE edema for years  He states he is here today cause "I am old and I'm taking my health less for granted"  He states his sister has similar issues  He wakes in the am and has no swelling  He notes after being on his feet for 8-10 hrs the swelling occurs  He notes no SOB/orthopnea/PND  He notes no recent CP/palp  He states he "very rarely" adds salt to his food but also admits his diet recently has not been great with holidays and a bunch of birthdays and traveling  He could cut back on some higher sodium foods  He has been taking Tylenol arthritis d/t some knee pain  Wgt up 8 lbs from Nov 22  BMI reviewed  He is less acitve and diet was worse with the holidays  Review of Systems   Constitutional: Negative for chills and fever  HENT: Negative for congestion and trouble swallowing  Eyes: Negative for pain and visual disturbance  Respiratory: Negative for cough, shortness of breath and wheezing  Cardiovascular: Positive for leg swelling  Negative for chest pain and palpitations  Gastrointestinal: Negative for abdominal pain, diarrhea, nausea and vomiting     Genitourinary: Negative for difficulty urinating and dysuria  Musculoskeletal: Positive for arthralgias  Negative for myalgias  Skin: Negative for rash and wound  Neurological: Negative for dizziness and headaches  Hematological: Does not bruise/bleed easily  Psychiatric/Behavioral: Negative for dysphoric mood         Current Outpatient Medications on File Prior to Visit   Medication Sig   • cetirizine (ZyrTEC) 10 mg tablet Take 10 mg by mouth daily as needed for allergies   • oseltamivir (TAMIFLU) 75 mg capsule Take 1 capsule (75 mg total) by mouth every 12 (twelve) hours for 5 days   • [DISCONTINUED] benzonatate (TESSALON) 200 MG capsule Take 1 capsule (200 mg total) by mouth 3 (three) times a day as needed for cough   • [DISCONTINUED] fluticasone (FLONASE) 50 mcg/act nasal spray Use 2 sprays in each nostril at bedtime   • [DISCONTINUED] ibuprofen (MOTRIN) 600 mg tablet Take 1 tablet (600 mg total) by mouth every 6 (six) hours as needed for mild pain or fever   • [DISCONTINUED] acetaminophen (TYLENOL) 325 mg tablet Take 650 mg by mouth every 6 (six) hours as needed for mild pain (Patient not taking: Reported on 2/18/2023)   • [DISCONTINUED] albuterol (Ventolin HFA) 90 mcg/act inhaler Inhale 2 puffs every 6 (six) hours as needed for wheezing (Patient not taking: No sig reported)   • [DISCONTINUED] Bioflavonoid Products (BIOFLEX PO) Take by mouth   • [DISCONTINUED] Multiple Vitamins-Minerals (multivitamin with minerals) tablet Take 1 tablet by mouth daily (Patient not taking: Reported on 2/18/2023)   • [DISCONTINUED] predniSONE 10 mg tablet Take 4 tablets today then 3 tablets for 2 days, 2 tablets for 2 days, and 1 tablet for 2 days (Patient not taking: Reported on 2/1/2023)       Objective     /88 (Patient Position: Sitting, Cuff Size: Large)   Pulse 87   Temp (!) 96 7 °F (35 9 °C) (Tympanic)   Ht 6' (1 829 m)   Wt 116 kg (256 lb 6 4 oz)   SpO2 96%   BMI 34 77 kg/m²     Physical Exam  Vitals and nursing note reviewed  Constitutional:       General: He is not in acute distress  Appearance: He is well-developed  He is obese  He is not ill-appearing  HENT:      Head: Normocephalic and atraumatic  Eyes:      General:         Right eye: No discharge  Left eye: No discharge  Conjunctiva/sclera: Conjunctivae normal    Neck:      Trachea: No tracheal deviation  Cardiovascular:      Rate and Rhythm: Normal rate and regular rhythm  Heart sounds: No murmur heard  Pulmonary:      Effort: Pulmonary effort is normal  No respiratory distress  Breath sounds: Normal breath sounds  No wheezing, rhonchi or rales  Abdominal:      General: There is no distension  Palpations: Abdomen is soft  Tenderness: There is no abdominal tenderness  There is no guarding or rebound  Musculoskeletal:      Cervical back: Neck supple  Right lower leg: No edema  Left lower leg: No edema  Lymphadenopathy:      Cervical: No cervical adenopathy  Skin:     General: Skin is warm and dry  Coloration: Skin is not pale  Findings: No rash  Neurological:      General: No focal deficit present  Mental Status: He is alert  Motor: No abnormal muscle tone  Gait: Gait normal    Psychiatric:         Mood and Affect: Mood normal          Behavior: Behavior normal          Thought Content:  Thought content normal          Judgment: Judgment normal        Refbraydon Velázquez DO

## 2023-02-22 ENCOUNTER — TELEPHONE (OUTPATIENT)
Dept: FAMILY MEDICINE CLINIC | Facility: HOSPITAL | Age: 53
End: 2023-02-22

## 2023-02-22 NOTE — TELEPHONE ENCOUNTER
Provider out of office are you able to address this? Patient was prescribed tamiflu at  wants to know if he can also take theraflu at night so he can get some sleep? Is he able to take both?

## 2023-02-24 ENCOUNTER — TELEPHONE (OUTPATIENT)
Dept: FAMILY MEDICINE CLINIC | Facility: HOSPITAL | Age: 53
End: 2023-02-24

## 2023-02-24 NOTE — TELEPHONE ENCOUNTER
Patient scheduled an appointment today but just called to say he has to cancel because he is unable to get out of work  He said he went to Urgent Care last weekend and was prescribed Tamiflu, which did nothing for him  He has a big conference next week that he needs "to be well for" and asked if we would prescribe an antibiotic for his cough and mucous  Please advise

## 2023-02-25 ENCOUNTER — OFFICE VISIT (OUTPATIENT)
Dept: URGENT CARE | Facility: CLINIC | Age: 53
End: 2023-02-25

## 2023-02-25 VITALS
DIASTOLIC BLOOD PRESSURE: 85 MMHG | RESPIRATION RATE: 18 BRPM | SYSTOLIC BLOOD PRESSURE: 126 MMHG | HEART RATE: 84 BPM | OXYGEN SATURATION: 97 % | TEMPERATURE: 98.5 F

## 2023-02-25 DIAGNOSIS — J06.9 VIRAL URI WITH COUGH: Primary | ICD-10-CM

## 2023-02-25 RX ORDER — BROMPHENIRAMINE MALEATE, PSEUDOEPHEDRINE HYDROCHLORIDE, AND DEXTROMETHORPHAN HYDROBROMIDE 2; 30; 10 MG/5ML; MG/5ML; MG/5ML
10 SYRUP ORAL 4 TIMES DAILY PRN
Qty: 120 ML | Refills: 0 | Status: SHIPPED | OUTPATIENT
Start: 2023-02-25

## 2023-02-25 RX ORDER — PREDNISONE 50 MG/1
50 TABLET ORAL DAILY
Qty: 5 TABLET | Refills: 0 | Status: SHIPPED | OUTPATIENT
Start: 2023-02-25 | End: 2023-03-02

## 2023-02-25 NOTE — PATIENT INSTRUCTIONS
You have been prescribed Bromfed and prednisone - take as directed  Your symptoms are consistent with a viral illness  For nasal/sinus congestion you can try steam, warm compresses, saline nasal spray, Neti pot, nasal steroid (Flonase, Nasocort) to be used at bedtime after the saline nasal spray, or nasal decongestant (Afrin - for 3 days only)  You can try a decongestant (Sudafed) if > 10years of age and no history of high blood pressure  For cough you can take an over-the-counter expectorant such as plain Robitussion or Mucinex  A spoonful of honey at bedtime may also be helpful  For sore throat you can use Cepacol lozenges, do warm salt water gargles, drink warm water with lemon or herbal teas, or use an over-the-counter throat spray (Chloraseptic)  You can take ibuprofen/Motrin and acetaminophen/Tylenol as needed for pain, fever, body aches  Drink plenty of fluids to stay hydrated  Follow up with your PCP in 5-7 days for persistent symptoms  Go to the ER if symptoms worsen

## 2023-02-25 NOTE — PROGRESS NOTES
[facility]        NAME: Mik Medellin is a 46 y o  male  : 1970    MRN: [id number]  DATE: 2023  TIME: 10:49 AM    Assessment and Plan   Viral URI with cough [J06 9]  1  Viral URI with cough  brompheniramine-pseudoephedrine-DM 30-2-10 MG/5ML syrup    predniSONE 50 mg tablet            Patient Instructions     You have been prescribed Bromfed and prednisone - take as directed  Your symptoms are consistent with a viral illness  For nasal/sinus congestion you can try steam, warm compresses, saline nasal spray, Neti pot, nasal steroid (Flonase, Nasocort) to be used at bedtime after the saline nasal spray, or nasal decongestant (Afrin - for 3 days only)  You can try a decongestant (Sudafed) if > 10years of age and no history of high blood pressure  For cough you can take an over-the-counter expectorant such as plain Robitussion or Mucinex  A spoonful of honey at bedtime may also be helpful  For sore throat you can use Cepacol lozenges, do warm salt water gargles, drink warm water with lemon or herbal teas, or use an over-the-counter throat spray (Chloraseptic)  You can take ibuprofen/Motrin and acetaminophen/Tylenol as needed for pain, fever, body aches  Drink plenty of fluids to stay hydrated  Follow up with your PCP in 5-7 days for persistent symptoms  Go to the ER if symptoms worsen  Chief Complaint     Chief Complaint   Patient presents with   • Cough     Pt has been sick for over a week with a worsening productive cough, producing a lot of phlegm  States the cough has been getting worse and traveling deeper into his chest           History of Present Illness       This is a 49yo male who presents for evaluation of cold symptoms x1 week  Reports cough and congestion that is persistent and worsening with new onset chest congestion  States cough is loose and productive of mucous  No known fevers, chills, sore throat, CP/SOB, N/V/D   Little to no relief with OTC medications  Review of Systems   Review of Systems   Constitutional: Negative for chills and fever  HENT: Positive for congestion  Negative for ear pain, sinus pressure and sore throat  Eyes: Negative for discharge and redness  Respiratory: Positive for cough and chest tightness  Negative for shortness of breath  Cardiovascular: Negative for chest pain  Gastrointestinal: Negative for diarrhea, nausea and vomiting  Genitourinary: Negative for difficulty urinating  Neurological: Negative for dizziness and headaches           Current Medications       Current Outpatient Medications:   •  acetaminophen (TYLENOL) 325 mg tablet, Take 2 tablets (650 mg total) by mouth every 6 (six) hours as needed for mild pain, Disp: , Rfl:   •  brompheniramine-pseudoephedrine-DM 30-2-10 MG/5ML syrup, Take 10 mL by mouth 4 (four) times a day as needed for congestion or cough, Disp: 120 mL, Rfl: 0  •  cetirizine (ZyrTEC) 10 mg tablet, Take 10 mg by mouth daily as needed for allergies, Disp: , Rfl:   •  fluticasone (FLONASE) 50 mcg/act nasal spray, Use 2 sprays in each nostril at bedtime as needed, Disp: , Rfl:   •  predniSONE 50 mg tablet, Take 1 tablet (50 mg total) by mouth daily for 5 days, Disp: 5 tablet, Rfl: 0    Current Allergies     Allergies as of 02/25/2023 - Reviewed 02/25/2023   Allergen Reaction Noted   • Chantix [varenicline] Swelling 10/14/2017            The following portions of the patient's history were reviewed and updated as appropriate: allergies, current medications, past family history, past medical history, past social history, past surgical history and problem list      Past Medical History:   Diagnosis Date   • Seasonal allergies        Past Surgical History:   Procedure Laterality Date   • KNEE ARTHROSCOPY Bilateral     debridement   • ROTATOR CUFF REPAIR Right     labrum, rotator cuff and bicep tendon repairs   • SHOULDER SURGERY Right 2014       Family History   Problem Relation Age of Onset   • Alcohol abuse Mother         denies   • Substance Abuse Mother         denies   • Mental illness Mother         denies   • Colon polyps Neg Hx    • Colon cancer Neg Hx          Medications have been verified  Objective   /85   Pulse 84   Temp 98 5 °F (36 9 °C)   Resp 18   SpO2 97%        Physical Exam     Physical Exam  Vitals and nursing note reviewed  Constitutional:       Appearance: He is ill-appearing  He is not toxic-appearing or diaphoretic  HENT:      Head: Normocephalic  Comments: No tenderness to frontal or maxillary sinus     Right Ear: Tympanic membrane, ear canal and external ear normal       Left Ear: Tympanic membrane, ear canal and external ear normal       Nose: Nose normal       Mouth/Throat:      Mouth: Mucous membranes are moist       Pharynx: Posterior oropharyngeal erythema present  No oropharyngeal exudate  Comments: PND  Eyes:      Conjunctiva/sclera: Conjunctivae normal       Pupils: Pupils are equal, round, and reactive to light  Cardiovascular:      Rate and Rhythm: Normal rate and regular rhythm  Pulses: Normal pulses  Heart sounds: Normal heart sounds  Pulmonary:      Effort: Pulmonary effort is normal       Breath sounds: Normal breath sounds  Comments: Audible congestion that clears with coughing  Musculoskeletal:         General: Normal range of motion  Lymphadenopathy:      Cervical: No cervical adenopathy  Skin:     General: Skin is warm and dry  Capillary Refill: Capillary refill takes less than 2 seconds  Neurological:      Mental Status: He is alert and oriented to person, place, and time     Psychiatric:         Mood and Affect: Mood normal

## 2023-06-20 ENCOUNTER — OFFICE VISIT (OUTPATIENT)
Dept: URGENT CARE | Facility: CLINIC | Age: 53
End: 2023-06-20
Payer: COMMERCIAL

## 2023-06-20 DIAGNOSIS — J01.10 ACUTE NON-RECURRENT FRONTAL SINUSITIS: Primary | ICD-10-CM

## 2023-06-20 LAB
SARS-COV-2 AG UPPER RESP QL IA: NEGATIVE
VALID CONTROL: NORMAL

## 2023-06-20 PROCEDURE — 99213 OFFICE O/P EST LOW 20 MIN: CPT | Performed by: PHYSICIAN ASSISTANT

## 2023-06-20 PROCEDURE — 87811 SARS-COV-2 COVID19 W/OPTIC: CPT | Performed by: PHYSICIAN ASSISTANT

## 2023-06-20 RX ORDER — PREDNISONE 50 MG/1
50 TABLET ORAL DAILY
Qty: 5 TABLET | Refills: 0 | Status: SHIPPED | OUTPATIENT
Start: 2023-06-20 | End: 2023-06-25

## 2023-06-20 RX ORDER — BENZONATATE 200 MG/1
200 CAPSULE ORAL 3 TIMES DAILY PRN
Qty: 20 CAPSULE | Refills: 0 | Status: SHIPPED | OUTPATIENT
Start: 2023-06-20

## 2023-06-20 RX ORDER — AZELASTINE 1 MG/ML
1 SPRAY, METERED NASAL 2 TIMES DAILY
Qty: 1 ML | Refills: 0 | Status: SHIPPED | OUTPATIENT
Start: 2023-06-20

## 2023-06-20 RX ORDER — AMOXICILLIN AND CLAVULANATE POTASSIUM 875; 125 MG/1; MG/1
1 TABLET, FILM COATED ORAL EVERY 12 HOURS SCHEDULED
Qty: 14 TABLET | Refills: 0 | Status: SHIPPED | OUTPATIENT
Start: 2023-06-20 | End: 2023-06-27

## 2023-06-20 NOTE — PROGRESS NOTES
[facility]        NAME: Ana Valverde III is a 46 y o  male  : 1970    MRN: [id number]  DATE: 2023  TIME: 10:35 AM    Assessment and Plan   Acute non-recurrent frontal sinusitis [J01 10]  1  Acute non-recurrent frontal sinusitis  Poct Covid 19 Rapid Antigen Test    amoxicillin-clavulanate (AUGMENTIN) 875-125 mg per tablet    benzonatate (TESSALON) 200 MG capsule    predniSONE 50 mg tablet    azelastine (ASTELIN) 0 1 % nasal spray            Patient Instructions     Take prednisone as directed until complete  Use additional medications as directed for symptomatic relief  Motrin and or Tylenol as needed for fevers and pain  Fill antibiotic after the next couple days and no improvement of symptoms have occurred  Treat plenty fluids stay well-hydrated  Follow up with PCP in 3-5 days  Proceed to  ER if symptoms worsen  Chief Complaint     Chief Complaint   Patient presents with   • Sinusitis     Pt presents with sore throat, cough with productive thick yellow mucous, ear fullness, nasal congestion, sinus pain and pressure, fatigue x 4 days  Pt states wife has been treated with abx for sinus infection  History of Present Illness       26-year-old male presents with sinus pain pressure congestion sore throats ear pressure headaches cough that is productive  Denies any fevers or chills  No abdominal pain nausea vomiting or diarrhea  No chest pain shortness of breath or chest tightness  Has been taking some over-the-counter medications with minimal relief  Reports wife had something similar and was treated with antibiotics  Sinusitis  This is a new problem  The current episode started in the past 7 days  The problem has been gradually worsening since onset  There has been no fever  The pain is moderate  Associated symptoms include congestion, coughing, sinus pressure and a sore throat  Pertinent negatives include no chills, headaches or shortness of breath   Past treatments include nothing  The treatment provided no relief  Review of Systems   Review of Systems   Constitutional: Negative  Negative for chills and fever  HENT: Positive for congestion, postnasal drip, rhinorrhea, sinus pressure, sinus pain and sore throat  Eyes: Negative  Respiratory: Positive for cough  Negative for shortness of breath  Cardiovascular: Negative  Gastrointestinal: Negative  Musculoskeletal: Negative  Skin: Negative  Neurological: Negative  Negative for headaches           Current Medications       Current Outpatient Medications:   •  acetaminophen (TYLENOL) 325 mg tablet, Take 2 tablets (650 mg total) by mouth every 6 (six) hours as needed for mild pain, Disp: , Rfl:   •  amoxicillin-clavulanate (AUGMENTIN) 875-125 mg per tablet, Take 1 tablet by mouth every 12 (twelve) hours for 7 days, Disp: 14 tablet, Rfl: 0  •  azelastine (ASTELIN) 0 1 % nasal spray, 1 spray into each nostril 2 (two) times a day Use in each nostril as directed, Disp: 1 mL, Rfl: 0  •  benzonatate (TESSALON) 200 MG capsule, Take 1 capsule (200 mg total) by mouth 3 (three) times a day as needed for cough, Disp: 20 capsule, Rfl: 0  •  cetirizine (ZyrTEC) 10 mg tablet, Take 10 mg by mouth daily as needed for allergies, Disp: , Rfl:   •  fluticasone (FLONASE) 50 mcg/act nasal spray, Use 2 sprays in each nostril at bedtime as needed, Disp: , Rfl:   •  predniSONE 50 mg tablet, Take 1 tablet (50 mg total) by mouth daily for 5 days, Disp: 5 tablet, Rfl: 0  •  brompheniramine-pseudoephedrine-DM 30-2-10 MG/5ML syrup, Take 10 mL by mouth 4 (four) times a day as needed for congestion or cough (Patient not taking: Reported on 6/20/2023), Disp: 120 mL, Rfl: 0    Current Allergies     Allergies as of 06/20/2023 - Reviewed 06/20/2023   Allergen Reaction Noted   • Chantix [varenicline] Swelling 10/14/2017            The following portions of the patient's history were reviewed and updated as appropriate: allergies, current medications, past family history, past medical history, past social history, past surgical history and problem list      Past Medical History:   Diagnosis Date   • Osteoarthritis    • Seasonal allergies        Past Surgical History:   Procedure Laterality Date   • KNEE ARTHROSCOPY Bilateral     debridement   • ROTATOR CUFF REPAIR Right     labrum, rotator cuff and bicep tendon repairs   • SHOULDER SURGERY Right 2014       Family History   Problem Relation Age of Onset   • Alcohol abuse Mother         denies   • Substance Abuse Mother         denies   • Mental illness Mother         denies   • Colon polyps Neg Hx    • Colon cancer Neg Hx          Medications have been verified  Objective   There were no vitals taken for this visit  No LMP for male patient  Physical Exam     Physical Exam  Vitals and nursing note reviewed  Constitutional:       General: He is not in acute distress  Appearance: Normal appearance  He is well-developed  HENT:      Head: Normocephalic and atraumatic  Right Ear: Hearing, tympanic membrane, ear canal and external ear normal  There is no impacted cerumen  Left Ear: Hearing, tympanic membrane, ear canal and external ear normal  There is no impacted cerumen  Nose: Congestion and rhinorrhea present  Right Sinus: Frontal sinus tenderness present  Left Sinus: Frontal sinus tenderness present  Mouth/Throat:      Pharynx: Uvula midline  No oropharyngeal exudate  Eyes:      General:         Right eye: No discharge  Left eye: No discharge  Conjunctiva/sclera: Conjunctivae normal    Cardiovascular:      Rate and Rhythm: Normal rate and regular rhythm  Heart sounds: Normal heart sounds  No murmur heard  Pulmonary:      Effort: Pulmonary effort is normal  No respiratory distress  Breath sounds: Normal breath sounds  No wheezing or rales  Abdominal:      General: Bowel sounds are normal       Palpations: Abdomen is soft  Tenderness: There is no abdominal tenderness  Musculoskeletal:         General: Normal range of motion  Cervical back: Normal range of motion and neck supple  Lymphadenopathy:      Cervical: No cervical adenopathy  Skin:     General: Skin is warm and dry  Neurological:      Mental Status: He is alert and oriented to person, place, and time     Psychiatric:         Mood and Affect: Mood normal

## 2023-06-20 NOTE — PATIENT INSTRUCTIONS
Take prednisone as directed until complete  Use additional medications as directed for symptomatic relief  Motrin and or Tylenol as needed for fevers and pain  Fill antibiotic after the next couple days and no improvement of symptoms have occurred  Treat plenty fluids stay well-hydrated  Follow up with PCP in 3-5 days  Proceed to  ER if symptoms worsen  Sinusitis   AMBULATORY CARE:   Sinusitis  is inflammation or infection of your sinuses  Sinusitis is most often caused by a virus  Acute sinusitis may last up to 12 weeks  Chronic sinusitis lasts longer than 12 weeks  Recurrent sinusitis means you have 4 or more infections in 1 year  Common signs and symptoms:   Fever    Pain, pressure, redness, or swelling around the forehead, cheeks, or eyes    Thick yellow or green discharge from your nose    Tenderness when you touch your face over your sinuses    Dry cough that happens mostly at night or when you lie down    Headache and face pain that is worse when you lean forward    Tooth pain, or pain when you chew    Seek care immediately if:   You have trouble breathing or wheezing that is getting worse  You have a stiff neck, a fever, or a bad headache  You cannot open your eye  Your eyeball bulges out or you cannot move your eye  You are more sleepy than normal, or you notice changes in your ability to think, move, or talk  You have swelling of your forehead or scalp  Call your doctor if:   You have vision changes, such as double vision  Your eye and eyelid are red, swollen, and painful  Your symptoms do not improve or go away after 10 days  You have nausea and are vomiting  Your nose is bleeding  You have questions or concerns about your condition or care  Medicines: Your symptoms may go away on their own  Your healthcare provider may recommend watchful waiting for up to 10 days before starting antibiotics   You may need any of the following:  Acetaminophen  decreases pain and fever  It is available without a doctor's order  Ask how much to take and how often to take it  Follow directions  Read the labels of all other medicines you are using to see if they also contain acetaminophen, or ask your doctor or pharmacist  Acetaminophen can cause liver damage if not taken correctly  NSAIDs , such as ibuprofen, help decrease swelling, pain, and fever  This medicine is available with or without a doctor's order  NSAIDs can cause stomach bleeding or kidney problems in certain people  If you take blood thinner medicine, always ask your healthcare provider if NSAIDs are safe for you  Always read the medicine label and follow directions  Nasal steroid sprays  may help decrease inflammation in your nose and sinuses  Decongestants  help reduce swelling and drain mucus in the nose and sinuses  They may help you breathe easier  Antihistamines  help dry mucus in the nose and relieve sneezing  Antibiotics  help treat or prevent a bacterial infection  Self-care:   Rinse your sinuses as directed  Use a sinus rinse device to rinse your nasal passages with a saline (salt water) solution or distilled water  Do not use tap water  This will help thin the mucus in your nose and rinse away pollen and dirt  It will also help reduce swelling so you can breathe normally  Use a humidifier  to increase air moisture in your home  This may make it easier for you to breathe and help decrease your cough  Sleep with your head elevated  Place an extra pillow under your head before you go to sleep to help your sinuses drain  Drink liquids as directed  Ask your healthcare provider how much liquid to drink each day and which liquids are best for you  Liquids will thin the mucus in your nose and help it drain  Avoid drinks that contain alcohol or caffeine  Do not smoke, and avoid secondhand smoke  Nicotine and other chemicals in cigarettes and cigars can make your symptoms worse  Ask your healthcare provider for information if you currently smoke and need help to quit  E-cigarettes or smokeless tobacco still contain nicotine  Talk to your healthcare provider before you use these products  Prevent the spread of germs:   Wash your hands often with soap and water  Wash your hands after you use the bathroom, change a child's diaper, or sneeze  Wash your hands before you prepare or eat food  Stay away from people who are sick  Some germs spread easily and quickly through contact  Follow up with your doctor as directed: You may be referred to an ear, nose, and throat specialist  Write down your questions so you remember to ask them during your visits  © Copyright [employer] 2022 Information is for End User's use only and may not be sold, redistributed or otherwise used for commercial purposes  The above information is an  only  It is not intended as medical advice for individual conditions or treatments  Talk to your doctor, nurse or pharmacist before following any medical regimen to see if it is safe and effective for you

## 2023-07-07 ENCOUNTER — APPOINTMENT (OUTPATIENT)
Dept: LAB | Facility: HOSPITAL | Age: 53
End: 2023-07-07

## 2023-07-07 DIAGNOSIS — Z00.8 HEALTH EXAMINATION IN POPULATION SURVEY: ICD-10-CM

## 2023-07-07 LAB
CHOLEST SERPL-MCNC: 225 MG/DL
HDLC SERPL-MCNC: 40 MG/DL
LDLC SERPL CALC-MCNC: 150 MG/DL (ref 0–100)
NONHDLC SERPL-MCNC: 185 MG/DL
TRIGL SERPL-MCNC: 176 MG/DL

## 2023-07-07 PROCEDURE — 80061 LIPID PANEL: CPT

## 2023-07-07 PROCEDURE — 83036 HEMOGLOBIN GLYCOSYLATED A1C: CPT

## 2023-07-07 PROCEDURE — 36415 COLL VENOUS BLD VENIPUNCTURE: CPT

## 2023-07-11 LAB
EST. AVERAGE GLUCOSE BLD GHB EST-MCNC: 108 MG/DL
HBA1C MFR BLD: 5.4 %

## 2023-11-21 ENCOUNTER — IMMUNIZATIONS (OUTPATIENT)
Dept: FAMILY MEDICINE CLINIC | Facility: HOSPITAL | Age: 53
End: 2023-11-21
Payer: COMMERCIAL

## 2023-11-21 DIAGNOSIS — Z23 ENCOUNTER FOR IMMUNIZATION: Primary | ICD-10-CM

## 2023-11-21 PROCEDURE — 90471 IMMUNIZATION ADMIN: CPT

## 2023-11-21 PROCEDURE — 90686 IIV4 VACC NO PRSV 0.5 ML IM: CPT

## 2024-01-23 ENCOUNTER — APPOINTMENT (OUTPATIENT)
Dept: RADIOLOGY | Facility: MEDICAL CENTER | Age: 54
End: 2024-01-23
Payer: COMMERCIAL

## 2024-01-23 ENCOUNTER — OFFICE VISIT (OUTPATIENT)
Dept: OBGYN CLINIC | Facility: MEDICAL CENTER | Age: 54
End: 2024-01-23
Payer: COMMERCIAL

## 2024-01-23 VITALS
HEART RATE: 76 BPM | DIASTOLIC BLOOD PRESSURE: 82 MMHG | SYSTOLIC BLOOD PRESSURE: 110 MMHG | BODY MASS INDEX: 35.81 KG/M2 | WEIGHT: 264.4 LBS | HEIGHT: 72 IN

## 2024-01-23 DIAGNOSIS — M25.561 RIGHT KNEE PAIN, UNSPECIFIED CHRONICITY: ICD-10-CM

## 2024-01-23 DIAGNOSIS — M17.31 POST-TRAUMATIC OSTEOARTHRITIS OF RIGHT KNEE: Primary | ICD-10-CM

## 2024-01-23 DIAGNOSIS — Z01.89 ENCOUNTER FOR LOWER EXTREMITY COMPARISON IMAGING STUDY: ICD-10-CM

## 2024-01-23 PROCEDURE — 99214 OFFICE O/P EST MOD 30 MIN: CPT | Performed by: STUDENT IN AN ORGANIZED HEALTH CARE EDUCATION/TRAINING PROGRAM

## 2024-01-23 PROCEDURE — 77073 BONE LENGTH STUDIES: CPT

## 2024-01-23 PROCEDURE — 73564 X-RAY EXAM KNEE 4 OR MORE: CPT

## 2024-01-23 PROCEDURE — 73560 X-RAY EXAM OF KNEE 1 OR 2: CPT

## 2024-01-23 RX ORDER — FOLIC ACID 1 MG/1
1 TABLET ORAL DAILY
Qty: 30 TABLET | Refills: 1 | Status: SHIPPED | OUTPATIENT
Start: 2024-01-23

## 2024-01-23 RX ORDER — CHLORHEXIDINE GLUCONATE ORAL RINSE 1.2 MG/ML
15 SOLUTION DENTAL ONCE
OUTPATIENT
Start: 2024-01-23 | End: 2024-01-23

## 2024-01-23 RX ORDER — ASCORBIC ACID 500 MG
500 TABLET ORAL 2 TIMES DAILY
Qty: 60 TABLET | Refills: 1 | Status: SHIPPED | OUTPATIENT
Start: 2024-01-23

## 2024-01-23 RX ORDER — MELATONIN
2000 DAILY
Qty: 60 TABLET | Refills: 1 | Status: SHIPPED | OUTPATIENT
Start: 2024-01-23

## 2024-01-23 RX ORDER — MULTIVIT-MIN/IRON FUM/FOLIC AC 7.5 MG-4
1 TABLET ORAL DAILY
Qty: 30 TABLET | Refills: 1 | Status: SHIPPED | OUTPATIENT
Start: 2024-01-23

## 2024-01-23 RX ORDER — TRANEXAMIC ACID 10 MG/ML
1000 INJECTION, SOLUTION INTRAVENOUS ONCE
OUTPATIENT
Start: 2024-01-23 | End: 2024-01-23

## 2024-01-23 RX ORDER — SODIUM CHLORIDE, SODIUM LACTATE, POTASSIUM CHLORIDE, CALCIUM CHLORIDE 600; 310; 30; 20 MG/100ML; MG/100ML; MG/100ML; MG/100ML
125 INJECTION, SOLUTION INTRAVENOUS CONTINUOUS
OUTPATIENT
Start: 2024-01-23

## 2024-01-23 RX ORDER — CHLORHEXIDINE GLUCONATE 4 G/100ML
SOLUTION TOPICAL DAILY PRN
OUTPATIENT
Start: 2024-01-23

## 2024-01-23 RX ORDER — ACETAMINOPHEN 325 MG/1
975 TABLET ORAL ONCE
OUTPATIENT
Start: 2024-01-23 | End: 2024-01-23

## 2024-01-23 RX ORDER — CEFAZOLIN SODIUM 2 G/50ML
2000 SOLUTION INTRAVENOUS ONCE
OUTPATIENT
Start: 2024-01-23 | End: 2024-01-23

## 2024-01-23 NOTE — PROGRESS NOTES
Knee New Office Note    Assessment:     1. Right knee pain, unspecified chronicity    2. Encounter for lower extremity comparison imaging study        Plan:     Problem List Items Addressed This Visit    None  Visit Diagnoses       Right knee pain, unspecified chronicity    -  Primary    Relevant Orders    XR knee 4+ vw right injury    XR bone length (scanogram)    Encounter for lower extremity comparison imaging study        Relevant Orders    XR knee 1 or 2 vw left           53 y/o male with Right knee pain secondary to severe, bone on bone OA of the medial compartment.  Xrays of the right knee reviewed showing severe arthritic changes with decreased joint space, osteophyte formation, and varus alignment.  On physical exam he has limited range of motion in flexion with pain as well as pain over the medial compartment only and varus alignment.  He has no pain over the lateral compartment or patellar crepitus/patellar grind.  Discussed conservative treatment options to include cortisone injections, visco injection, oral NSAIDs, Tylenol, activity modifications, staying active with low impact exercises through HEP, and weight loss.  We also reviewed surgical treatment options of UKA vs TKA.  His pain and arthritic changes appear to be limited to the medial compartment.  His ACL is intact on exam.  We will order an MRI of the right knee to evaluate the knee joint to see if he is a candidate for a UKA.    The patient has elected to proceed with Right UKA vs TKA. Risks and benefits of surgery to include but not limited to bleeding, infection, damage to surrounding structures, hardware failure, instability, fracture, dislocation, need for further surgery, continued pain, stiffness, blood clots, stroke, and heart attack was discussed with the patient. Informed consent was signed today in the office. The patient has met with our surgical schedulers and our preoperative joint replacement pathway has been initiated. All  questions were answered. Patient will follow-up 2 weeks post operatively. BMI is appropriate at 35.86 and is a nonsmoker.     Follow up to review MRI.      Subjective:     Patient ID: Clint Fierro III is a 54 y.o. male.  Chief Complaint:  HPI:  54 y.o. male who presents today for an evaluation of his RIGHT knee.  He has known OA of the bilateral knees which he has been treating for conservatively, right worse than left.  He has been seeing Dr. Powell and receiving cortisone and visco injections into the right knee.  His last visco series was about 1 year ago with no improvement in his symptoms.  He has been trying oral NSAIDs as well as Tylenol with only minimal relief of symptoms.  Pain is localized to the medial aspect of the knee. His pain is limiting his activities he enjoys such as hiking, walking, and walking.  It is also affecting his job as he is very active and walks a lot for his job, and he struggles with pain after working.  He is here today to discuss surgical treatment options for his right knee.       Allergy:  Allergies   Allergen Reactions    Chantix [Varenicline] Swelling     In legs     Medications:  all current active meds have been reviewed  Past Medical History:  Past Medical History:   Diagnosis Date    Osteoarthritis     Seasonal allergies      Past Surgical History:  Past Surgical History:   Procedure Laterality Date    KNEE ARTHROSCOPY Bilateral     debridement    ROTATOR CUFF REPAIR Right     labrum, rotator cuff and bicep tendon repairs    SHOULDER SURGERY Right 2014     Family History:  Family History   Problem Relation Age of Onset    Alcohol abuse Mother         denies    Substance Abuse Mother         denies    Mental illness Mother         denies    Colon polyps Neg Hx     Colon cancer Neg Hx      Social History:  Social History     Substance and Sexual Activity   Alcohol Use Yes     Social History     Substance and Sexual Activity   Drug Use No     Social History     Tobacco Use    Smoking Status Former    Types: Cigarettes   Smokeless Tobacco Current    Types: Snuff           ROS:  General: Per HPI  Skin: Negative, except if noted below  HEENT: Negative  Respiratory: Negative  Cardiovascular: Negative  Gastrointestinal: Negative  Urinary: Negative  Vascular: Negative  Musculoskeletal: Positive per HPI   Neurologic: Positive per HPI  Endocrine: Negative    Objective:  BP Readings from Last 1 Encounters:   01/23/24 110/82      Wt Readings from Last 1 Encounters:   01/23/24 120 kg (264 lb 6.4 oz)        Respiratory:   non-labored respirations    Lymphatics:  no palpable lymph nodes    Gait:   antalgic    Neurologic:   Alert and oriented times 3  Patient with normal sensation except as noted below  Deep tendon reflexes 2+ except as noted in MSK exam    Bilateral Lower Extremity:  Bilateral hips: Full ROM without pain    Right knee:     Inspection:  skin intact    Overall limb alignment varus    Effusion: trace    ROM 3-110 with pain    Extensor Lag: none    Palpation: medial Joint line tenderness to palpation    No pain with patellar grind    AP Stability at 90 deg stable    M/L stability in full extension stable    M/L stability in midflexion stable    Motor: 5/5 Q/HS/TA/GS/P    Pulses: 2+ DP / 2+ PT    SILT DP/SP/S/S/TN    Imaging:  My interpretation XR AP scanogram/AP bilateral knee/lateral/ibarra/sunrise right knee: severe joint space narrowing, subchondral sclerosis, subchondral cysts, osteophyte formation of the medial compartment, KG IV bone on bone changes. No fracture or dislocation.     BMI:   Estimated body mass index is 35.86 kg/m² as calculated from the following:    Height as of this encounter: 6' (1.829 m).    Weight as of this encounter: 120 kg (264 lb 6.4 oz).  BSA:   Estimated body surface area is 2.4 meters squared as calculated from the following:    Height as of this encounter: 6' (1.829 m).    Weight as of this encounter: 120 kg (264 lb 6.4 oz).           Scribe  Attestation      I,:  Marjorie Miles PA-C am acting as a scribe while in the presence of the attending physician.:       I,:  Christiano Delgado, DO personally performed the services described in this documentation    as scribed in my presence.:

## 2024-01-24 ENCOUNTER — TELEPHONE (OUTPATIENT)
Age: 54
End: 2024-01-24

## 2024-01-24 NOTE — TELEPHONE ENCOUNTER
Caller: Clint    Doctor: Danny / Jesika    Reason for call: Patient has some questions about the pre surgery vitamins that where prescribed for him    Call back#: [phone]

## 2024-01-25 NOTE — TELEPHONE ENCOUNTER
Caller: Patient    Doctor: Danny    Reason for call:  Patient returning miss call.  Will call direct number left on VM.    Call back#: n/a

## 2024-02-02 ENCOUNTER — TELEPHONE (OUTPATIENT)
Dept: OBGYN CLINIC | Facility: HOSPITAL | Age: 54
End: 2024-02-02

## 2024-02-02 NOTE — TELEPHONE ENCOUNTER
Preperative Elective Admission Assessment- spoke with patient    Living Situation:    Who does pt live with: spouse  What kind of home: multi-level  How do they enter the home: front  How many levels in home: 3  # of steps to enter home: 2  # of steps to second floor: 13  Are there handrails: Yes  Are there landings: Yes  Sleeping arrangement: first/entry floor  Where is Bathroom: entry level   Where is the tub or shower: walk in  Dogs or ther pets: dog     First Floor Setup:   Is there a bathroom: Yes  Where would pt sleep: couch     DME: Ordering RW. PROVIDED [facility] NUMBER TO CONTACT: [phone]   We discussed clearing pathways in the home and making sure there is accessibly to use the walker, for example, removing throw rugs.      Patient's Current Level of Function: Ambulates: Independently    Post-op Caregiver: spouse  Caregiver Name and phone number for Inpatient discharge needs: Rosaura [phone]  Currently receive any HHC/aides/community supports: No     Post-op Transport: spouse  To/from hospital: spouse  To/from PT 2-3x/week: spouse  Uses community transport now: No     Outpatient Physical Therapy Site:  Site:  PT   pre and post-op appts scheduled? Yes     Medication Management: self  Preferred Pharmacy for Post-op Medications: [facility] - [place] - [address] [place] [address] [place] [[id number]]   Blood Management Vitamin Regimen: Pt confirms taking as prescribed  Post-op anticoagulant: to be determined by surgical team postoperatively     DC Plan: Pt plans to be discharged home      Barriers to DC identified preoperatively: none identified    BMI: 35.86      Patient Education:  Pt educated on post-op pain, early mobilization (POD0), LOS goals, OP PT goals, and preoperative bathing. Patient educated that our goal is to appropriately discharge patient based off their post-op function while striving to maintain maximal independence. The goal is to discharge patient to home and for them to  attend outpatient physical therapy.    Assigned to care team? Yes

## 2024-02-03 LAB
DME PARACHUTE DELIVERY DATE ACTUAL: NORMAL
DME PARACHUTE DELIVERY DATE REQUESTED: NORMAL
DME PARACHUTE ITEM DESCRIPTION: NORMAL
DME PARACHUTE ORDER STATUS: NORMAL
DME PARACHUTE SUPPLIER NAME: NORMAL
DME PARACHUTE SUPPLIER PHONE: NORMAL

## 2024-02-05 ENCOUNTER — APPOINTMENT (OUTPATIENT)
Dept: LAB | Facility: HOSPITAL | Age: 54
End: 2024-02-05
Payer: COMMERCIAL

## 2024-02-05 DIAGNOSIS — M17.31 POST-TRAUMATIC OSTEOARTHRITIS OF RIGHT KNEE: ICD-10-CM

## 2024-02-05 LAB
ALBUMIN SERPL BCP-MCNC: 4.2 G/DL (ref 3.5–5)
ALP SERPL-CCNC: 43 U/L (ref 34–104)
ALT SERPL W P-5'-P-CCNC: 34 U/L (ref 7–52)
ANION GAP SERPL CALCULATED.3IONS-SCNC: 6 MMOL/L
APTT PPP: 27 SECONDS (ref 23–37)
AST SERPL W P-5'-P-CCNC: 19 U/L (ref 13–39)
ATRIAL RATE: 58 BPM
BASOPHILS # BLD AUTO: 0.05 THOUSANDS/ÂΜL (ref 0–0.1)
BASOPHILS NFR BLD AUTO: 1 % (ref 0–1)
BILIRUB SERPL-MCNC: 0.37 MG/DL (ref 0.2–1)
BUN SERPL-MCNC: 18 MG/DL (ref 5–25)
CALCIUM SERPL-MCNC: 9.4 MG/DL (ref 8.4–10.2)
CHLORIDE SERPL-SCNC: 105 MMOL/L (ref 96–108)
CHOLEST SERPL-MCNC: 220 MG/DL
CO2 SERPL-SCNC: 30 MMOL/L (ref 21–32)
CREAT SERPL-MCNC: 1.16 MG/DL (ref 0.6–1.3)
EOSINOPHIL # BLD AUTO: 0.18 THOUSAND/ÂΜL (ref 0–0.61)
EOSINOPHIL NFR BLD AUTO: 3 % (ref 0–6)
ERYTHROCYTE [DISTWIDTH] IN BLOOD BY AUTOMATED COUNT: 12.1 % (ref 11.6–15.1)
EST. AVERAGE GLUCOSE BLD GHB EST-MCNC: 114 MG/DL
GFR SERPL CREATININE-BSD FRML MDRD: 70 ML/MIN/1.73SQ M
GLUCOSE P FAST SERPL-MCNC: 92 MG/DL (ref 65–99)
HBA1C MFR BLD: 5.6 %
HCT VFR BLD AUTO: 45.8 % (ref 36.5–49.3)
HDLC SERPL-MCNC: 37 MG/DL
HGB BLD-MCNC: 15.3 G/DL (ref 12–17)
IMM GRANULOCYTES # BLD AUTO: 0.04 THOUSAND/UL (ref 0–0.2)
IMM GRANULOCYTES NFR BLD AUTO: 1 % (ref 0–2)
INR PPP: 1.04 (ref 0.84–1.19)
LDLC SERPL CALC-MCNC: 155 MG/DL (ref 0–100)
LYMPHOCYTES # BLD AUTO: 1.51 THOUSANDS/ÂΜL (ref 0.6–4.47)
LYMPHOCYTES NFR BLD AUTO: 26 % (ref 14–44)
MCH RBC QN AUTO: 29.7 PG (ref 26.8–34.3)
MCHC RBC AUTO-ENTMCNC: 33.4 G/DL (ref 31.4–37.4)
MCV RBC AUTO: 89 FL (ref 82–98)
MONOCYTES # BLD AUTO: 0.46 THOUSAND/ÂΜL (ref 0.17–1.22)
MONOCYTES NFR BLD AUTO: 8 % (ref 4–12)
NEUTROPHILS # BLD AUTO: 3.68 THOUSANDS/ÂΜL (ref 1.85–7.62)
NEUTS SEG NFR BLD AUTO: 61 % (ref 43–75)
NONHDLC SERPL-MCNC: 183 MG/DL
NRBC BLD AUTO-RTO: 0 /100 WBCS
P AXIS: 21 DEGREES
PLATELET # BLD AUTO: 172 THOUSANDS/UL (ref 149–390)
PMV BLD AUTO: 11.1 FL (ref 8.9–12.7)
POTASSIUM SERPL-SCNC: 4.3 MMOL/L (ref 3.5–5.3)
PR INTERVAL: 184 MS
PROT SERPL-MCNC: 7 G/DL (ref 6.4–8.4)
PROTHROMBIN TIME: 14.1 SECONDS (ref 11.6–14.5)
PSA SERPL-MCNC: 1.27 NG/ML (ref 0–4)
QRS AXIS: -7 DEGREES
QRSD INTERVAL: 88 MS
QT INTERVAL: 386 MS
QTC INTERVAL: 378 MS
RBC # BLD AUTO: 5.15 MILLION/UL (ref 3.88–5.62)
SODIUM SERPL-SCNC: 141 MMOL/L (ref 135–147)
T WAVE AXIS: 4 DEGREES
TRIGL SERPL-MCNC: 141 MG/DL
TSH SERPL DL<=0.05 MIU/L-ACNC: 1.97 UIU/ML (ref 0.45–4.5)
VENTRICULAR RATE: 58 BPM
WBC # BLD AUTO: 5.92 THOUSAND/UL (ref 4.31–10.16)

## 2024-02-05 PROCEDURE — 36415 COLL VENOUS BLD VENIPUNCTURE: CPT

## 2024-02-05 PROCEDURE — 86901 BLOOD TYPING SEROLOGIC RH(D): CPT

## 2024-02-05 PROCEDURE — 85610 PROTHROMBIN TIME: CPT

## 2024-02-05 PROCEDURE — 86850 RBC ANTIBODY SCREEN: CPT

## 2024-02-05 PROCEDURE — 86900 BLOOD TYPING SEROLOGIC ABO: CPT

## 2024-02-05 PROCEDURE — 85730 THROMBOPLASTIN TIME PARTIAL: CPT

## 2024-02-05 PROCEDURE — 83036 HEMOGLOBIN GLYCOSYLATED A1C: CPT

## 2024-02-05 PROCEDURE — 85025 COMPLETE CBC W/AUTO DIFF WBC: CPT

## 2024-02-06 LAB
ABO GROUP BLD: NORMAL
BLD GP AB SCN SERPL QL: NEGATIVE
DME PARACHUTE DELIVERY DATE ACTUAL: NORMAL
DME PARACHUTE DELIVERY DATE REQUESTED: NORMAL
DME PARACHUTE ITEM DESCRIPTION: NORMAL
DME PARACHUTE ORDER STATUS: NORMAL
DME PARACHUTE SUPPLIER NAME: NORMAL
DME PARACHUTE SUPPLIER PHONE: NORMAL
RH BLD: NEGATIVE
SPECIMEN EXPIRATION DATE: NORMAL

## 2024-02-07 ENCOUNTER — EVALUATION (OUTPATIENT)
Dept: PHYSICAL THERAPY | Facility: CLINIC | Age: 54
End: 2024-02-07
Payer: COMMERCIAL

## 2024-02-07 DIAGNOSIS — M17.31 POST-TRAUMATIC OSTEOARTHRITIS OF RIGHT KNEE: ICD-10-CM

## 2024-02-07 PROCEDURE — 97161 PT EVAL LOW COMPLEX 20 MIN: CPT | Performed by: PHYSICAL THERAPIST

## 2024-02-07 PROCEDURE — 97110 THERAPEUTIC EXERCISES: CPT | Performed by: PHYSICAL THERAPIST

## 2024-02-07 NOTE — PATIENT INSTRUCTIONS
Contact surgical nurse  navigator with any questions regarding preoperative plan or schedule.  Stop all over the counter supplements, herbal, naturopathic  medications for 1 week prior to surgery UNLESS prescribed by your surgeon  Hold NSAIDS (i.e. advil, alleve, motrin, ibuprofen, celebrex) minimum 7 days prior to surgery  Hold Asprin minimum 7 days prior to surgery  Recommend using Tylenol ( acetaminophen ) 500mg every eight hours during the first week post discharge in conjunction with any additional pain medicine prescribed by your surgeon  Use bowel medicines prescribed by your surgeon ( colace) daily post op during the first 1-2 weeks to avoid post operative constipation issues  Call [phone] with any post discharge concerns or medical issues  The morning of surgery take only the following medication with small sip of water: None

## 2024-02-07 NOTE — H&P (VIEW-ONLY)
Internal Medicine Pre-Operative Evaluation:     Reason for Visit: Pre-operative Evaluation for Risk Stratification and Optimization    Patient ID: Clint Fierro III is a 54 y.o. male.     Surgery: Arthroplasty of right knee  Referring Provider: Dr Delgado      Recommendations to Proceed withSurgery    Patient is considered to be Low risk for Medium risk procedure.     After evaluation and discussion with patient with emphasis that all surgery has some degree of inherent risk it is determined this procedure is of acceptable risk  medically.    Patient may proceed with planned procedure      Assessment    Pre-operative Medical Evaluation for planned surgery  Recommendations as listed in PLAN section below  Contact surgical nurse  navigator with any questions regarding preoperative plan or schedule.      Problem List Items Addressed This Visit          Unprioritized    Class 1 obesity with serious comorbidity and body mass index (BMI) of 34.0 to 34.9 in adult    Post-traumatic osteoarthritis of right knee     Failed outpatient conservative measures  Elected to undergo total joint arthroplasty            Other Visit Diagnoses       Preop exam for internal medicine    -  Primary          Abnormal EKG  Pt has no RCRI risk factors  Pt able to historically achieve at least 4 METS w/o issue  No strong evidence to suggest need for pre-op cardiovascular evaluation at this time         Plan:     1. Further preoperative workup as follows:   - none no further testing required may proceed with surgery    2. Medication Management/Recommendations:   - avoid use of NSAID such as motrin, advil, aleve for 7 days prior to surgery  - hold all OTC herbal or nutritional supplements 7 days before surgery    3. Patient requires further consultation with:   No Consults Required    4. Discharge Planning / Barriers to Discharge  none identified - patients has post discharge therapy plan in place, transportation arranged for discharge  day, adequate family support at home to assist with discharge to home.        Subjective:           History of Present Illness:     Clint Fierro III is a 54 y.o. male who presents to the office today for a preoperative consultation at the request of surgeon. The patient understands this is an elective procedure and not emergent. They are electing to undergo planned procedure with an understanding that all surgery has inherent risk. They have worked with their surgeon and failed conservative treatment measures. Today they present for preoperative risk assessment and recommendations for optimization in preparation for surgery.    Pt seen in surgical optimization center for upcoming proposed surgery. They have failed previous conservative measures and have elected surgical intervention.     Pt meets presurgical lab and BMI optimization goals.    Upon interview questioning patient is able to perform greater than 4 METs workload in daily life without any reported cardio-pulmonary symptoms.        ROS: No TIA's or unusual headaches, no dysphagia.  No prolonged cough. No dyspnea or chest pain on exertion.  No abdominal pain, change in bowel habits, black or bloody stools.  No urinary tract or BPH symptoms.  Positive reported pain in arthritic joint. Positive difficulty with gait. No skin rashes or issues.      Objective:    /89   Pulse 67   Ht 6' (1.829 m)   Wt 122 kg (270 lb)   BMI 36.62 kg/m²       General Appearance: no distress, conversive  HEENT: PERRLA, conjuctiva normal; oropharynx clear; mucous membranes moist;   Neck:  Supple, no lymphadenopathy or thyromegaly  Lungs: breath sounds normal, normal respiratory effort, no retractions, expiratory effort normal  CV: normal heart sounds S1/S2, PMI normal   ABD: soft non tender, no masses , no hepatic or splenomegaly  EXT: DP pulses intact, no lymphadenopathy, no edema  Skin: normal turgor, normal texture, no rash  Psych: affect normal, mood normal  Neuro:  AAOx3        The following portions of the patient's history were reviewed and updated as appropriate: allergies, current medications, past family history, past medical history, past social history, past surgical history and problem list.     Past History:       Past Medical History:   Diagnosis Date   • Hearing loss, left    • Hyperlipidemia    • Osteoarthritis     right knee   • Pain in right knee    • Pneumonia    • Seasonal allergies     Past Surgical History:   Procedure Laterality Date   • COLONOSCOPY     • FRACTURE SURGERY      right humerus   • KNEE ARTHROSCOPY Bilateral     debridement   • ROTATOR CUFF REPAIR Right     labrum, rotator cuff and bicep tendon repairs   • SHOULDER SURGERY Right 2014          Social History     Tobacco Use   • Smoking status: Former     Types: Cigarettes   • Smokeless tobacco: Current     Types: Snuff   • Tobacco comments:     Quit smoking 35 yrs ago   Vaping Use   • Vaping status: Never Used   Substance Use Topics   • Alcohol use: Yes     Comment: few x week   • Drug use: No     Family History   Problem Relation Age of Onset   • Alcohol abuse Mother         denies   • Substance Abuse Mother         denies   • Mental illness Mother         denies   • Colon polyps Neg Hx    • Colon cancer Neg Hx           Allergies:     Allergies   Allergen Reactions   • Chantix [Varenicline] Swelling     In legs        Current Medications:     Current Outpatient Medications   Medication Instructions   • acetaminophen (TYLENOL) 650 mg, Oral, Every 8 hours PRN, Taking 500mg   • ascorbic acid (VITAMIN C) 500 mg, Oral, 2 times daily   • azelastine (ASTELIN) 0.1 % nasal spray 1 spray, Nasal, 2 times daily, Use in each nostril as directed   • cetirizine (ZYRTEC) 10 mg, Oral, Daily PRN   • cholecalciferol (VITAMIN D3) 2,000 Units, Oral, Daily   • fluticasone (FLONASE) 50 mcg/act nasal spray Use 2 sprays in each nostril at bedtime as needed   • folic acid (FOLVITE) 1 mg, Oral, Daily   • Multiple  Vitamins-Minerals (multivitamin with minerals) tablet 1 tablet, Oral, Daily           PRE-OP WORKSHEET DATA    Assessment of Pre-Operative Risks     MLJ Quality Hard Stops:  BMI (<40) : Estimated body mass index is 36.62 kg/m² as calculated from the following:    Height as of this encounter: 6' (1.829 m).    Weight as of this encounter: 122 kg (270 lb).    Hgb ( >11):   Lab Results   Component Value Date    HGB 15.3 02/05/2024     HbA1c (<7.5) :   Lab Results   Component Value Date    HGBA1C 5.6 02/05/2024     GFR (>60) (Less then 45 = Nephrology consult):  CrCl cannot be calculated (Patient's most recent lab result is older than the maximum 7 days allowed.).      Active Decompensated Chronic Conditions which would delay surgery  No acutely decompensated medical issues such as recent CVA, MI, new onset arrhythmia, severe aortic stenosis, CHF, uncontrolled COPD       Exercise Capacity: (if less the 4 mets consider functional assessment via cardiac stress testing or consultation)    Able to walk 2 blocks without symptoms?: Yes  Able to walk 1 flights without symptoms?: Yes    Assessment of intra and post operative respiratory, hemodynamic and thrombotic risks     Prior Anesthesia Reactions: No     Personal history of venous thromboembolic disease? No    History of steroid use > 5 mg for >2 weeks within last year? No      The patient's risk factors for cardiac complications include :  none    Clint Fierro III has an IN HOSPITAL cardiac risk of RCI RISK CLASS I (0 risk factors, risk of major cardiac compl. appr. 0.5%) based on RCRI calculator    Cardiac Risk Estimation: per the Revised Cardiac Risk Index (Circ. 100:1043, 1999),        Pre-Op Data Reviewed:       Laboratory Results: I have personally reviewed the pertinent laboratory results/reports     EKG:I have personally reviewed pertinent reports.  . I personally reviewed and interpreted available tracings in the electronic medical record    Sinus  bradycardia  Possible Inferior infarct , age undetermined  Abnormal ECG  When compared with ECG of 14-OCT-2017 18:47,  Vent. rate has decreased BY  34 BPM  Borderline criteria for Inferior infarct are now Present  T wave inversion now evident in Inferior leads  Confirmed by Suleman Hull ([id number]) on 2/5/2024 2:14:21 PM      Pt has no RCRI risk factors  Pt able to historically achieve at least 4 METS w/o issue  No strong evidence to suggest need for pre-op cardiovascular evaluation at this time     OLD RECORDS: reviewed old records in the chart review section if EHR on day of visit.    Previous cardiopulmonary studies within the past year:  Echocardiogram: no  Cardiac Catheterization: no  Stress Test: no      Time of visit including pre-visit chart review, visit and post-visit coordination of plan and care , review of pre-surgical lab work, preparation and time spent documenting note in electronic medical record, time spent face-to-face in physical examination answering patient questions by care team 45 minutes             [facility]

## 2024-02-07 NOTE — PROGRESS NOTES
PT Evaluation     Today's date: 2024  Patient name: Clint Fierro III  : 1970  MRN: [id number]  Referring provider: Marjorie Miles PA-C  Dx:   Encounter Diagnosis     ICD-10-CM    1. Post-traumatic osteoarthritis of right knee  M17.31 Ambulatory referral to Physical Therapy                     Assessment  Assessment details: Clint Fierro III is a 54 y.o. male presenting to outpatient physical therapy at [facility] with complaints of R knee pain.  He presents with decreased R>L knee range of motion, decreased B LE strength, limited flexibility, altered gait pattern, poor balance, decreased tolerance to activity and decreased functional mobility due to Post-traumatic osteoarthritis of right knee.  He plans to have knee surgery on 24 with possible TKA.  He would benefit from skilled PT services in order to address these deficits and reach maximum level of function.  Thank you for the referral!  Impairments: abnormal gait, abnormal or restricted ROM, activity intolerance, impaired balance, impaired physical strength, lacks appropriate home exercise program and pain with function  Barriers to therapy: None  Understanding of Dx/Px/POC: excellent  Goals  ST.  Independent with HEP in 1 week - Met    Plan  Patient would benefit from: skilled PT and PT eval  Planned modality interventions: cryotherapy, electrical stimulation/Russian stimulation, TENS and thermotherapy: hydrocollator packs  Planned therapy interventions: ADL retraining, balance/weight bearing training, flexibility, functional ROM exercises, home exercise program, joint mobilization, manual therapy, neuromuscular re-education, postural training, strengthening, stretching, therapeutic activities, therapeutic exercise and gait training  Frequency: 1x week  Duration in weeks: 1  Treatment plan discussed with: patient        Subjective Evaluation    History of Present Illness  Mechanism of injury: Pt reports having R>L knee pain due  to OA.  He has been receiving cortisone and visco injections into his R knee.  His last visco series was about 1 year ago with no improvement in his symptoms.  He has been trying oral NSAIDs as well as Tylenol with only minimal relief of symptoms.  Pain is localized to the medial aspect of his R knee but also posterior-lateral at times.  His pain is limiting his activities he enjoys such as hiking and walking.  It is also affecting his job as he is very active and walks a lot for his job and he struggles with pain after working.  On 24 he will have unicompartmental R knee replacement with possible TKA if needed.   Hoping to have L TKA in 2024.          Recurrent probem    Quality of life: fair    Patient Goals  Patient goals for therapy: increased strength, independence with ADLs/IADLs, return to sport/leisure activities, return to work, increased motion, improved balance, decreased pain and decreased edema    Pain  Current pain ratin  At best pain ratin  At worst pain ratin  Quality: tight, sharp and dull ache  Relieving factors: rest  Aggravating factors: stair climbing, walking, standing and lifting  Progression: worsening    Social Support  Lives with: spouse    Employment status: working  Treatments  Previous treatment: medication and injection treatment  Current treatment: medication        Objective     Observations   Left Knee   Positive for effusion.     Right Knee   Positive for effusion.     Palpation     Additional Palpation Details  Severe tightness B H/S.    Tenderness   Left Knee   Tenderness in the lateral joint line and medial joint line.     Right Knee   Tenderness in the medial joint line.     Neurological Testing     Additional Neurological Details  Chronic numbness B lateral thighs.    Active Range of Motion   Left Knee   Flexion: 109 degrees   Extension: -8 degrees   Extensor la degrees     Right Knee   Flexion: 91 degrees   Extension: -10 degrees   Extensor lag:  "10 degrees     Passive Range of Motion   Left Knee   Flexion: 110 degrees with pain  Extension: -6 degrees     Right Knee   Flexion: 93 degrees with pain  Extension: -8 degrees     Strength/Myotome Testing     Left Hip   Planes of Motion   Flexion: 4+    Right Hip   Planes of Motion   Flexion: 4+    Left Knee   Flexion: 4+  Extension: 4  Quadriceps contraction: fair    Right Knee   Flexion: 4  Extension: 4-  Quadriceps contraction: fair    Ambulation     Ambulation: Level Surfaces   Ambulation without assistive device: independent    Ambulation: Stairs   Ascend stairs: independent  Pattern: non-reciprocal  Railings: one rail  Descend stairs: independent  Pattern: non-reciprocal  Railings: one rail  Curbs: unable    Observational Gait   Walking speed within functional limits.     Additional Observational Gait Details  Mod genu varus B.     Comments   Poor B LE balance.         POC EXPIRES On:  2/7/24  PRECAUTIONS:  None  CO-MORBIDITES:  None  PERSONAL FACTORS:  Planning L TKA on 2/29/24      Manuals HEP 2/7                                                               Neuro Re-Ed                                                                                                Ther Ex    Prone hangs L/R 2/7 2'           Quad sets L/R 2/7 5\" 10           Supine strap H/S stretch L/R 2/7 15\" 3 ea           Heel slides L/R 2/7 10                                                               Ther Activity                              Gait Training                              Modalities                                      "

## 2024-02-07 NOTE — PROGRESS NOTES
Internal Medicine Pre-Operative Evaluation:     Reason for Visit: Pre-operative Evaluation for Risk Stratification and Optimization    Patient ID: Clint Fierro III is a 54 y.o. male.     Surgery: Arthroplasty of right knee  Referring Provider: Dr Delgado      Recommendations to Proceed withSurgery    Patient is considered to be Low risk for Medium risk procedure.     After evaluation and discussion with patient with emphasis that all surgery has some degree of inherent risk it is determined this procedure is of acceptable risk  medically.    Patient may proceed with planned procedure      Assessment    Pre-operative Medical Evaluation for planned surgery  Recommendations as listed in PLAN section below  Contact surgical nurse  navigator with any questions regarding preoperative plan or schedule.      Problem List Items Addressed This Visit          Unprioritized    Class 1 obesity with serious comorbidity and body mass index (BMI) of 34.0 to 34.9 in adult    Post-traumatic osteoarthritis of right knee     Failed outpatient conservative measures  Elected to undergo total joint arthroplasty            Other Visit Diagnoses       Preop exam for internal medicine    -  Primary          Abnormal EKG  Pt has no RCRI risk factors  Pt able to historically achieve at least 4 METS w/o issue  No strong evidence to suggest need for pre-op cardiovascular evaluation at this time         Plan:     1. Further preoperative workup as follows:   - none no further testing required may proceed with surgery    2. Medication Management/Recommendations:   - avoid use of NSAID such as motrin, advil, aleve for 7 days prior to surgery  - hold all OTC herbal or nutritional supplements 7 days before surgery    3. Patient requires further consultation with:   No Consults Required    4. Discharge Planning / Barriers to Discharge  none identified - patients has post discharge therapy plan in place, transportation arranged for discharge  day, adequate family support at home to assist with discharge to home.        Subjective:           History of Present Illness:     Clint Fierro III is a 54 y.o. male who presents to the office today for a preoperative consultation at the request of surgeon. The patient understands this is an elective procedure and not emergent. They are electing to undergo planned procedure with an understanding that all surgery has inherent risk. They have worked with their surgeon and failed conservative treatment measures. Today they present for preoperative risk assessment and recommendations for optimization in preparation for surgery.    Pt seen in surgical optimization center for upcoming proposed surgery. They have failed previous conservative measures and have elected surgical intervention.     Pt meets presurgical lab and BMI optimization goals.    Upon interview questioning patient is able to perform greater than 4 METs workload in daily life without any reported cardio-pulmonary symptoms.        ROS: No TIA's or unusual headaches, no dysphagia.  No prolonged cough. No dyspnea or chest pain on exertion.  No abdominal pain, change in bowel habits, black or bloody stools.  No urinary tract or BPH symptoms.  Positive reported pain in arthritic joint. Positive difficulty with gait. No skin rashes or issues.      Objective:    /89   Pulse 67   Ht 6' (1.829 m)   Wt 122 kg (270 lb)   BMI 36.62 kg/m²       General Appearance: no distress, conversive  HEENT: PERRLA, conjuctiva normal; oropharynx clear; mucous membranes moist;   Neck:  Supple, no lymphadenopathy or thyromegaly  Lungs: breath sounds normal, normal respiratory effort, no retractions, expiratory effort normal  CV: normal heart sounds S1/S2, PMI normal   ABD: soft non tender, no masses , no hepatic or splenomegaly  EXT: DP pulses intact, no lymphadenopathy, no edema  Skin: normal turgor, normal texture, no rash  Psych: affect normal, mood normal  Neuro:  AAOx3        The following portions of the patient's history were reviewed and updated as appropriate: allergies, current medications, past family history, past medical history, past social history, past surgical history and problem list.     Past History:       Past Medical History:   Diagnosis Date   • Hearing loss, left    • Hyperlipidemia    • Osteoarthritis     right knee   • Pain in right knee    • Pneumonia    • Seasonal allergies     Past Surgical History:   Procedure Laterality Date   • COLONOSCOPY     • FRACTURE SURGERY      right humerus   • KNEE ARTHROSCOPY Bilateral     debridement   • ROTATOR CUFF REPAIR Right     labrum, rotator cuff and bicep tendon repairs   • SHOULDER SURGERY Right 2014          Social History     Tobacco Use   • Smoking status: Former     Types: Cigarettes   • Smokeless tobacco: Current     Types: Snuff   • Tobacco comments:     Quit smoking 35 yrs ago   Vaping Use   • Vaping status: Never Used   Substance Use Topics   • Alcohol use: Yes     Comment: few x week   • Drug use: No     Family History   Problem Relation Age of Onset   • Alcohol abuse Mother         denies   • Substance Abuse Mother         denies   • Mental illness Mother         denies   • Colon polyps Neg Hx    • Colon cancer Neg Hx           Allergies:     Allergies   Allergen Reactions   • Chantix [Varenicline] Swelling     In legs        Current Medications:     Current Outpatient Medications   Medication Instructions   • acetaminophen (TYLENOL) 650 mg, Oral, Every 8 hours PRN, Taking 500mg   • ascorbic acid (VITAMIN C) 500 mg, Oral, 2 times daily   • azelastine (ASTELIN) 0.1 % nasal spray 1 spray, Nasal, 2 times daily, Use in each nostril as directed   • cetirizine (ZYRTEC) 10 mg, Oral, Daily PRN   • cholecalciferol (VITAMIN D3) 2,000 Units, Oral, Daily   • fluticasone (FLONASE) 50 mcg/act nasal spray Use 2 sprays in each nostril at bedtime as needed   • folic acid (FOLVITE) 1 mg, Oral, Daily   • Multiple  Vitamins-Minerals (multivitamin with minerals) tablet 1 tablet, Oral, Daily           PRE-OP WORKSHEET DATA    Assessment of Pre-Operative Risks     MLJ Quality Hard Stops:  BMI (<40) : Estimated body mass index is 36.62 kg/m² as calculated from the following:    Height as of this encounter: 6' (1.829 m).    Weight as of this encounter: 122 kg (270 lb).    Hgb ( >11):   Lab Results   Component Value Date    HGB 15.3 02/05/2024     HbA1c (<7.5) :   Lab Results   Component Value Date    HGBA1C 5.6 02/05/2024     GFR (>60) (Less then 45 = Nephrology consult):  CrCl cannot be calculated (Patient's most recent lab result is older than the maximum 7 days allowed.).      Active Decompensated Chronic Conditions which would delay surgery  No acutely decompensated medical issues such as recent CVA, MI, new onset arrhythmia, severe aortic stenosis, CHF, uncontrolled COPD       Exercise Capacity: (if less the 4 mets consider functional assessment via cardiac stress testing or consultation)    Able to walk 2 blocks without symptoms?: Yes  Able to walk 1 flights without symptoms?: Yes    Assessment of intra and post operative respiratory, hemodynamic and thrombotic risks     Prior Anesthesia Reactions: No     Personal history of venous thromboembolic disease? No    History of steroid use > 5 mg for >2 weeks within last year? No      The patient's risk factors for cardiac complications include :  none    Clint Fierro III has an IN HOSPITAL cardiac risk of RCI RISK CLASS I (0 risk factors, risk of major cardiac compl. appr. 0.5%) based on RCRI calculator    Cardiac Risk Estimation: per the Revised Cardiac Risk Index (Circ. 100:1043, 1999),        Pre-Op Data Reviewed:       Laboratory Results: I have personally reviewed the pertinent laboratory results/reports     EKG:I have personally reviewed pertinent reports.  . I personally reviewed and interpreted available tracings in the electronic medical record    Sinus  bradycardia  Possible Inferior infarct , age undetermined  Abnormal ECG  When compared with ECG of 14-OCT-2017 18:47,  Vent. rate has decreased BY  34 BPM  Borderline criteria for Inferior infarct are now Present  T wave inversion now evident in Inferior leads  Confirmed by Suleman Hull ([id number]) on 2/5/2024 2:14:21 PM      Pt has no RCRI risk factors  Pt able to historically achieve at least 4 METS w/o issue  No strong evidence to suggest need for pre-op cardiovascular evaluation at this time     OLD RECORDS: reviewed old records in the chart review section if EHR on day of visit.    Previous cardiopulmonary studies within the past year:  Echocardiogram: no  Cardiac Catheterization: no  Stress Test: no      Time of visit including pre-visit chart review, visit and post-visit coordination of plan and care , review of pre-surgical lab work, preparation and time spent documenting note in electronic medical record, time spent face-to-face in physical examination answering patient questions by care team 45 minutes             [facility]

## 2024-02-08 ENCOUNTER — HOSPITAL ENCOUNTER (OUTPATIENT)
Dept: MRI IMAGING | Facility: HOSPITAL | Age: 54
End: 2024-02-08
Payer: COMMERCIAL

## 2024-02-08 DIAGNOSIS — M25.561 RIGHT KNEE PAIN, UNSPECIFIED CHRONICITY: ICD-10-CM

## 2024-02-08 DIAGNOSIS — M17.31 POST-TRAUMATIC OSTEOARTHRITIS OF RIGHT KNEE: ICD-10-CM

## 2024-02-08 PROCEDURE — G1004 CDSM NDSC: HCPCS

## 2024-02-08 PROCEDURE — 73721 MRI JNT OF LWR EXTRE W/O DYE: CPT

## 2024-02-12 ENCOUNTER — TELEPHONE (OUTPATIENT)
Dept: OBGYN CLINIC | Facility: HOSPITAL | Age: 54
End: 2024-02-12

## 2024-02-12 RX ORDER — SENNOSIDES 8.6 MG
650 CAPSULE ORAL EVERY 8 HOURS PRN
COMMUNITY

## 2024-02-12 NOTE — TELEPHONE ENCOUNTER
Caller: Clint     Doctor: Danny / Emilio     Reason for call: Patient is scheduled for TKA R on 2/29 with DR. Delgado.    Patient was ordered MRI R knee and had the imaging done on 2/8.    Patient was notified by [email or web address] Robert that the MRI was denied. .    He was told Dr Delgado team could contact [facility] for more information to have this covered.     Patient requesting call to discuss     Call back#: [phone]

## 2024-02-12 NOTE — PRE-PROCEDURE INSTRUCTIONS
Pre-Surgery Instructions:   Medication Instructions    acetaminophen (TYLENOL) 650 mg CR tablet Uses PRN- OK to take day of surgery    ascorbic acid (VITAMIN C) 500 MG tablet Hold day of surgery.    azelastine (ASTELIN) 0.1 % nasal spray Uses PRN- OK to take day of surgery    cetirizine (ZyrTEC) 10 mg tablet Uses PRN- OK to take day of surgery    cholecalciferol (VITAMIN D3) 1,000 units tablet Hold day of surgery.    fluticasone (FLONASE) 50 mcg/act nasal spray Uses PRN- OK to take day of surgery    folic acid (FOLVITE) 1 mg tablet Hold day of surgery.    Multiple Vitamins-Minerals (multivitamin with minerals) tablet Hold day of surgery.   Medication instructions for day surgery reviewed. Please use only a sip of water to take your instructed medications. Avoid all over the counter vitamins, supplements and NSAIDS for one week prior to surgery per anesthesia guidelines. Tylenol is ok to take as needed.     You will receive a call one business day prior to surgery with an arrival time and hospital directions. If your surgery is scheduled on a Monday, the hospital will be calling you on the Friday prior to your surgery. If you have not heard from anyone by 8pm, please call the hospital supervisor through the hospital  at [phone]. ([place] [phone] or [place] [phone]).    Do not eat or drink anything after midnight the night before your surgery, including candy, mints, lifesavers, or chewing gum. Do not drink alcohol 24hrs before your surgery. Try not to smoke at least 24hrs before your surgery.       Follow the pre surgery showering instructions as listed in the “My Surgical Experience Booklet” or otherwise provided by your surgeon's office. Do not use a blade to shave the surgical area 1 week before surgery. It is okay to use a clean electric clippers up to 24 hours before surgery. Do not apply any lotions, creams, including makeup, cologne, deodorant, or perfumes after showering on the day of  your surgery. Do not use dry shampoo, hair spray, hair gel, or any type of hair products.     No contact lenses, eye make-up, or artificial eyelashes. Remove nail polish, including gel polish, and any artificial, gel, or acrylic nails if possible. Remove all jewelry including rings and body piercing jewelry.     Wear causal clothing that is easy to take on and off. Consider your type of surgery.    Keep any valuables, jewelry, piercings at home. Please bring any specially ordered equipment (sling, braces) if indicated.    Arrange for a responsible person to drive you to and from the hospital on the day of your surgery. Visitor Guidelines discussed.     Call the surgeon's office with any new illnesses, exposures, or additional questions prior to surgery.    Please reference your “My Surgical Experience Booklet” for additional information to prepare for your upcoming surgery.

## 2024-02-14 ENCOUNTER — OFFICE VISIT (OUTPATIENT)
Age: 54
End: 2024-02-14
Payer: COMMERCIAL

## 2024-02-14 VITALS
BODY MASS INDEX: 36.57 KG/M2 | HEART RATE: 67 BPM | SYSTOLIC BLOOD PRESSURE: 131 MMHG | DIASTOLIC BLOOD PRESSURE: 89 MMHG | WEIGHT: 270 LBS | HEIGHT: 72 IN

## 2024-02-14 DIAGNOSIS — E78.2 MODERATE MIXED HYPERLIPIDEMIA NOT REQUIRING STATIN THERAPY: ICD-10-CM

## 2024-02-14 DIAGNOSIS — R94.31 ABNORMAL EKG: ICD-10-CM

## 2024-02-14 DIAGNOSIS — Z01.818 PREOP EXAM FOR INTERNAL MEDICINE: Primary | ICD-10-CM

## 2024-02-14 DIAGNOSIS — M17.31 POST-TRAUMATIC OSTEOARTHRITIS OF RIGHT KNEE: ICD-10-CM

## 2024-02-14 DIAGNOSIS — E66.09 CLASS 1 OBESITY DUE TO EXCESS CALORIES WITH SERIOUS COMORBIDITY AND BODY MASS INDEX (BMI) OF 34.0 TO 34.9 IN ADULT: ICD-10-CM

## 2024-02-14 PROCEDURE — 99214 OFFICE O/P EST MOD 30 MIN: CPT | Performed by: INTERNAL MEDICINE

## 2024-02-14 NOTE — TELEPHONE ENCOUNTER
Spoke with the patient, let him know that the letter he received may have margarette generated and sent out before the peer to peer was completed. Advised him that Dr. Delgado did complete the peer to peer and the MRI was approved. Let him know to call back if the date on the letter is after the date of the MRI.

## 2024-02-21 ENCOUNTER — OFFICE VISIT (OUTPATIENT)
Dept: FAMILY MEDICINE CLINIC | Facility: HOSPITAL | Age: 54
End: 2024-02-21
Payer: COMMERCIAL

## 2024-02-21 VITALS
TEMPERATURE: 99.6 F | BODY MASS INDEX: 36.68 KG/M2 | HEIGHT: 72 IN | OXYGEN SATURATION: 97 % | SYSTOLIC BLOOD PRESSURE: 120 MMHG | WEIGHT: 270.8 LBS | HEART RATE: 87 BPM | DIASTOLIC BLOOD PRESSURE: 80 MMHG

## 2024-02-21 DIAGNOSIS — J06.9 VIRAL UPPER RESPIRATORY TRACT INFECTION: Primary | ICD-10-CM

## 2024-02-21 LAB
SARS-COV-2 AG UPPER RESP QL IA: NEGATIVE
VALID CONTROL: NORMAL

## 2024-02-21 PROCEDURE — 99214 OFFICE O/P EST MOD 30 MIN: CPT | Performed by: NURSE PRACTITIONER

## 2024-02-21 PROCEDURE — 87811 SARS-COV-2 COVID19 W/OPTIC: CPT | Performed by: NURSE PRACTITIONER

## 2024-02-21 NOTE — PROGRESS NOTES
Assessment/Plan:     His symptoms are mild and normal exam.  Negative COVID test in office.   I did advise he treat symptoms with OTC medications as he has not tried anything.   He does have upcoming TKA so he was advised to call with no better in a few days or any worsening and will have low threshold to start antibiotic.      Diagnoses and all orders for this visit:    Viral upper respiratory tract infection  -     POCT Rapid Covid Ag          Subjective:     Patient ID: Clint Fierro III is a 54 y.o. male.    Started with stuffy nose (all right side) 1 week ago. Two days later felt worse, sore throat, left neck and ear feels swollen/clogged. Worse today, lightheaded. No cough but feel like he has mucus in his throat in the morning. Left side of neck is sore. Feels it into his throat. Wife is sick and now on antibiotics. No fevers. No taking anything OTC. No sob but wheezing while sleeping per wife. No cough Wife is sick also and needed antibiotics. Nasal congestion is improved. He is concerned because having knee replacement on 2/29.         Review of Systems   Constitutional:  Negative for chills and fever.   HENT:  Positive for congestion and sore throat. Negative for ear pain (fullness), sinus pressure and sinus pain.    Respiratory:  Positive for wheezing. Negative for cough and shortness of breath.    Musculoskeletal:  Negative for myalgias.   Neurological:  Positive for light-headedness. Negative for headaches.         The following portions of the patient's history were reviewed and updated as appropriate: allergies, current medications, past family history, past medical history, past social history, past surgical history and problem list.    Objective:  Vitals:    02/21/24 1133   BP: 120/80   Pulse: 87   Temp: 99.6 °F (37.6 °C)   SpO2: 97%      Physical Exam  Vitals reviewed.   Constitutional:       Appearance: Normal appearance.   HENT:      Right Ear: Tympanic membrane, ear canal and external ear  normal.      Left Ear: Tympanic membrane, ear canal and external ear normal.      Mouth/Throat:      Mouth: Mucous membranes are moist.      Pharynx: Oropharynx is clear.   Cardiovascular:      Rate and Rhythm: Normal rate and regular rhythm.      Heart sounds: Normal heart sounds. No murmur heard.  Pulmonary:      Effort: Pulmonary effort is normal.      Breath sounds: Normal breath sounds.   Lymphadenopathy:      Cervical: No cervical adenopathy (but tender left side).   Skin:     General: Skin is warm and dry.   Neurological:      Mental Status: He is alert and oriented to person, place, and time.   Psychiatric:         Mood and Affect: Mood normal.         Behavior: Behavior normal.         Thought Content: Thought content normal.         Judgment: Judgment normal.

## 2024-02-26 ENCOUNTER — ANESTHESIA EVENT (OUTPATIENT)
Age: 54
End: 2024-02-26
Payer: COMMERCIAL

## 2024-02-26 ENCOUNTER — TELEPHONE (OUTPATIENT)
Age: 54
End: 2024-02-26

## 2024-02-26 NOTE — TELEPHONE ENCOUNTER
Spoke with the patient, he would like to proceed with TKA rather than UKA.  We will plan to proceed with peer to peer tomorrow for hopeful auth.

## 2024-02-26 NOTE — TELEPHONE ENCOUNTER
Patient and his wife are asking that his medications be delivered to [facility] via the meds to beds program.  So if we can make sure his pharmacy is correct for Thursday that would be great!  Thanks!!!      Marjorie

## 2024-02-26 NOTE — TELEPHONE ENCOUNTER
Caller: Rosaura Fierro -Wife     Doctor: Danny     Reason for call: Questions regarding surgery and wanting to discuss any medications patient should be taking   Please advise     Call back#: [phone]

## 2024-02-28 ENCOUNTER — TELEPHONE (OUTPATIENT)
Age: 54
End: 2024-02-28

## 2024-02-28 NOTE — TELEPHONE ENCOUNTER
Caller: Patient     Doctor: Danny     Reason for call: Patient scheduled for tomorrow and would like to discuss medication     Call back#: [phone]

## 2024-02-28 NOTE — DISCHARGE INSTR - AVS FIRST PAGE
Dr. Delgado Knee Replacement    What to Expect/Activity  It is normal to have some discomfort in your knee for several days to weeks.  You are weight bearing as tolerated to your operative leg with assist devices.  Please use crutches/walker when ambulating until your follow-up  Swelling and discomfort in the knee is normal for several days after surgery. For the first 2-3 days, use ice around the knee to help. Use for 20-30 minutes every 1-2 hours for 48 hours, while awake. You may continue beyond 48 hours as needed.  Place one or two pillows underneath your calf, not your knee, to reduce swelling.  Physical therapy on your own at home should start as soon as possible (see below). Please perform heel slides and extension exercises on your own as well (see diagram).  Please use incentive spirometer 10 times per hour while awake (see diagram).    Dressing/Wound Care/Bathing  You may remove your toe-to-groin dressing 24 hours after surgery. There will be a surgical dressing over your incision that stays in place until follow-up unless water gets under the bandage and then it should be removed.   You may start showering 24 hours after surgery, the surgical dressing will remain in place. Please pat the dressing dry. If you notice the dressing appears saturated or is starting to come off, please replace with dry dressing.  You can keep the dressing in place until follow-up in the office.   Do not place any creams, ointments or gels on or around the incision.  No baths, swimming or submerging until cleared by Dr. Delgado    Pain Management/Medications  You may resume your usual medications.  Please take the following medications:  Anti-coagulation (blood clot prevention) - aspirin 81mg twice daily for 4 weeks  Pain medication:  Narcotic: Take as directed  NSAID/Anti-inflammatory: Take as directed  Tylenol 1000mg every 8 hours  Zofran (ondasetron) - 4mg every 8 hours as needed for nausea  Stool softeners (senna/colace) -  take daily to prevent constipation as narcotic pain medication causes constipation  Antibiotic - take as directed if prescribed   If you have questions or pain concerns, please contact the office. Pain medication cannot remove all post-operative pain.    Follow up/Call if:  The findings of your surgery will be explained to you and your family immediately after surgery. However, in the post-operative period, during recovery from anesthesia you may not fully remember or fully understand what was said. This will be again gone over when you return for your post-op appointment.  Please contact Dr. Delgado's office if you experience the following:  Excessive bleeding (bleeding through your dressing)  Fever greater than 101 degrees F after 48 hours (low grade fevers the day or two after surgery are normal)  Persistent nausea or vomiting  Decreased sensation or discoloration of the operative limb  Pain or swelling that is getting worse and not better with medication    Dr. Delgado's Office Contact: [phone]

## 2024-02-29 ENCOUNTER — HOSPITAL ENCOUNTER (OUTPATIENT)
Age: 54
Setting detail: OUTPATIENT SURGERY
Discharge: HOME | End: 2024-02-29
Attending: STUDENT IN AN ORGANIZED HEALTH CARE EDUCATION/TRAINING PROGRAM | Admitting: STUDENT IN AN ORGANIZED HEALTH CARE EDUCATION/TRAINING PROGRAM
Payer: COMMERCIAL

## 2024-02-29 ENCOUNTER — ANESTHESIA (OUTPATIENT)
Age: 54
End: 2024-02-29
Payer: COMMERCIAL

## 2024-02-29 ENCOUNTER — APPOINTMENT (OUTPATIENT)
Age: 54
End: 2024-02-29
Payer: COMMERCIAL

## 2024-02-29 VITALS
HEART RATE: 70 BPM | OXYGEN SATURATION: 98 % | HEIGHT: 72 IN | WEIGHT: 267.4 LBS | SYSTOLIC BLOOD PRESSURE: 125 MMHG | RESPIRATION RATE: 18 BRPM | BODY MASS INDEX: 36.22 KG/M2 | TEMPERATURE: 98 F | DIASTOLIC BLOOD PRESSURE: 77 MMHG

## 2024-02-29 DIAGNOSIS — M17.31 POST-TRAUMATIC OSTEOARTHRITIS OF RIGHT KNEE: Primary | ICD-10-CM

## 2024-02-29 PROCEDURE — 97167 OT EVAL HIGH COMPLEX 60 MIN: CPT

## 2024-02-29 PROCEDURE — 97530 THERAPEUTIC ACTIVITIES: CPT

## 2024-02-29 PROCEDURE — 27447 TOTAL KNEE ARTHROPLASTY: CPT | Performed by: PHYSICIAN ASSISTANT

## 2024-02-29 PROCEDURE — 97163 PT EVAL HIGH COMPLEX 45 MIN: CPT

## 2024-02-29 PROCEDURE — C1776 JOINT DEVICE (IMPLANTABLE): HCPCS | Performed by: STUDENT IN AN ORGANIZED HEALTH CARE EDUCATION/TRAINING PROGRAM

## 2024-02-29 PROCEDURE — 27447 TOTAL KNEE ARTHROPLASTY: CPT | Performed by: STUDENT IN AN ORGANIZED HEALTH CARE EDUCATION/TRAINING PROGRAM

## 2024-02-29 PROCEDURE — C1713 ANCHOR/SCREW BN/BN,TIS/BN: HCPCS | Performed by: STUDENT IN AN ORGANIZED HEALTH CARE EDUCATION/TRAINING PROGRAM

## 2024-02-29 PROCEDURE — C9290 INJ, BUPIVACAINE LIPOSOME: HCPCS | Performed by: ANESTHESIOLOGY

## 2024-02-29 PROCEDURE — 73560 X-RAY EXAM OF KNEE 1 OR 2: CPT

## 2024-02-29 PROCEDURE — 97535 SELF CARE MNGMENT TRAINING: CPT

## 2024-02-29 DEVICE — ATTUNE KNEE SYSTEM TIBIAL INSERT FIXED BEARING MEDIAL STABILIZED RIGHT AOX 9, 5MM
Type: IMPLANTABLE DEVICE | Site: KNEE | Status: FUNCTIONAL
Brand: ATTUNE

## 2024-02-29 DEVICE — SMARTSET HIGH PERFORMANCE MV MEDIUM VISCOSITY BONE CEMENT 40G
Type: IMPLANTABLE DEVICE | Site: KNEE | Status: FUNCTIONAL
Brand: SMARTSET

## 2024-02-29 DEVICE — ATTUNE PATELLA MEDIALIZED DOME 38MM CEMENTED AOX
Type: IMPLANTABLE DEVICE | Site: KNEE | Status: FUNCTIONAL
Brand: ATTUNE

## 2024-02-29 DEVICE — ATTUNE KNEE SYSTEM TIBIAL BASE AFFIXIUM FIXED BEARING SIZE 8
Type: IMPLANTABLE DEVICE | Site: KNEE | Status: FUNCTIONAL
Brand: ATTUNE AFFIXIUM

## 2024-02-29 DEVICE — ATTUNE KNEE SYSTEM FEMORAL POROCOAT CRUCIATE RETAINING SIZE 9 RIGHT CEMENTLESS
Type: IMPLANTABLE DEVICE | Site: KNEE | Status: FUNCTIONAL
Brand: ATTUNE

## 2024-02-29 RX ORDER — ACETAMINOPHEN 325 MG/1
975 TABLET ORAL EVERY 8 HOURS
Status: CANCELLED | OUTPATIENT
Start: 2024-02-29

## 2024-02-29 RX ORDER — HYDROMORPHONE HCL IN WATER/PF 6 MG/30 ML
0.2 PATIENT CONTROLLED ANALGESIA SYRINGE INTRAVENOUS
Status: DISCONTINUED | OUTPATIENT
Start: 2024-02-29 | End: 2024-02-29 | Stop reason: HOSPADM

## 2024-02-29 RX ORDER — FENTANYL CITRATE/PF 50 MCG/ML
50 SYRINGE (ML) INJECTION
Status: DISCONTINUED | OUTPATIENT
Start: 2024-02-29 | End: 2024-02-29 | Stop reason: HOSPADM

## 2024-02-29 RX ORDER — ACETAMINOPHEN 325 MG/1
650 TABLET ORAL EVERY 4 HOURS PRN
Status: CANCELLED | OUTPATIENT
Start: 2024-02-29

## 2024-02-29 RX ORDER — SODIUM CHLORIDE, SODIUM LACTATE, POTASSIUM CHLORIDE, CALCIUM CHLORIDE 600; 310; 30; 20 MG/100ML; MG/100ML; MG/100ML; MG/100ML
125 INJECTION, SOLUTION INTRAVENOUS CONTINUOUS
Status: DISCONTINUED | OUTPATIENT
Start: 2024-02-29 | End: 2024-02-29 | Stop reason: HOSPADM

## 2024-02-29 RX ORDER — HYDROMORPHONE HCL/PF 1 MG/ML
0.5 SYRINGE (ML) INJECTION
Status: DISCONTINUED | OUTPATIENT
Start: 2024-02-29 | End: 2024-02-29 | Stop reason: HOSPADM

## 2024-02-29 RX ORDER — GABAPENTIN 300 MG/1
300 CAPSULE ORAL
Status: CANCELLED | OUTPATIENT
Start: 2024-02-29

## 2024-02-29 RX ORDER — ACETAMINOPHEN 500 MG
1000 TABLET ORAL EVERY 8 HOURS
Qty: 60 TABLET | Refills: 0 | Status: SHIPPED | OUTPATIENT
Start: 2024-02-29

## 2024-02-29 RX ORDER — PROPOFOL 10 MG/ML
INJECTION, EMULSION INTRAVENOUS CONTINUOUS PRN
Status: DISCONTINUED | OUTPATIENT
Start: 2024-02-29 | End: 2024-02-29

## 2024-02-29 RX ORDER — ONDANSETRON 4 MG/1
4 TABLET, ORALLY DISINTEGRATING ORAL EVERY 6 HOURS PRN
Qty: 20 TABLET | Refills: 0 | Status: SHIPPED | OUTPATIENT
Start: 2024-02-29

## 2024-02-29 RX ORDER — SENNOSIDES 8.6 MG
1 TABLET ORAL DAILY
Status: CANCELLED | OUTPATIENT
Start: 2024-02-29

## 2024-02-29 RX ORDER — SODIUM CHLORIDE, SODIUM LACTATE, POTASSIUM CHLORIDE, CALCIUM CHLORIDE 600; 310; 30; 20 MG/100ML; MG/100ML; MG/100ML; MG/100ML
100 INJECTION, SOLUTION INTRAVENOUS CONTINUOUS
Status: CANCELLED | OUTPATIENT
Start: 2024-02-29

## 2024-02-29 RX ORDER — MAGNESIUM HYDROXIDE 1200 MG/15ML
LIQUID ORAL AS NEEDED
Status: DISCONTINUED | OUTPATIENT
Start: 2024-02-29 | End: 2024-02-29 | Stop reason: HOSPADM

## 2024-02-29 RX ORDER — KETAMINE HCL IN NACL, ISO-OSM 100MG/10ML
SYRINGE (ML) INJECTION AS NEEDED
Status: DISCONTINUED | OUTPATIENT
Start: 2024-02-29 | End: 2024-02-29

## 2024-02-29 RX ORDER — METOCLOPRAMIDE HYDROCHLORIDE 5 MG/ML
10 INJECTION INTRAMUSCULAR; INTRAVENOUS ONCE AS NEEDED
Status: DISCONTINUED | OUTPATIENT
Start: 2024-02-29 | End: 2024-02-29 | Stop reason: HOSPADM

## 2024-02-29 RX ORDER — ONDANSETRON 2 MG/ML
INJECTION INTRAMUSCULAR; INTRAVENOUS AS NEEDED
Status: DISCONTINUED | OUTPATIENT
Start: 2024-02-29 | End: 2024-02-29

## 2024-02-29 RX ORDER — OXYCODONE HYDROCHLORIDE 5 MG/1
5 TABLET ORAL EVERY 4 HOURS PRN
Status: DISCONTINUED | OUTPATIENT
Start: 2024-02-29 | End: 2024-02-29 | Stop reason: HOSPADM

## 2024-02-29 RX ORDER — BUPIVACAINE HYDROCHLORIDE 5 MG/ML
INJECTION, SOLUTION EPIDURAL; INTRACAUDAL
Status: COMPLETED | OUTPATIENT
Start: 2024-02-29 | End: 2024-02-29

## 2024-02-29 RX ORDER — ACETAMINOPHEN 325 MG/1
975 TABLET ORAL ONCE
Status: COMPLETED | OUTPATIENT
Start: 2024-02-29 | End: 2024-02-29

## 2024-02-29 RX ORDER — ASCORBIC ACID 500 MG
500 TABLET ORAL 2 TIMES DAILY
Status: CANCELLED | OUTPATIENT
Start: 2024-02-29

## 2024-02-29 RX ORDER — FOLIC ACID 1 MG/1
1 TABLET ORAL DAILY
Status: CANCELLED | OUTPATIENT
Start: 2024-02-29

## 2024-02-29 RX ORDER — CELECOXIB 200 MG/1
200 CAPSULE ORAL 2 TIMES DAILY
Qty: 60 CAPSULE | Refills: 0 | Status: SHIPPED | OUTPATIENT
Start: 2024-02-29

## 2024-02-29 RX ORDER — OXYCODONE HYDROCHLORIDE 10 MG/1
10 TABLET ORAL EVERY 4 HOURS PRN
Status: DISCONTINUED | OUTPATIENT
Start: 2024-02-29 | End: 2024-02-29 | Stop reason: HOSPADM

## 2024-02-29 RX ORDER — CEFAZOLIN SODIUM 2 G/50ML
2000 SOLUTION INTRAVENOUS ONCE
Status: COMPLETED | OUTPATIENT
Start: 2024-02-29 | End: 2024-02-29

## 2024-02-29 RX ORDER — MIDAZOLAM HYDROCHLORIDE 2 MG/2ML
INJECTION, SOLUTION INTRAMUSCULAR; INTRAVENOUS AS NEEDED
Status: DISCONTINUED | OUTPATIENT
Start: 2024-02-29 | End: 2024-02-29

## 2024-02-29 RX ORDER — CHLORHEXIDINE GLUCONATE 4 G/100ML
SOLUTION TOPICAL DAILY PRN
Status: DISCONTINUED | OUTPATIENT
Start: 2024-02-29 | End: 2024-02-29 | Stop reason: HOSPADM

## 2024-02-29 RX ORDER — TRANEXAMIC ACID 10 MG/ML
1000 INJECTION, SOLUTION INTRAVENOUS ONCE
Status: COMPLETED | OUTPATIENT
Start: 2024-02-29 | End: 2024-02-29

## 2024-02-29 RX ORDER — DOCUSATE SODIUM 100 MG/1
100 CAPSULE, LIQUID FILLED ORAL 2 TIMES DAILY
Status: CANCELLED | OUTPATIENT
Start: 2024-02-29

## 2024-02-29 RX ORDER — CHLORHEXIDINE GLUCONATE ORAL RINSE 1.2 MG/ML
15 SOLUTION DENTAL ONCE
Status: COMPLETED | OUTPATIENT
Start: 2024-02-29 | End: 2024-02-29

## 2024-02-29 RX ORDER — DIPHENHYDRAMINE HYDROCHLORIDE 50 MG/ML
12.5 INJECTION INTRAMUSCULAR; INTRAVENOUS ONCE AS NEEDED
Status: DISCONTINUED | OUTPATIENT
Start: 2024-02-29 | End: 2024-02-29 | Stop reason: HOSPADM

## 2024-02-29 RX ORDER — PANTOPRAZOLE SODIUM 40 MG/1
40 TABLET, DELAYED RELEASE ORAL DAILY
Status: CANCELLED | OUTPATIENT
Start: 2024-02-29

## 2024-02-29 RX ORDER — ONDANSETRON 2 MG/ML
4 INJECTION INTRAMUSCULAR; INTRAVENOUS ONCE AS NEEDED
Status: DISCONTINUED | OUTPATIENT
Start: 2024-02-29 | End: 2024-02-29 | Stop reason: HOSPADM

## 2024-02-29 RX ORDER — CALCIUM CARBONATE 500 MG/1
1000 TABLET, CHEWABLE ORAL DAILY PRN
Status: CANCELLED | OUTPATIENT
Start: 2024-02-29

## 2024-02-29 RX ORDER — PHENYLEPHRINE HCL IN 0.9% NACL 1 MG/10 ML
SYRINGE (ML) INTRAVENOUS AS NEEDED
Status: DISCONTINUED | OUTPATIENT
Start: 2024-02-29 | End: 2024-02-29

## 2024-02-29 RX ORDER — OXYCODONE HYDROCHLORIDE 5 MG/1
5 TABLET ORAL EVERY 4 HOURS PRN
Qty: 42 TABLET | Refills: 0 | Status: SHIPPED | OUTPATIENT
Start: 2024-02-29 | End: 2024-03-10

## 2024-02-29 RX ORDER — CEFAZOLIN SODIUM 2 G/50ML
2000 SOLUTION INTRAVENOUS EVERY 8 HOURS
Status: CANCELLED | OUTPATIENT
Start: 2024-02-29 | End: 2024-03-01

## 2024-02-29 RX ORDER — FENTANYL CITRATE 50 UG/ML
INJECTION, SOLUTION INTRAMUSCULAR; INTRAVENOUS AS NEEDED
Status: DISCONTINUED | OUTPATIENT
Start: 2024-02-29 | End: 2024-02-29

## 2024-02-29 RX ORDER — DEXAMETHASONE SODIUM PHOSPHATE 10 MG/ML
INJECTION, SOLUTION INTRAMUSCULAR; INTRAVENOUS AS NEEDED
Status: DISCONTINUED | OUTPATIENT
Start: 2024-02-29 | End: 2024-02-29 | Stop reason: HOSPADM

## 2024-02-29 RX ORDER — AMOXICILLIN 250 MG
1 CAPSULE ORAL DAILY
Qty: 30 TABLET | Refills: 0 | Status: SHIPPED | OUTPATIENT
Start: 2024-02-29

## 2024-02-29 RX ORDER — ONDANSETRON 2 MG/ML
4 INJECTION INTRAMUSCULAR; INTRAVENOUS EVERY 6 HOURS PRN
Status: CANCELLED | OUTPATIENT
Start: 2024-02-29

## 2024-02-29 RX ORDER — METHOCARBAMOL 500 MG/1
750 TABLET, FILM COATED ORAL ONCE
Status: COMPLETED | OUTPATIENT
Start: 2024-02-29 | End: 2024-02-29

## 2024-02-29 RX ORDER — SIMETHICONE 80 MG
80 TABLET,CHEWABLE ORAL 4 TIMES DAILY PRN
Status: CANCELLED | OUTPATIENT
Start: 2024-02-29

## 2024-02-29 RX ORDER — CEFADROXIL 500 MG/1
500 CAPSULE ORAL EVERY 12 HOURS SCHEDULED
Qty: 10 CAPSULE | Refills: 0 | Status: SHIPPED | OUTPATIENT
Start: 2024-02-29 | End: 2024-03-05

## 2024-02-29 RX ORDER — LIDOCAINE HYDROCHLORIDE 10 MG/ML
0.5 INJECTION, SOLUTION EPIDURAL; INFILTRATION; INTRACAUDAL; PERINEURAL ONCE AS NEEDED
Status: COMPLETED | OUTPATIENT
Start: 2024-02-29 | End: 2024-02-29

## 2024-02-29 RX ADMIN — MIDAZOLAM 1 MG: 1 INJECTION INTRAMUSCULAR; INTRAVENOUS at 10:18

## 2024-02-29 RX ADMIN — Medication 100 MCG: at 11:10

## 2024-02-29 RX ADMIN — FENTANYL CITRATE 25 MCG: 50 INJECTION INTRAMUSCULAR; INTRAVENOUS at 11:41

## 2024-02-29 RX ADMIN — PHENYLEPHRINE HYDROCHLORIDE 40 MCG/MIN: 10 INJECTION INTRAVENOUS at 11:12

## 2024-02-29 RX ADMIN — PROPOFOL 50 MG: 10 INJECTION, EMULSION INTRAVENOUS at 10:20

## 2024-02-29 RX ADMIN — SODIUM CHLORIDE, SODIUM LACTATE, POTASSIUM CHLORIDE, AND CALCIUM CHLORIDE: .6; .31; .03; .02 INJECTION, SOLUTION INTRAVENOUS at 11:31

## 2024-02-29 RX ADMIN — SODIUM CHLORIDE, SODIUM LACTATE, POTASSIUM CHLORIDE, AND CALCIUM CHLORIDE 125 ML/HR: .6; .31; .03; .02 INJECTION, SOLUTION INTRAVENOUS at 08:24

## 2024-02-29 RX ADMIN — Medication 30 MG: at 10:20

## 2024-02-29 RX ADMIN — PROPOFOL 100 MCG/KG/MIN: 10 INJECTION, EMULSION INTRAVENOUS at 10:18

## 2024-02-29 RX ADMIN — ACETAMINOPHEN 325MG 975 MG: 325 TABLET ORAL at 08:23

## 2024-02-29 RX ADMIN — Medication 10 MG: at 10:49

## 2024-02-29 RX ADMIN — Medication 200 MCG: at 11:16

## 2024-02-29 RX ADMIN — FENTANYL CITRATE 50 MCG: 50 INJECTION INTRAMUSCULAR; INTRAVENOUS at 10:18

## 2024-02-29 RX ADMIN — Medication 10 MG: at 11:24

## 2024-02-29 RX ADMIN — BUPIVACAINE HYDROCHLORIDE 20 ML: 5 INJECTION, SOLUTION EPIDURAL; INTRACAUDAL at 09:22

## 2024-02-29 RX ADMIN — CEFAZOLIN SODIUM 3000 MG: 2 SOLUTION INTRAVENOUS at 10:17

## 2024-02-29 RX ADMIN — METHOCARBAMOL TABLETS 750 MG: 500 TABLET, COATED ORAL at 13:48

## 2024-02-29 RX ADMIN — MIDAZOLAM 1 MG: 1 INJECTION INTRAMUSCULAR; INTRAVENOUS at 10:09

## 2024-02-29 RX ADMIN — LIDOCAINE HYDROCHLORIDE 50 ML: 10 INJECTION, SOLUTION EPIDURAL; INFILTRATION; INTRACAUDAL; PERINEURAL at 10:18

## 2024-02-29 RX ADMIN — BUPIVACAINE 15 ML: 13.3 INJECTION, SUSPENSION, LIPOSOMAL INFILTRATION at 09:20

## 2024-02-29 RX ADMIN — BUPIVACAINE HYDROCHLORIDE 10 ML: 5 INJECTION, SOLUTION EPIDURAL; INTRACAUDAL; PERINEURAL at 09:20

## 2024-02-29 RX ADMIN — MEPIVACAINE HYDROCHLORIDE 4 ML: 15 INJECTION, SOLUTION EPIDURAL; INFILTRATION at 10:16

## 2024-02-29 RX ADMIN — TRANEXAMIC ACID 1000 MG: 10 INJECTION, SOLUTION INTRAVENOUS at 10:21

## 2024-02-29 RX ADMIN — ONDANSETRON 4 MG: 2 INJECTION INTRAMUSCULAR; INTRAVENOUS at 10:13

## 2024-02-29 RX ADMIN — FENTANYL CITRATE 25 MCG: 50 INJECTION INTRAMUSCULAR; INTRAVENOUS at 11:24

## 2024-02-29 RX ADMIN — CHLORHEXIDINE GLUCONATE 0.12% ORAL RINSE 15 ML: 1.2 LIQUID ORAL at 08:23

## 2024-02-29 NOTE — OP NOTE
OPERATIVE REPORT  PATIENT NAME: Clint Fierro III  : 1970  MRN: [id number]  Pt Location:  [facility] OR ROOM 03    Surgery Date: 2024    Surgeons and Role:     * Christiano Delgado, DO - Primary     * MATHIEU Lugo-C - Assisting      * Mariely Foley AT-C - Assisting     Preop Diagnosis:  Post-traumatic osteoarthritis of right knee [M17.31]    Post-Op Diagnosis Codes:     * Post-traumatic osteoarthritis of right knee [M17.31]    Procedure(s):  Right - ARTHROPLASTY KNEE TOTAL    Specimens:  * No specimens in log *    Estimated Blood Loss:   25 cc    Drains:  * No LDAs found *    Anesthesia Type:   Spinal      Operative Indications:  Post-traumatic osteoarthritis of right knee [M17.31]  55 y/o male with Right knee pain secondary to severe, bone on bone OA of the medial compartment.  Xrays of the right knee reviewed showing severe arthritic changes with decreased joint space, osteophyte formation, and varus alignment.  On physical exam he has limited range of motion in flexion with pain as well as pain over the medial compartment and varus alignment. The patient has elected to proceed with Right TKA. Risks and benefits of surgery to include but not limited to bleeding, infection, damage to surrounding structures, hardware failure, instability, fracture, dislocation, need for further surgery, continued pain, stiffness, blood clots, stroke, and heart attack was discussed with the patient.     Operative Findings:  Grade IV changes medial compartment   Pre-op ROM   Post-op ROM 0-130+ calf to thigh gravity-assisted flexion     Implant Name Type Inv. Item Serial No.  Lot No. LRB No. Used Action   CEMENT BONE SMART SET GRAY MED VISC - [id number]  CEMENT BONE SMART SET GRAY MED VISC  DEPUY [id number] Right 1 Implanted   BASEPLATE TIBIAL SZ 8 FX BRNG  ATTUNE - [id number]  BASEPLATE TIBIAL SZ 8 FX BRNG  ATTUNE  DEPUY [id number] Right 1 Implanted   COMPONENT FEM SZ 9 RT  CR ATTUNE - [id number]   COMPONENT FEM SZ 9 RT  CR ATTUNE  DEPUY [id number] Right 1 Implanted   COMPONENT PATELLA 38MM MEDIAL DOME ATTUNE - [id number]  COMPONENT PATELLA 38MM MEDIAL DOME ATTUNE  DEPUY [id number] Right 1 Implanted   INSERT TIB SZ 9 5MM RT MED STAB ATTUNE - [id number]  INSERT TIB SZ 9 5MM RT MED STAB ATTUNE  DEPUY [id number] Right 1 Implanted     Complications:   None    Knee Technique: Suture (direct) Repair  Knee Approach: Medial Parapatellar    Procedure and Technique:  Patient was seen in the preoperative holding area.  Informed consent was confirmed and all questions were answered. Operative site was confirmed and marked. Patient was taken to the operating room and transferred to the operating room table. Anesthesia was performed. The patient was then placed supine and all bony prominences were well-padded. Right lower extremity was prepped and draped in usual sterile fashion with chlorhexidine scrub.  Patient was given perioperative antibiotics prior to incision and SCDs were placed on the non-operative leg.  A formal time-out was performed identifying the patient and confirmed operative site.  The knee was exsanguinated and a pneumatic tourniquet was inflated at 250 mmHg.  A slightly medial midline incision was performed from 3 fingerbreadths above the patella to the tibial tubercle.  This incision was carried down through skin and subcutaneous tissues to the level of the extensor mechanism.  A small medial skin flap was created.  A medial parapatellar approach was performed into the knee being careful to avoid the patellar tendon.  The anterior horn of the medial and lateral meniscus was released.  The medial peel was performed to the mid coronal line.  Lateral patellofemoral plica was excised and the patella was everted.  The fat pad was removed being careful to avoid the patellar tendon.  Peripheral osteophytes removed at this time as was the anterior cruciate ligament.  The intramedullary femoral drill was now used just  medial and anterior to the PCL insertion at the sulcus terminalis.  A drop janice was used to confirm intramedullary placement of the drill.  The distal femoral cutting jig was now inserted into the intramedullary canal set to 5° of valgus per pre-op template and 11 mm distal resection.  Distal resection was checked with an Elian wing and deemed appropriate.  The distal femur was cut.  At this time the distal femoral cutting guide was removed and the sizing guide was placed on the distal femur.  A posterior referencing system was used and pins placed with 3° of external rotation.  The femur was sized to the above size.  The appropriate cutting block was then placed over the pins and rotation was confirmed being parallel to the epicondylar access and perpendicular to [place] line.  Retractors were placed to protect the collateral ligaments and the anterior, posterior, posterior chamfer, anterior chamfer was cut. The distal 5th cut was also performed.  Bone fragments were removed with curved osteotome and then posterior Hohmann was placed to sublux the tibia forward.  An extramedullary tibial guide was used veing cognizant of varus valgus alignment, slope and depth of resection.  The guide was pinned in place and then a drop janice was used to confirm appropriate alignment.  The tibia was cut and removed.  At this time the bilateral menisci and posterior osteophytes of the femur were resected with the use of a laminar .  Once adequate osteophytes were removed the knee was trialed with the above implants.  The knee was found to have excellent stability with a 5 mm MS insert.  At this time the patella was measured and resected to appropriate depth.  The patella sized to the above size and 3 drill holes were performed.  At this time the knee was again taken through range of motion and found to have excellent stability and excellent patellar tracking.  The trials were floated and rotation of tibia marked.  The  femoral lug drills were drilled.  The femur and trial poly were removed and posterior Hohmann placed to sublux the tibia forward.  The Press-Fit tibial base plate was then aligned on the cut surface of the tibia matching where the trial was floated at approximately the medial 1/3 the tibial tubercle.  The base plate was pinned in place and prep tower impacted.  The Boss Reamer for the Press-Fit tibia was used 1st followed by keel punch.  The peripheral holes were then drilled as well.  At this time all trials were removed and the knee was copiously irrigated with normal saline solution.  The Press-Fit base plate was impacted into place and assured to be flush in all corners.  The MS polyethylene was impacted into the clean tray. The posterior Hohmann was then removed.  The Press-Fit femoral component was impacted in place and assured to be flush on all bony surfaces.  The patella cut surface was dried and the domed patella was cemented into place and held with a clamp.  Peripheral cement was cleared and the clamp was held until cement cured.  The tourniquet was released at 31 minutes and all bleeders were coagulated.  The knee was irrigated with Irrisept solution and then the remainder of the 3 L of normal saline solution.  The joint local was injected in the posterior capsule and around the tissues of the knee.  The knee was taken through a final range of motion and found to have excellent stability and patellar tracking.  All instrument and sponge counts were correct x2.  The arthrotomy was closed with #1 Stratafix suture.  Subcutaneous tissues were closed with 2-0 Vicryl.  The skin was closed with 3-0 Stratafix followed by Dermabond.  A sterile Mepilex was placed along with a thigh foot Ace wrap.  Patient was awoken from anesthesia and taken to recovery room in stable condition.    54M s/p R TKA 2/29  - multi-modal pain control  - ancef pre-op, duricef x 5 days as planned same day discharge   - DVT ppx: aspirin  81mg BID x 4 weeks  - PT/OT  - WBAT  - ROM as tolerated, pillow/blankets under achilles not behind knee while in bed  - f/u 10-14 days      I was present for the entire procedure., A qualified resident physician was not available., and A physician assistant was required during the procedure for retraction, tissue handling, dissection and suturing.    Patient Disposition:  PACU     SIGNATURE: Christiano Delgado,   DATE: February 29, 2024  TIME: 3:26 PM

## 2024-02-29 NOTE — ANESTHESIA PROCEDURE NOTES
Peripheral Block    Patient location during procedure: holding area  Start time: 2/29/2024 9:20 AM  Reason for block: at surgeon's request and post-op pain management  Staffing  Performed by: Pa Nguyen MD  Authorized by: Pa Nguyen MD    Preanesthetic Checklist  Completed: patient identified, IV checked, site marked, risks and benefits discussed, surgical consent, monitors and equipment checked, pre-op evaluation and timeout performed  Peripheral Block  Patient position: supine  Prep: ChloraPrep  Patient monitoring: frequent blood pressure checks, continuous pulse oximetry and heart rate  Block type: Adductor Canal  Laterality: right  Injection technique: single-shot  Procedures: ultrasound guided, Ultrasound guidance required for the procedure to increase accuracy and safety of medication placement and decrease risk of complications.  Ultrasound permanent image savedbupivacaine (PF) (MARCAINE) 0.5 % injection 20 mL - Perineural   10 mL - 2/29/2024 9:20:00 AM  bupivacaine liposomal (EXPAREL) 1.3 % injection 20 mL - Perineural   15 mL - 2/29/2024 9:20:00 AM  Needle  Needle type: Stimuplex   Needle localization: anatomical landmarks and ultrasound guidance  Assessment  Injection assessment: incremental injection, frequent aspiration, injected with ease, negative aspiration, negative for heart rate change, no paresthesia on injection, no symptoms of intraneural/intravenous injection and needle tip visualized at all times  Paresthesia pain: none  Post-procedure:  site cleaned  patient tolerated the procedure well with no immediate complications

## 2024-02-29 NOTE — ANESTHESIA PREPROCEDURE EVALUATION
Procedure:  ARTHROPLASTY KNEE TOTAL (Right: Knee)    Relevant Problems   ANESTHESIA (within normal limits)      CARDIO (within normal limits)      ENDO (within normal limits)      GI/HEPATIC (within normal limits)      /RENAL (within normal limits)      HEMATOLOGY (within normal limits)      MUSCULOSKELETAL   (+) Patellar tendinitis of left knee   (+) Post-traumatic osteoarthritis of right knee   (+) Primary osteoarthritis of left knee      NEURO/PSYCH (within normal limits)      PULMONARY (within normal limits)      Other   (+) Class 1 obesity with serious comorbidity and body mass index (BMI) of 34.0 to 34.9 in adult   (+) Dyslipidemia        Physical Exam    Airway    Mallampati score: II  TM Distance: >3 FB  Neck ROM: full     Dental   No notable dental hx     Cardiovascular  Rhythm: regular, Rate: normal, Cardiovascular exam normal    Pulmonary  Pulmonary exam normal Breath sounds clear to auscultation    Other Findings        Anesthesia Plan  ASA Score- 2     Anesthesia Type- spinal with ASA Monitors.         Additional Monitors:     Airway Plan:     Comment: Right adductor canal and IPACK blocks for postoperative pain control.       Plan Factors-Exercise tolerance (METS): >4 METS.    Chart reviewed. EKG reviewed. Imaging results reviewed. Existing labs reviewed. Patient summary reviewed.          Obstructive sleep apnea risk education given perioperatively.        Induction- intravenous.    Postoperative Plan- Plan for postoperative opioid use.     Informed Consent- Anesthetic plan and risks discussed with patient.  I personally reviewed this patient with the CRNA. Discussed and agreed on the Anesthesia Plan with the CRNA..              Recent labs personally reviewed:  Lab Results   Component Value Date    WBC 5.92 02/05/2024    HGB 15.3 02/05/2024     02/05/2024     Lab Results   Component Value Date    K 4.3 02/05/2024    BUN 18 02/05/2024    CREATININE 1.16 02/05/2024     Lab Results   Component  Value Date    PTT 27 02/05/2024      Lab Results   Component Value Date    INR 1.04 02/05/2024       Blood type O    Lab Results   Component Value Date    HGBA1C 5.6 02/05/2024       I, Pa Nguyen MD, have personally seen and evaluated the patient prior to anesthetic care.  I have reviewed the pre-anesthetic record, and other medical records if appropriate to the anesthetic care.  If a CRNA is involved in the case, I have reviewed the CRNA assessment, if present, and agree. Risks/benefits and alternatives discussed with patient including possible PONV, sore throat, and possibility of rare anesthetic and surgical emergencies.

## 2024-02-29 NOTE — PLAN OF CARE
Problem: OCCUPATIONAL THERAPY ADULT  Goal: Performs self-care activities at highest level of function for planned discharge setting.  See evaluation for individualized goals.  Description: Treatment Interventions: ADL retraining, Functional transfer training, Endurance training, Patient/family training, Equipment evaluation/education, Continued evaluation, Energy conservation, Activityengagement          See flowsheet documentation for full assessment, interventions and recommendations.   Note: Limitation: Decreased ADL status, Decreased endurance, Decreased self-care trans, Decreased high-level ADLs  Prognosis: Good  Assessment: Pt is a 54 y.o. male seen for OT evaluation s/p adm to  [facility]  on 2/29/2024 w/ Post-traumatic osteoarthritis of right knee . Comorbidities affecting pt’s functional performance include a significant PMH of dyslipidemia, LE edema, osteoarthritis. Pt with active OT orders and activity orders for Activity beginning POD #0. WBAT RLE. Pt lives with spouse in a multilevel house with 2 SERGE and 13 steps to 2nd floor. Pt has walkin shower with built in shower seats. Pt has standard toilets. Pt plans to sleep on couch for recovery. (+) . At baseline, pt was independent with all ADLs/IADLs. Pt completed supine to sit with supervision. Pt completed sit to stand with supervision and use of RW for stability. Pt completed functional mobility with RW and min verbal cues for hand placement and safe technique. Pt completed UB/LB dressing with supervision and min verbal cues for proper technique of LE. Upon evaluation, pt currently requires supervision for UB ADLs, supervision for LB ADLs, supervision for toileting, supervision for bed mobility, supervision for functional mobility, and supervision for transfers 2* the following deficits impacting occupational performance: weakness, decreased strength , decreased balance, decreased activity tolerance, increased pain, and orthopedic  restrictions. These impairments, as well at pt’s personal factors of: SERGE home environment, steps within home environment, difficulty performing ADLs, difficulty performing IADLs, difficulty performing transfers/mobility, WBS, fall risk , and new use of AD for functional transfers/mobility limit pt’s ability to safely engage in all baseline areas of occupation. Based on the aforementioned OT evaluation, functional performance deficits, and assessments, pt has been identified as a high complexity evaluation. Pt to continue to benefit from continued acute OT services during hospital stay to address defined deficits and to maximize level of functional independence in the following Occupational Performance areas: grooming, bathing/shower, toilet hygiene, dressing, socialization, health maintenance, functional mobility, community mobility, household maintenance, and job performance/volunteering. From OT standpoint, recommend no post acute rehabilitation needs upon D/C. OT will continue to follow pt 3-5x/wk.     Rehab Resource Intensity Level, OT: No post-acute rehabilitation needs

## 2024-02-29 NOTE — OCCUPATIONAL THERAPY NOTE
Occupational Therapy Evaluation     Patient Name: Clint Fierro III  Today's Date: 2/29/2024  Problem List  Principal Problem:    Post-traumatic osteoarthritis of right knee    Past Medical History  Past Medical History:   Diagnosis Date    Hearing loss, left     Hyperlipidemia     Osteoarthritis     right knee    Pain in right knee     Pneumonia     Seasonal allergies      Past Surgical History  Past Surgical History:   Procedure Laterality Date    COLONOSCOPY      FRACTURE SURGERY      right humerus    KNEE ARTHROSCOPY Bilateral     debridement    ROTATOR CUFF REPAIR Right     labrum, rotator cuff and bicep tendon repairs    SHOULDER SURGERY Right 2014        02/29/24 1503   OT Last Visit   OT Visit Date 02/29/24   Note Type   Note type Evaluation   Additional Comments Pt greeted supine, agreeable to OT evaluation.   Pain Assessment   Pain Assessment Tool 0-10   Pain Score 2  (10/10 with movement)   Pain Location/Orientation Orientation: Right;Location: Knee   Restrictions/Precautions   Weight Bearing Precautions Per Order Yes   RLE Weight Bearing Per Order WBAT   Other Precautions WBS;Multiple lines;Fall Risk;Pain   Home Living   Type of Home House   Home Layout Multi-level;Stairs to enter with rails;1/2 bath on main level  (1+1 SERGE no railing, 9+4 steps to 2nd floor railing L side)   Bathroom Shower/Tub Walk-in shower   Bathroom Toilet Standard   Bathroom Equipment Built-in shower seat   Bathroom Accessibility Accessible   Home Equipment Walker;Crutches   Prior Function   Level of [place] Independent with ADLs;Independent with functional mobility;Independent with IADLS   Lives With Spouse;Daughter   Receives Help From Family   IADLs Independent with driving;Independent with medication management;Independent with meal prep   Falls in the last 6 months 0   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)   Comments used a knee brace prior for 3 years but wore out eventually   Lifestyle    Autonomy independent with all ADLs/IADLs, no AD use at baseline   Reciprocal Relationships Spouse Rosaura   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   Intrinsic Gratification hunt, fish, hiking   General   Family/Caregiver Present Yes   ADL   Where Assessed Edge of bed   Eating Assistance 6  Modified independent   Grooming Assistance 5  Supervision/Setup   UB Bathing Assistance 5  Supervision/Setup   LB Bathing Assistance 5  Supervision/Setup   UB Dressing Assistance 5  Supervision/Setup   LB Dressing Assistance 5  Supervision/Setup   Toileting Assistance  5  Supervision/Setup   Bed Mobility   Supine to Sit 5  Supervision   Additional items Increased time required;LE management;Verbal cues   Additional Comments inc verbal cues for hand placement and safe technique: BP Supine: 143/77. BP EOB: 124/77. Standin/77. Post ambulation: 131/77   Transfers   Sit to Stand 5  Supervision   Additional items Increased time required;Verbal cues  (RW)   Stand to Sit 5  Supervision   Additional items Increased time required;Verbal cues  (RW)   Additional Comments inc verbal cues for hand placement and safe technique   Functional Mobility   Functional Mobility 5  Supervision   Additional Comments supervision with RW functional household distances   Additional items Rolling walker   Balance   Static Sitting Good   Dynamic Sitting Fair +   Static Standing Fair   Dynamic Standing Fair -   Ambulatory Fair -   Activity Tolerance   Activity Tolerance Patient limited by fatigue;Patient limited by pain   Medical Staff Made Aware PT Jamil, Pt seen for co-evaluation with skilled Physical Therapy due to clinically unstable presentation , medical complexity, fall risk, functional balance, limited activity tolerance whish is a decline from PLOF and may impact overall safety.   Nurse Made Aware LESTER HERNANDEZ Assessment   RUE Assessment WFL   LUE Assessment   LUE Assessment WFL   Hand Function   Gross Motor Coordination Functional   Fine  Motor Coordination Functional   Cognition   Overall Cognitive Status WFL   Arousal/Participation Alert;Cooperative   Attention Within functional limits   Orientation Level Oriented X4   Memory Within functional limits   Following Commands Follows one step commands without difficulty   Comments Cooperative and motivated to go home   Assessment   Limitation Decreased ADL status;Decreased endurance;Decreased self-care trans;Decreased high-level ADLs   Prognosis Good   Assessment Pt is a 54 y.o. male seen for OT evaluation s/p adm to  [facility]  on 2/29/2024 w/ Post-traumatic osteoarthritis of right knee . Comorbidities affecting pt’s functional performance include a significant PMH of dyslipidemia, LE edema, osteoarthritis. Pt with active OT orders and activity orders for Activity beginning POD #0. WBAT RLE. Pt lives with spouse in a multilevel house with 2 SERGE and 13 steps to 2nd floor. Pt has walkin shower with built in shower seats. Pt has standard toilets. Pt plans to sleep on couch for recovery. (+) . At baseline, pt was independent with all ADLs/IADLs. Pt completed supine to sit with supervision. Pt completed sit to stand with supervision and use of RW for stability. Pt completed functional mobility with RW and min verbal cues for hand placement and safe technique. Pt completed UB/LB dressing with supervision and min verbal cues for proper technique of LE. Upon evaluation, pt currently requires supervision for UB ADLs, supervision for LB ADLs, supervision for toileting, supervision for bed mobility, supervision for functional mobility, and supervision for transfers 2* the following deficits impacting occupational performance: weakness, decreased strength , decreased balance, decreased activity tolerance, increased pain, and orthopedic restrictions. These impairments, as well at pt’s personal factors of: SERGE home environment, steps within home environment, difficulty performing ADLs, difficulty  performing IADLs, difficulty performing transfers/mobility, WBS, fall risk , and new use of AD for functional transfers/mobility limit pt’s ability to safely engage in all baseline areas of occupation. Based on the aforementioned OT evaluation, functional performance deficits, and assessments, pt has been identified as a high complexity evaluation. Pt to continue to benefit from continued acute OT services during hospital stay to address defined deficits and to maximize level of functional independence in the following Occupational Performance areas: grooming, bathing/shower, toilet hygiene, dressing, socialization, health maintenance, functional mobility, community mobility, household maintenance, and job performance/volunteering. From OT standpoint, recommend no post acute rehabilitation needs upon D/C. OT will continue to follow pt 3-5x/wk.   Goals   STG Time Frame 3-5   Short Term Goal #1 Pt will improve activity tolerance to G for min 30 min treatment sessions for increase engagement in functional tasks   Short Term Goal #2 Pt will complete bed mobility at a mod I level w/ G balance/safety demonstrated to decrease caregiver assistance required   Short Term Goal  Pt will complete LB dressing/self care w/ mod I using adaptive device and DME as needed   LTG Time Frame 7-10   Long Term Goal #1 Pt will complete toileting w/ mod I w/ G hygiene/thoroughness using DME as needed   Long Term Goal #2 Pt will improve functional transfers to mod I on/off all surfaces using DME as needed w/ G balance/safety   Long Term Goal Pt will improve functional mobility during ADL/IADL/leisure tasks to mod I using DME as needed w/ G balance/safety   Plan   Treatment Interventions ADL retraining;Functional transfer training;Endurance training;Patient/family training;Equipment evaluation/education;Continued evaluation;Energy conservation;Activityengagement   Goal Expiration Date 03/07/24   OT Treatment Day 0   OT Frequency 3-5x/wk    Discharge Recommendation   Rehab Resource Intensity Level, OT No post-acute rehabilitation needs   Additional Comments  The patient's raw score on the -PAC Daily Activity Inpatient Short Form is 21  . A raw score of greater than or equal to 19 suggests the patient may benefit from discharge to home. Please refer to the recommendation of the Occupational Therapist for safe discharge planning.   AM-PAC Daily Activity Inpatient   Lower Body Dressing 3   Bathing 3   Toileting 3   Upper Body Dressing 4   Grooming 4   Eating 4   Daily Activity Raw Score 21   Daily Activity Standardized Score (Calc for Raw Score >=11) 44.27   AM-PAC Applied Cognition Inpatient   Following a Speech/Presentation 4   Understanding Ordinary Conversation 4   Taking Medications 4   Remembering Where Things Are Placed or Put Away 4   Remembering List of 4-5 Errands 4   Taking Care of Complicated Tasks 4   Applied Cognition Raw Score 24   Applied Cognition Standardized Score 62.21   End of Consult   Education Provided Yes;Family or social support of family present for education by provider   Patient Position at End of Consult Seated edge of bed;All needs within reach   Nurse Communication Nurse aware of consult   End of Consult Comments Pt seated EOB at end of session. Call bell and phone within reach. All needs met and pt reports no further questions for OT at this time.   Poppy Jordan, OT

## 2024-02-29 NOTE — PHYSICAL THERAPY NOTE
PHYSICAL THERAPY EVALUATION    NAME:  Clint Fierro III  DATE: 02/29/24    AGE:   54 y.o.  Mrn:   [id number]  ADMIT DX:  Post-traumatic osteoarthritis of right knee [M17.31]    Patient Active Problem List   Diagnosis    Class 1 obesity with serious comorbidity and body mass index (BMI) of 34.0 to 34.9 in adult    Post-traumatic osteoarthritis of right knee    Peroneal tendonitis, right    Dyslipidemia    Seasonal allergies    Primary osteoarthritis of left knee    Patellar tendinitis of left knee    Bilateral lower extremity edema       Past Medical History:   Diagnosis Date    Hearing loss, left     Hyperlipidemia     Osteoarthritis     right knee    Pain in right knee     Pneumonia     Seasonal allergies        Past Surgical History:   Procedure Laterality Date    COLONOSCOPY      FRACTURE SURGERY      right humerus    KNEE ARTHROSCOPY Bilateral     debridement    ROTATOR CUFF REPAIR Right     labrum, rotator cuff and bicep tendon repairs    SHOULDER SURGERY Right 2014       Imaging Studies:  XR knee right 1 or 2 views    (Results Pending)       Past Medical History:   Diagnosis Date    Hearing loss, left     Hyperlipidemia     Osteoarthritis     right knee    Pain in right knee     Pneumonia     Seasonal allergies      Length Of Stay: 0  Performed at least 2 patient identifiers during session: Name and Birthday  PHYSICAL THERAPY EVALUATION :        02/29/24 1553   PT Last Visit   PT Visit Date 02/29/24   Note Type   Note type Evaluation  (and treatment)   Additional Comments Pt greeted supine in bed.   Pain Assessment   Pain Assessment Tool 0-10   Pain Score 10 - Worst Possible Pain  (2/10 at rest; 10/10)   Pain Location/Orientation Orientation: Right;Location: Knee;Other (Comment)  (quadriciep)   Effect of Pain on Daily Activities limits mobility   Hospital Pain Intervention(s) Repositioned;Ambulation/increased activity   Restrictions/Precautions   Weight Bearing Precautions Per Order Yes   RLE Weight Bearing  "Per Order WBAT   Other Precautions Multiple lines;Telemetry;WBS;Fall Risk;Pain   Home Living   Type of Home House   Home Layout Multi-level;Stairs to enter with rails;1/2 bath on main level  (1+1 SERGE no railing, 9+4 steps to 2nd floor railing L side)   Bathroom Shower/Tub Walk-in shower   Bathroom Toilet Standard   Bathroom Equipment Built-in shower seat   Bathroom Accessibility Accessible   Home Equipment Walker;Crutches   Additional Comments Lives with spouse and daughter who are able to assist upon d/c. 3 level home (basement with no need), 1+1 SERGE without rail able to fit RW, 1st floor setup with couch and 1/2 bathroom. 9+4 up to bedroom L rail.   Prior Function   Level of [place] Independent with ADLs;Independent with functional mobility;Independent with IADLS   Lives With Spouse;Daughter   Receives Help From Family   IADLs Independent with driving;Independent with medication management;Independent with meal prep   Falls in the last 6 months 0   Vocational Full time employment   Comments used knee brace for 3 years; PTA, pt reports functioning at independent for functional transfers/amb with no assistive device/ADLs/IADLs. (+) Drive   General   Additional Pertinent History primary OA R knee, now POD0 s/p R TKA (Dr. Delgado DOS 2/29/24), WBAT BLE. Significant pmhx per chart: obesity, post-traumatic OA R knee, dyslipidemia, primary OA L knee, patellar tendinitis L knee, L hearing loss.   Family/Caregiver Present Yes  (wife)   Cognition   Overall Cognitive Status WFL   Arousal/Participation Alert   Orientation Level Oriented X4   Following Commands Follows all commands and directions without difficulty   Subjective   Subjective \"The quad is giving me hell...Wow.\"   RUE Assessment   RUE Assessment   (Refer to OT)   LUE Assessment   LUE Assessment   (Refer to OT)   RLE Assessment   RLE Assessment X  (R hip flx at least 3/5 full range against gravity)   RLE Overall AROM   R Knee Flexion 85   R Knee Extension -30 " "  Strength RLE   R Ankle Dorsiflexion 5/5   R Ankle Plantar Flexion 5/5   LLE Assessment   LLE Assessment WFL  (5/5 throughout,)   Coordination   Sensation X  (Reporting numbness/tingling S1 dermatome of lateral plantar aspect of foot.)   Light Touch   RLE Light Touch Impaired  (Reporting numbness/tingling S1 dermatome of lateral plantar aspect of foot.)   LLE Light Touch Grossly intact   Bed Mobility   Supine to Sit 5  Supervision   Additional items Increased time required;Verbal cues;HOB elevated   Additional Comments HOB ~30. Cued for safe technique/leg over leg   Transfers   Sit to Stand 5  Supervision   Additional items Increased time required;Verbal cues   Stand to Sit 5  Supervision   Additional items Increased time required;Verbal cues   Car transfer   (educated on car transfer technique)   Additional Comments RW. Cued for safe technique/hand placement. (+) Inc R knee pain to 10/10 with inc flx, (-)buckle   Ambulation/Elevation   Gait pattern Improper Weight shift;Antalgic;Wide LEONARDA;Forward Flexion;Decreased foot clearance;Decreased R stance;Inconsistent xi;Excessively slow;Short stride;Step to;Decreased heel strike;Decreased toe off   Gait Assistance 5  Supervision   Additional items Assist x 1;Verbal cues   Assistive Device Rolling walker   Distance 40ft x1   Curbs 1x8\"  (forward with RW. (+) R knee buckle with PT providing knee block. Min Ax1 for stability/knee block)   Ambulation/Elevation Additional Comments semi-steady, step to patterning. (+) Inc R knee flx throughout gait. Cued for safe technique/proper sequencing/gait mechanics. (+) R knee buckle with curb step. Dec mechanics with fatigue/pain   Balance   Static Sitting Good   Dynamic Sitting Fair +   Static Standing Fair   Dynamic Standing Fair -   Ambulatory Fair -  (RW)   Endurance Deficit   Endurance Deficit Yes   Endurance Deficit Description fatigue, inc pain with mobility, dec gait mechanics with fatigue   Activity Tolerance   Activity " Tolerance Patient limited by fatigue;Patient limited by pain  (BP Supine: 143/77. BP EOB: 124/77. Standin/77. Post ambulation: 131/77)   Medical Staff Made Aware JOSE Mcwilliams   Nurse Made Aware LESTER Garcia cleared/updated   Assessment   Prognosis Good   Problem List Decreased strength;Decreased range of motion;Decreased endurance;Impaired balance;Decreased mobility;Decreased skin integrity;Orthopedic restrictions;Impaired sensation;Pain;Obesity   Assessment Pt is a 54 y.o. male y/o presenting to [facility]  on 2024 for elective surgical management of primary OA R knee, now POD0 s/p R TKA (Dr. Delgado DOS 24), WBAT BLE. Significant pmhx per chart: obesity, post-traumatic OA R knee, dyslipidemia, primary OA L knee, patellar tendinitis L knee, L hearing loss. PT consulted to assess strength/functional mobility, activity tolerance and d/c needs. Active PT orders and activity orders for Up with assistance, Activity Beginning POD0 . PTA, pt reports functioning at independent for functional transfers/amb with no assistive device/ADLs/IADLs. Pt greeted supine in bed. During PT IE, pt presenting with above (see flowsheet) outlined functional impairments and deficits that limit functional mobility and activity tolerance relative to baseline. Pt currently requires supervision assistance x1 for bed mobility, supervision assistance x1 for transfers, supervision assistance x1 for ambulation with Rolling Walker, and minimum assistance x1 for elevations. Pt currently demonstrating inc fall risk 2/2 impaired balance, use of ambulatory aid, varying levels of pain, acuity of medical illness, WBS, (+) R knee buckle.  Fall risk education provided with good understanding. VSS. Pt most limited 2/2 inc R quadricep pain to 10/10 with mobility, which pt reports is acute on chronic 2/2 previous injury. Denied additional sxs throughout session. Recommend continued mobilization of pt with nsg staff as  tolerated to prevent further decline in function.  Pt will benefit from continued PT services to progress mobility independence necessary for return to PLOF. Based on pt presentation and impairments, pt would most appropriately benefit from d/c to home with level III (min) rehab intensity resources , support of family/spouse , and Pt demonstrated ability to complete functional mobility required to for safe d/c to home at supervision-minAx1 level.  when medically appropriate.  Pt seen for additional tx session following IE. Refer to assessment below.   Barriers to Discharge None   Goals   Patient Goals Get back to being active.   STG Expiration Date 03/07/24   Short Term Goal #1 1).  Pt will perform bed mobility with Kay demonstrating appropriate technique 100% of the time in order to improve function.2)  Perform all transfers with Kay demonstrating safe and appropriate technique 100% of the time in order to improve ability to negotiate safely in home environment.3) Amb with least restrictive AD > 200'x1 with mod I in order to demonstrate ability to negotiate in home environment.4)  Improve overall strength and balance 1/2 grade in order to optimize ability to perform functional tasks and reduce fall risk.5) Increase activity tolerance to 45 minutes in order to improve endurance to functional tasks. 6)  Negotiate stairs using most appropriate technique and mod I in order to be able to negotiate safely in home environment. 7) PT for ongoing patient and family/caregiver education, DME needs and d/c planning in order to promote highest level of function in least restrictive environment.   PT Treatment Day 0   Plan   Treatment/Interventions Functional transfer training;LE strengthening/ROM;Elevations;Therapeutic exercise;Patient/family training;Equipment eval/education;Bed mobility;Compensatory technique education;Gait training;Spoke to nursing;OT;Family;Endurance training   PT Frequency Twice a day  (PRN)   Discharge  Recommendation   Rehab Resource Intensity Level, PT III (Minimum Resource Intensity)  (OPPT as scheduled)   Equipment Recommended Walker  (pt has)   Additional Comments The patient's AM-PAC Basic Mobility Inpatient Short Form Raw Score is 18. A Raw score of greater than 16 suggests the patient may benefit from discharge to home. Please also refer to the recommendation of the Physical Therapist for safe discharge planning.   AM-PAC Basic Mobility Inpatient   Turning in Flat Bed Without Bedrails 3   Lying on Back to Sitting on Edge of Flat Bed Without Bedrails 3   Moving Bed to Chair 3   Standing Up From Chair Using Arms 3   Walk in Room 3   Climb 3-5 Stairs With Railing 3   Basic Mobility Inpatient Raw Score 18   Basic Mobility Standardized Score 41.05   Highest Level Of Mobility   JH-HLM Goal 6: Walk 10 steps or more   JH-HLM Achieved 7: Walk 25 feet or more   Additional Treatment Session   Start Time 1327   End Time 1353   Treatment Assessment Following IE, additional tx session performed with focus on gait training with RW, stair negotiation, pt/family education on safe guarding technique/hand held assistance for stair negotiation, and HEP and educational handout provided and reviewed per PT POC.  Pt with fair tolerance to session with limitations of fatigue, weakness, inc pain, and dec endurance and activity tolerance. Pt demonstrating progress towards functional goals with ability to complete functional mobility at mod I-supervision level required for safe d/c to home. Skilled cues for technique/safety and instruction on stair negotiation backwards without knee buckle and improved pt confidence and R quadricep pain. Pt most limited 2/2 fatigue. Denies reports of dizziness or SOB t/o session. Please refer to flowsheet for objective data above. Pt continues to demonstrate deficits relative to PLOF and would benefit from continued skilled PT services per POC to improve indep and safety with mobility. PT d/c  recommendations: Anticipate d/c to home with level III (min) rehab intensity resources , support of family/spouse , and Pt expresses no c/f d/c to home at this time.  at d/c when medically appropriate. [facility] 19.   Equipment Use 2x8 inch curb step backwards initially CGA progressed to supervision. (-) knee buckle. Progressed to steady step to patterning with RW, no gross LOB 45ft x1. Multiple transfers completed progressing to mod I. HEP and educational handout provided and reviewed.   Additional Treatment Day 1   End of Consult   Patient Position at End of Consult Seated edge of bed;All needs within reach  (Wife in room. Pt stable. RN aware.)   End of Consult Comments Cleared for safe d/c to home.       Pt requires PT/OT co-eval due to medical complexity, safety concerns, fall risk, dec activity tolerance    (Please find full objective findings from PT assessment regarding body systems outlined above).     Hx/personal factors: co-morbidities, inaccessible home, mutliple lines, telemetry, use of AD, pain, WB restrictions, recent orthopedic procedure , fall risk, and obesity  Examination: dec mobility, dec balance, dec endurance, dec amb, risk for falls, pain, WB restrictions, impairements in locomotion, musculoskeletal, balance, endurance, posture, coordination, assessed/impairments of systems including multiple body structures involved; musculoskeletal (ROM, strength, posture, BMI, pain), neuromuscular (balance,locomotion, gait, transfers, motor control and learning, sensation), joint integrity, integumentary (skin integrity, presence of scars or wounds, post-surgical incision), cardiopulmonary (vitals, edema), assessed cognition; activity limitations (difficulties executing an action); participation restrictions (problems associated w involvement in life situations), am-pac score suggesting inc supervision/assistance vs baseline, use of more restrictive AD.   Clinical: unpredictable (risk for falls, POD #0, and  pain mgt)  Complexity: high     Tom Mora, PT,DPT   02/29/24

## 2024-02-29 NOTE — ANESTHESIA PROCEDURE NOTES
Peripheral Block    Patient location during procedure: holding area  Start time: 2/29/2024 9:22 AM  Reason for block: at surgeon's request and post-op pain management  Staffing  Performed by: Pa Nguyen MD  Authorized by: Pa Nguyen MD    Preanesthetic Checklist  Completed: patient identified, IV checked, site marked, risks and benefits discussed, surgical consent, monitors and equipment checked, pre-op evaluation and timeout performed  Peripheral Block  Patient position: left lateral  Prep: ChloraPrep  Patient monitoring: frequent blood pressure checks, continuous pulse oximetry and heart rate  Block type: IPACK  Laterality: right  Injection technique: single-shot  Procedures: ultrasound guided, Ultrasound guidance required for the procedure to increase accuracy and safety of medication placement and decrease risk of complications.  Ultrasound permanent image savedbupivacaine (PF) (MARCAINE) 0.5 % injection 20 mL - Perineural   20 mL - 2/29/2024 9:22:00 AM  bupivacaine liposomal (EXPAREL) 1.3 % injection 20 mL - Perineural   5 mL - 2/29/2024 9:22:00 AM  Needle  Needle type: Stimuplex   Needle localization: anatomical landmarks and ultrasound guidance  Assessment  Injection assessment: incremental injection, frequent aspiration, injected with ease, negative aspiration, negative for heart rate change, no paresthesia on injection, no symptoms of intraneural/intravenous injection and needle tip visualized at all times  Paresthesia pain: none  Post-procedure:  site cleaned  patient tolerated the procedure well with no immediate complications

## 2024-02-29 NOTE — INTERVAL H&P NOTE
H&P reviewed. After examining the patient I find no changes in the patients condition since the H&P had been written. Plan for right TKA.

## 2024-02-29 NOTE — PLAN OF CARE
Problem: PHYSICAL THERAPY ADULT  Goal: Performs mobility at highest level of function for planned discharge setting.  See evaluation for individualized goals.  Description: Treatment/Interventions: Functional transfer training, LE strengthening/ROM, Elevations, Therapeutic exercise, Patient/family training, Equipment eval/education, Bed mobility, Compensatory technique education, Gait training, Spoke to nursing, OT, Family, Endurance training  Equipment Recommended: Walker (pt has)       See flowsheet documentation for full assessment, interventions and recommendations.  2/29/2024 1814 by Tom Mora, PT  Note: Prognosis: Good  Problem List: Decreased strength, Decreased range of motion, Decreased endurance, Impaired balance, Decreased mobility, Decreased skin integrity, Orthopedic restrictions, Impaired sensation, Pain, Obesity  Assessment: Pt is a 54 y.o. male y/o presenting to [facility]  on 2/29/2024 for elective surgical management of primary OA R knee, now POD0 s/p R TKA (Dr. Delgado DOS 2/29/24), WBAT BLE. Significant pmhx per chart: obesity, post-traumatic OA R knee, dyslipidemia, primary OA L knee, patellar tendinitis L knee, L hearing loss. PT consulted to assess strength/functional mobility, activity tolerance and d/c needs. Active PT orders and activity orders for Up with assistance, Activity Beginning POD0 . PTA, pt reports functioning at independent for functional transfers/amb with no assistive device/ADLs/IADLs. Pt greeted supine in bed. During PT IE, pt presenting with above (see flowsheet) outlined functional impairments and deficits that limit functional mobility and activity tolerance relative to baseline. Pt currently requires supervision assistance x1 for bed mobility, supervision assistance x1 for transfers, supervision assistance x1 for ambulation with Rolling Walker, and minimum assistance x1 for elevations. Pt currently demonstrating inc fall risk 2/2  impaired balance, use of ambulatory aid, varying levels of pain, acuity of medical illness, WBS, (+) R knee buckle.  Fall risk education provided with good understanding. VSS. Pt most limited 2/2 inc R quadricep pain to 10/10 with mobility, which pt reports is acute on chronic 2/2 previous injury. Denied additional sxs throughout session. Recommend continued mobilization of pt with nsg staff as tolerated to prevent further decline in function.  Pt will benefit from continued PT services to progress mobility independence necessary for return to PLOF. Based on pt presentation and impairments, pt would most appropriately benefit from d/c to home with level III (min) rehab intensity resources , support of family/spouse , and Pt demonstrated ability to complete functional mobility required to for safe d/c to home at supervision-minAx1 level.  when medically appropriate.  Pt seen for additional tx session following IE. Refer to assessment below.  Barriers to Discharge: None     Rehab Resource Intensity Level, PT: III (Minimum Resource Intensity) (OPPT as scheduled)    See flowsheet documentation for full assessment.     2/29/2024 1814 by Tom Mora, PT  Note: Prognosis: Good  Problem List: Decreased strength, Decreased range of motion, Decreased endurance, Impaired balance, Decreased mobility, Decreased skin integrity, Orthopedic restrictions, Impaired sensation, Pain, Obesity  Assessment: Pt is a 54 y.o. male y/o presenting to [facility]  on 2/29/2024 for elective surgical management of primary OA R knee, now POD0 s/p R TKA (Dr. Delgado DOS 2/29/24), WBAT BLE. Significant pmhx per chart: obesity, post-traumatic OA R knee, dyslipidemia, primary OA L knee, patellar tendinitis L knee, L hearing loss. PT consulted to assess strength/functional mobility, activity tolerance and d/c needs. Active PT orders and activity orders for Up with assistance, Activity Beginning POD0 . PTA, pt reports  functioning at independent for functional transfers/amb with no assistive device/ADLs/IADLs. Pt greeted supine in bed. During PT IE, pt presenting with above (see flowsheet) outlined functional impairments and deficits that limit functional mobility and activity tolerance relative to baseline. Pt currently requires supervision assistance x1 for bed mobility, supervision assistance x1 for transfers, supervision assistance x1 for ambulation with Rolling Walker, and minimum assistance x1 for elevations. Pt currently demonstrating inc fall risk 2/2 impaired balance, use of ambulatory aid, varying levels of pain, acuity of medical illness, WBS, (+) R knee buckle.  Fall risk education provided with good understanding. VSS. Pt most limited 2/2 inc R quadricep pain to 10/10 with mobility, which pt reports is acute on chronic 2/2 previous injury. Denied additional sxs throughout session. Recommend continued mobilization of pt with nsg staff as tolerated to prevent further decline in function.  Pt will benefit from continued PT services to progress mobility independence necessary for return to PLOF. Based on pt presentation and impairments, pt would most appropriately benefit from d/c to home with level III (min) rehab intensity resources , support of family/spouse , and Pt demonstrated ability to complete functional mobility required to for safe d/c to home at supervision-minAx1 level.  when medically appropriate.  Pt seen for additional tx session following IE. Refer to assessment below.  Barriers to Discharge: None     Rehab Resource Intensity Level, PT: III (Minimum Resource Intensity) (OPPT as scheduled)    See flowsheet documentation for full assessment.

## 2024-02-29 NOTE — ANESTHESIA POSTPROCEDURE EVALUATION
Post-Op Assessment Note    CV Status:  Stable  Pain Score: 0    Pain management: adequate       Mental Status:  Alert and awake   Hydration Status:  Euvolemic   PONV Controlled:  Controlled   Airway Patency:  Patent     Post Op Vitals Reviewed: Yes    No anethesia notable event occurred.    Staff: CRNA               BP   97/63   Temp   98.4   Pulse  78   Resp   12   SpO2   97

## 2024-02-29 NOTE — ANESTHESIA PROCEDURE NOTES
Spinal Block    Patient location during procedure: OR  Start time: 2/29/2024 10:16 AM  Reason for block: primary anesthetic  Staffing  Performed by: Nita Tesfaye CRNA  Authorized by: Pa Nguyen MD    Preanesthetic Checklist  Completed: patient identified, IV checked, site marked, risks and benefits discussed, surgical consent, monitors and equipment checked, pre-op evaluation and timeout performed  Spinal Block  Patient position: sitting  Prep: ChloraPrep  Patient monitoring: heart rate, cardiac monitor, continuous pulse ox and frequent blood pressure checks  Approach: midline  Location: L3-4  Injection technique: single-shot  Needle  Needle type: pencil-tip   Needle gauge: 25 G  Needle length: 10 cm  Assessment  Sensory level: T4  Injection Assessment:  negative aspiration for heme, no paresthesia on injection and positive aspiration for clear CSF.  Post-procedure:  site cleaned

## 2024-03-01 ENCOUNTER — TELEPHONE (OUTPATIENT)
Dept: OBGYN CLINIC | Facility: HOSPITAL | Age: 54
End: 2024-03-01

## 2024-03-01 NOTE — TELEPHONE ENCOUNTER
"Patient contacted for a postoperative follow up assessment. Patient reports doing \"very well.\" Pt confirms he is using RW. Patient denies increase in swelling and dressing is clean, dry and intact. Patient is icing the site regularly.     Pt states he received coverage denial for the surgery from insurance company dated 2/23/24. Will route.     Confirmed upcoming appointments with patient:   PT: 3/14  Postop: 3/15    We reviewed patients AVS medication list. Patient is taking Tylenol 1000mg every 8 hours, Duricef 500mg every 12 hours x5 days, Celebrex 200mg BID, Zofran 4mg PRN, Oxycodone 5mg PRN, ASA 81mg BID. Patient has not yet had a BM but states \"is not eating as much.\" Discussed postop constipation, using Senokot 8.6-50mg daily as prescribed while taking Oxycodone, increasing fluids/fiber/prunes/prune juice. Pt confirms he has regular BM through diet.     Patient denies nausea, vomiting, abdominal pain, chest pain, shortness of breath, fever, dizziness and calf pain. Patient does not have any other questions or concerns at this time. Pt was encouraged to call with any questions, concerns or issues.    "

## 2024-03-01 NOTE — TELEPHONE ENCOUNTER
LM, attempted Postoperative Follow-up Assessment (Ortho Nurse Navigator)    I will STOP taking the medications listed below when I get home from the hospital:  None

## 2024-03-05 ENCOUNTER — TELEPHONE (OUTPATIENT)
Dept: OBGYN CLINIC | Facility: HOSPITAL | Age: 54
End: 2024-03-05

## 2024-03-05 NOTE — TELEPHONE ENCOUNTER
Spoke to patient, discussed S/S of infection to monitor for and temperatures above 100.4.  pt encouraged to call me with questions, concerns or issues.

## 2024-03-05 NOTE — TELEPHONE ENCOUNTER
Caller: Patient    Doctor: Danny    Reason for call: Patient had a right TKR on 2/29/24 with Dr. Delgado.  Patient was told to call if he had any fever (99.8).  No warmth or redness.  Please advise.    Call back#: [phone]

## 2024-03-14 ENCOUNTER — OFFICE VISIT (OUTPATIENT)
Dept: PHYSICAL THERAPY | Facility: CLINIC | Age: 54
End: 2024-03-14
Payer: COMMERCIAL

## 2024-03-14 DIAGNOSIS — Z96.651 HISTORY OF TOTAL RIGHT KNEE REPLACEMENT: Primary | ICD-10-CM

## 2024-03-14 DIAGNOSIS — M17.11 LOCALIZED OSTEOARTHRITIS OF RIGHT KNEE: ICD-10-CM

## 2024-03-14 PROCEDURE — 97110 THERAPEUTIC EXERCISES: CPT | Performed by: PHYSICAL THERAPIST

## 2024-03-14 PROCEDURE — 97164 PT RE-EVAL EST PLAN CARE: CPT | Performed by: PHYSICAL THERAPIST

## 2024-03-14 PROCEDURE — 97140 MANUAL THERAPY 1/> REGIONS: CPT | Performed by: PHYSICAL THERAPIST

## 2024-03-14 NOTE — PROGRESS NOTES
PT Evaluation     Today's date: 3/14/2024  Patient name: Clint Fierro III  : 1970  MRN: [id number]  Referring provider: Christiano Delgado, *  Dx:   Encounter Diagnosis     ICD-10-CM    1. History of total right knee replacement  Z96.651       2. Localized osteoarthritis of right knee  M17.11                        Assessment  Assessment details: Clint Fierro III is a 54 y.o. male presenting to outpatient physical therapy at [facility] with complaints of R knee pain.  He presents with decreased R>L knee range of motion, decreased B LE strength, limited flexibility, altered gait pattern, poor balance, decreased tolerance to activity and decreased functional mobility following R TKA on 24 due to Post-traumatic osteoarthritis of right knee.  He would benefit from skilled PT services in order to address these deficits and reach maximum level of function.  Thank you for the referral!  Impairments: abnormal gait, abnormal or restricted ROM, activity intolerance, impaired balance, impaired physical strength, lacks appropriate home exercise program and pain with function  Barriers to therapy: None  Understanding of Dx/Px/POC: excellent  Goals  ST.  Independent with HEP in 2 weeks  2.  Increase R knee PROM to 0-100 degrees in 4 weeks   3.  Normal gait pattern with SPC for community distances in 4 weeks    LT.  Achieve FOTO score of 67/100 in 8 weeks   2.  Able to ambulate on all surfaces safely x 20 min in 8 weeks  3.  Strength = 5/5 all R LE motions in 8 weeks  4.  No tightness in R thigh in 8 weeks  5.  Excellent R LE balance in 8 weeks  6.  No R LE swelling in 8 weeks  7.  RTW full duty in 8 weeks    Plan  Patient would benefit from: skilled PT and PT eval  Planned modality interventions: cryotherapy, electrical stimulation/Russian stimulation, TENS and thermotherapy: hydrocollator packs  Planned therapy interventions: ADL retraining, balance/weight bearing training, flexibility,  functional ROM exercises, home exercise program, joint mobilization, manual therapy, neuromuscular re-education, postural training, strengthening, stretching, therapeutic activities, therapeutic exercise and gait training  Frequency: 2x week  Duration in weeks: 8  Treatment plan discussed with: patient        Subjective Evaluation    History of Present Illness  Mechanism of injury: Pt reports having R>L knee pain due to OA.  He received cortisone and visco injections into his R knee.  His last visco series was about 1 year ago with no improvement in his symptoms.  He tried oral NSAIDs as well as Tylenol with only minimal relief of symptoms.  Pain was localized to the medial aspect of his R knee but also posterior-lateral at times.  His pain was limiting his activities he enjoys such as hiking and walking.  It was also affecting his job as he is very active and walks a lot for his job and he struggles with pain after working.  On 24 he had R TKA.   Hoping to have L TKA in 2024.  Wants to RTW - sedentary job on 3/18/24.  Not sleeping well due to R thigh > knee pain.           Recurrent probem    Quality of life: fair    Patient Goals  Patient goals for therapy: increased strength, independence with ADLs/IADLs, return to sport/leisure activities, return to work, increased motion, improved balance, decreased pain and decreased edema    Pain  Current pain ratin  At best pain ratin  At worst pain ratin  Quality: tight, sharp and dull ache  Relieving factors: rest, ice and medications  Aggravating factors: stair climbing, walking, standing and lifting  Progression: worsening    Social Support  Lives with: spouse    Employment status: not working  Treatments  Previous treatment: medication, injection treatment and physical therapy  Current treatment: medication        Objective     Observations   Left Knee   Positive for effusion.     Right Knee   Positive for effusion.     Additional Observation  "Details  5 cm swelling R knee > L.     Palpation     Additional Palpation Details  Severe tightness B H/S.    Tenderness   Left Knee   Tenderness in the lateral joint line and medial joint line.     Right Knee   Tenderness in the medial joint line.     Neurological Testing     Additional Neurological Details  Chronic numbness B lateral thighs.    Active Range of Motion   Left Knee   Flexion: 109 degrees   Extension: -8 degrees   Extensor la degrees     Right Knee   Flexion: 53 degrees with pain  Extension: -23 degrees with pain  Extensor la degrees     Passive Range of Motion   Left Knee   Flexion: 110 degrees with pain  Extension: -6 degrees     Right Knee   Flexion: 71 degrees with pain  Extension: -13 degrees with pain    Strength/Myotome Testing     Left Hip   Planes of Motion   Flexion: 4+    Right Hip   Planes of Motion   Flexion: 4-    Left Knee   Flexion: 4+  Extension: 4  Quadriceps contraction: fair    Right Knee   Flexion: 2+  Extension: 2+  Quadriceps contraction: poor    Ambulation     Ambulation: Level Surfaces   Ambulation with assistive device: independent  Ambulation without assistive device: minimum assist    Ambulation: Stairs   Ascend stairs: unable  Descend stairs: unable  Curbs: unable    Observational Gait   Decreased walking speed and right stance time.     Additional Observational Gait Details  Mod genu varus L.     Comments   Poor L LE balance.  Unable to balance on R LE.      Flowsheet Rows      Flowsheet Row Most Recent Value   PT/OT G-Codes    Current Score 38   Projected Score 67          POC EXPIRES On:  24  PRECAUTIONS:  None  CO-MORBIDITES:  None  PERSONAL FACTORS:  L TKA on 24  Access Code:  [email or web address]      Manuals HEP 3/14           PROM R knee flex/ext  10'           R H/S stretch  2'                                     Neuro Re-Ed                                                                                                Ther Ex    Prone hangs R 3/14 30\" x3     " "      Quad sets R 3/14 5\" 10           Supine strap H/S stretch R 3/14 10\" 10            Heel slides with strap R 3/14 10\" 10           R LAQ 3/14 10           Seated R knee flex 3/14 5\" 10           Ankle pumps R 3/14 10                        Ther Activity                              Gait Training                              Modalities    CP R knee                                  "

## 2024-03-15 ENCOUNTER — APPOINTMENT (OUTPATIENT)
Dept: RADIOLOGY | Facility: CLINIC | Age: 54
End: 2024-03-15
Payer: COMMERCIAL

## 2024-03-15 ENCOUNTER — OFFICE VISIT (OUTPATIENT)
Dept: OBGYN CLINIC | Facility: CLINIC | Age: 54
End: 2024-03-15

## 2024-03-15 VITALS
DIASTOLIC BLOOD PRESSURE: 83 MMHG | BODY MASS INDEX: 36.16 KG/M2 | WEIGHT: 267 LBS | HEIGHT: 72 IN | SYSTOLIC BLOOD PRESSURE: 122 MMHG | HEART RATE: 80 BPM

## 2024-03-15 DIAGNOSIS — M17.31 POST-TRAUMATIC OSTEOARTHRITIS OF RIGHT KNEE: ICD-10-CM

## 2024-03-15 DIAGNOSIS — Z47.1 AFTERCARE FOLLOWING RIGHT KNEE JOINT REPLACEMENT SURGERY: Primary | ICD-10-CM

## 2024-03-15 DIAGNOSIS — Z96.651 AFTERCARE FOLLOWING RIGHT KNEE JOINT REPLACEMENT SURGERY: ICD-10-CM

## 2024-03-15 DIAGNOSIS — Z47.1 AFTERCARE FOLLOWING RIGHT KNEE JOINT REPLACEMENT SURGERY: ICD-10-CM

## 2024-03-15 DIAGNOSIS — Z96.651 AFTERCARE FOLLOWING RIGHT KNEE JOINT REPLACEMENT SURGERY: Primary | ICD-10-CM

## 2024-03-15 PROCEDURE — 73562 X-RAY EXAM OF KNEE 3: CPT

## 2024-03-15 PROCEDURE — 99024 POSTOP FOLLOW-UP VISIT: CPT | Performed by: STUDENT IN AN ORGANIZED HEALTH CARE EDUCATION/TRAINING PROGRAM

## 2024-03-15 NOTE — PROGRESS NOTES
Subjective:Patient seen and examined. Patient is s/p right total knee arthroplasty 2/29/24. He is using crutchs to ambulate. Initial PT visit a few days ago. Pain relatively controlled. Post-op dressing intact. Progressing well. Incision without drainage. Denies fevers or chills    Physical Exam:  Incision: clean, dry, intact with no evidence of drainage or infection  ROM: 3-80  5/5 IP/Q/HS/TA/GS, 2+ DP/PT, SILT DP/SP/S/S/TN    XR right knee: press-fit attune CR total knee arthroplasty, components in good position. No fracture or dislocation.     Assessment/Plan:   s/p right total knee replacement 2/29/24. Imaging and prognosis reviewed with patient. Patient will continue with physical therapy, HEP, daily stretching. Wean off crutches with help of PT. Continue with 81 mg ASA per DVT prophylaxis. OTC anti inflammatories for pain control.     - continue multi-modal pain control   - Weight bearing status: WBAT  - DVT ppx: 81 mg ASA 4 weeks  - Incision care: wash area with warm soapy water and do not scrub incision, do not submerge until incision completely healed.   - PT/OT  - F/U 4 weeks     Scribe Attestation      I,:  James Reddy am acting as a scribe while in the presence of the attending physician.:       I,:  Christiano Delgado DO personally performed the services described in this documentation    as scribed in my presence.:

## 2024-03-16 RX ORDER — ACETAMINOPHEN 500 MG
1000 TABLET ORAL EVERY 8 HOURS
Qty: 180 TABLET | Refills: 3 | Status: SHIPPED | OUTPATIENT
Start: 2024-03-16

## 2024-03-16 RX ORDER — OXYCODONE HYDROCHLORIDE 5 MG/1
5 TABLET ORAL EVERY 4 HOURS PRN
Qty: 42 TABLET | Refills: 0 | Status: SHIPPED | OUTPATIENT
Start: 2024-03-16

## 2024-03-19 ENCOUNTER — OFFICE VISIT (OUTPATIENT)
Dept: PHYSICAL THERAPY | Facility: CLINIC | Age: 54
End: 2024-03-19
Payer: COMMERCIAL

## 2024-03-19 ENCOUNTER — APPOINTMENT (OUTPATIENT)
Dept: PHYSICAL THERAPY | Facility: CLINIC | Age: 54
End: 2024-03-19
Payer: COMMERCIAL

## 2024-03-19 DIAGNOSIS — Z96.651 HISTORY OF TOTAL RIGHT KNEE REPLACEMENT: Primary | ICD-10-CM

## 2024-03-19 DIAGNOSIS — M17.11 LOCALIZED OSTEOARTHRITIS OF RIGHT KNEE: ICD-10-CM

## 2024-03-19 PROCEDURE — 97140 MANUAL THERAPY 1/> REGIONS: CPT

## 2024-03-19 PROCEDURE — 97110 THERAPEUTIC EXERCISES: CPT

## 2024-03-19 PROCEDURE — 97016 VASOPNEUMATIC DEVICE THERAPY: CPT

## 2024-03-19 NOTE — PROGRESS NOTES
"Daily Note     Today's date: 3/19/2024  Patient name: Clint Fierro III  : 1970  MRN: [id number]  Referring provider: Marjorie Miles PA-C  Dx:   Encounter Diagnosis     ICD-10-CM    1. History of total right knee replacement  Z96.651       2. Localized osteoarthritis of right knee  M17.11                      Subjective:  Patient states he worked for 2 days and is having increase in swelling, experiencing a lot of pain.       Objective: See treatment diary below      Assessment: Tolerated treatment fair. Patient was limited this session secondary to subjective. Cleared with treating therapist to provide lymph compression. He had a good response.       Plan: Continue per plan of care.     POC EXPIRES On:  24  PRECAUTIONS:  None  CO-MORBIDITES:  None  PERSONAL FACTORS:  L TKA on 24  Access Code:  [email or web address]      Manuals HEP 3/14 3/19           PROM R knee flex/ext  10' 10'          R H/S stretch  2'                                     Neuro Re-Ed                                                                                                Ther Ex    Prone hangs R 3/14 30\" x3 30\"x 3           Quad sets R 3/14 5\" 10 5\" x 10          Supine strap H/S stretch R 3/14 10\" 10  10\"x10           Heel slides with strap R 3/14 10\" 10 10\"x10           R LAQ 3/14 10 10x          Seated R knee flex 3/14 5\" 10 5\" x 10          Ankle pumps R 3/14 10 10x                        Ther Activity                              Gait Training                              Modalities    CP R knee             Lymph compression   15'                      "

## 2024-03-20 ENCOUNTER — APPOINTMENT (OUTPATIENT)
Dept: PHYSICAL THERAPY | Facility: CLINIC | Age: 54
End: 2024-03-20
Payer: COMMERCIAL

## 2024-03-21 ENCOUNTER — APPOINTMENT (OUTPATIENT)
Dept: PHYSICAL THERAPY | Facility: CLINIC | Age: 54
End: 2024-03-21
Payer: COMMERCIAL

## 2024-03-22 ENCOUNTER — APPOINTMENT (OUTPATIENT)
Dept: PHYSICAL THERAPY | Facility: CLINIC | Age: 54
End: 2024-03-22
Payer: COMMERCIAL

## 2024-03-25 ENCOUNTER — OFFICE VISIT (OUTPATIENT)
Dept: PHYSICAL THERAPY | Facility: CLINIC | Age: 54
End: 2024-03-25
Payer: COMMERCIAL

## 2024-03-25 DIAGNOSIS — M17.11 LOCALIZED OSTEOARTHRITIS OF RIGHT KNEE: ICD-10-CM

## 2024-03-25 DIAGNOSIS — Z96.651 HISTORY OF TOTAL RIGHT KNEE REPLACEMENT: Primary | ICD-10-CM

## 2024-03-25 PROCEDURE — 97016 VASOPNEUMATIC DEVICE THERAPY: CPT

## 2024-03-25 PROCEDURE — 97110 THERAPEUTIC EXERCISES: CPT

## 2024-03-25 PROCEDURE — 97140 MANUAL THERAPY 1/> REGIONS: CPT

## 2024-03-25 NOTE — PROGRESS NOTES
"Daily Note     Today's date: 3/25/2024  Patient name: Clint Fierro III  : 1970  MRN: [id number]  Referring provider: Marjorie Miles PA-C  Dx:   Encounter Diagnosis     ICD-10-CM    1. History of total right knee replacement  Z96.651       2. Localized osteoarthritis of right knee  M17.11                      Subjective: Pt reports resting all weekend and his knee is \"normal\" size.  Reports after a day of work it is so swollen he feels he can not progress his ex's.      Objective: See treatment diary below      Assessment: Tolerated treatment poor. Patient demonstrated fatigue post treatment and would benefit from continued PT for cont'd work on knee ROM, strengthening.  Pt encouraged to work on motion at home.  Pt instructed to use single crutch until his gait improves.        Plan: Continue per plan of care.  Progress treatment as tolerated.       POC EXPIRES On:  24  PRECAUTIONS:  None  CO-MORBIDITES:  None  PERSONAL FACTORS:  L TKA on 24  Access Code:  [email or web address]      Manuals HEP 3/14 3/19  3/25         PROM R knee flex/ext  10' 10' JK         R H/S stretch  2'  jk                          15'         Neuro Re-Ed                                                                                                Ther Ex    Prone hangs R 3/14 30\" x3 30\"x 3           Quad sets R 3/14 5\" 10 5\" x 10 5\"x10         Supine strap H/S stretch R 3/14 10\" 10  10\"x10           Heel slides with strap R 3/14 10\" 10 10\"x10  0n wall 10x10\"         R LAQ 3/14 10 10x SAQ 20x5\"         Seated R knee flex 3/14 5\" 10 5\" x 10          Ankle pumps R 3/14 10 10x           Step stretch    6\" 10x5\"                                                Bike for ROM    5' 1/2 circles         Ther Activity                              Gait Training                              Modalities    CP R knee             Lymph compression   15'  15'                       "

## 2024-03-26 ENCOUNTER — APPOINTMENT (OUTPATIENT)
Dept: PHYSICAL THERAPY | Facility: CLINIC | Age: 54
End: 2024-03-26
Payer: COMMERCIAL

## 2024-03-28 ENCOUNTER — OFFICE VISIT (OUTPATIENT)
Dept: PHYSICAL THERAPY | Facility: CLINIC | Age: 54
End: 2024-03-28
Payer: COMMERCIAL

## 2024-03-28 DIAGNOSIS — M17.11 LOCALIZED OSTEOARTHRITIS OF RIGHT KNEE: ICD-10-CM

## 2024-03-28 DIAGNOSIS — Z96.651 HISTORY OF TOTAL RIGHT KNEE REPLACEMENT: Primary | ICD-10-CM

## 2024-03-28 PROCEDURE — 97110 THERAPEUTIC EXERCISES: CPT

## 2024-03-28 PROCEDURE — 97140 MANUAL THERAPY 1/> REGIONS: CPT

## 2024-03-28 PROCEDURE — 97016 VASOPNEUMATIC DEVICE THERAPY: CPT

## 2024-03-29 ENCOUNTER — TELEPHONE (OUTPATIENT)
Age: 54
End: 2024-03-29

## 2024-03-29 NOTE — TELEPHONE ENCOUNTER
Caller: Patient     Doctor: Danny    Reason for call: Patient states he continues to have left thigh and knee swelling. Right TKA 2/29/24. He states he is taking the antiinflammatory but the swelling is causing him issues with advancing in his PT. Please advise     Call back#: [phone]

## 2024-04-01 ENCOUNTER — OFFICE VISIT (OUTPATIENT)
Dept: PHYSICAL THERAPY | Facility: CLINIC | Age: 54
End: 2024-04-01
Payer: COMMERCIAL

## 2024-04-01 DIAGNOSIS — M17.11 LOCALIZED OSTEOARTHRITIS OF RIGHT KNEE: ICD-10-CM

## 2024-04-01 DIAGNOSIS — Z96.651 HISTORY OF TOTAL RIGHT KNEE REPLACEMENT: Primary | ICD-10-CM

## 2024-04-01 PROCEDURE — 97110 THERAPEUTIC EXERCISES: CPT

## 2024-04-01 PROCEDURE — 97140 MANUAL THERAPY 1/> REGIONS: CPT

## 2024-04-01 NOTE — PROGRESS NOTES
"Daily Note     Today's date: 2024  Patient name: Clint Fierro III  : 1970  MRN: [id number]  Referring provider: Marjorie Miles PA-C  Dx:   Encounter Diagnosis     ICD-10-CM    1. History of total right knee replacement  Z96.651       2. Localized osteoarthritis of right knee  M17.11                      Subjective: Pt reports compliance w/ HEP and performs stretches t/o the work day when sitting too long.  Pt reports the knee feels stiff and \"full\".       Objective: See treatment diary below      Assessment: Tolerated treatment well w/ progression of ex's. Patient demonstrated fatigue post treatment and would benefit from continued PT to work on knee ROM and LE strengthening.  Pt limited in flexion, needing more work.      Plan: Continue per plan of care.  Progress treatment as tolerated.       POC EXPIRES On:  24  PRECAUTIONS:  None  CO-MORBIDITES:  None  PERSONAL FACTORS:  L TKA on 24  Access Code:  [email or web address]      Manuals HEP 3/14 3/19  3/25 3/28 4/1       PROM R knee flex/ext  10' 10' JK 15 JK 10'       R H/S stretch  2'  jk         Ktape scar      JK           15' 15        Neuro Re-Ed                                                                                                Ther Ex    Bike for ROM    5' 1/2 circles 5' 1/2 circles 6' 1/2 circles       Prone hangs R 3/14 30\" x3 30\"x 3           Quad sets R 3/14 5\" 10 5\" x 10 5\"x10 5\" 10 10x10\"       Supine strap H/S stretch R 3/14 10\" 10  10\"x10   10\" 10 10\" 10       Heel slides with strap R 3/14 10\" 10 10\"x10  0n wall 10x10\" 10\" 10 10\" 10       Supine 90/90 with towel     10\" 10        R LAQ 3/14 10 10x SAQ 20x5\"  10x5\"       Seated R knee flex 3/14 5\" 10 5\" x 10          Ankle pumps R 3/14 10 10x           Step stretch    6\" 10x5\" 8\" 10x5\" 8\" 10x5\"       Std SLR x3 dir      2lb 10x2 bl       SLR flex      10x                                 Ther Activity                              Gait Training                            "   Modalities    CP R knee             Lymph compression   15'  15' 10' np

## 2024-04-03 ENCOUNTER — OFFICE VISIT (OUTPATIENT)
Dept: PHYSICAL THERAPY | Facility: CLINIC | Age: 54
End: 2024-04-03
Payer: COMMERCIAL

## 2024-04-03 DIAGNOSIS — Z96.651 HISTORY OF TOTAL RIGHT KNEE REPLACEMENT: Primary | ICD-10-CM

## 2024-04-03 DIAGNOSIS — M17.11 LOCALIZED OSTEOARTHRITIS OF RIGHT KNEE: ICD-10-CM

## 2024-04-03 PROCEDURE — 97140 MANUAL THERAPY 1/> REGIONS: CPT

## 2024-04-03 PROCEDURE — 97110 THERAPEUTIC EXERCISES: CPT

## 2024-04-03 NOTE — PROGRESS NOTES
"Daily Note     Today's date: 4/3/2024  Patient name: Clint Fierro III  : 1970  MRN: [id number]  Referring provider: Marjorie Miles PA-C  Dx:   Encounter Diagnosis     ICD-10-CM    1. History of total right knee replacement  Z96.651       2. Localized osteoarthritis of right knee  M17.11                      Subjective: Pt reports he tripped over his crutches last night when he was getting out of his chair.  [place] like he strained his knee.  Having a increase in swelling this afternoon.       Objective: See treatment diary below      Assessment: Tolerated treatment well w/ progression of ex's. Patient demonstrated fatigue post treatment and would benefit from continued PT to work on knee ROM and LE strengthening.  Pt limited in flexion, needing more work.  Progressed with prone knee flexion stretch with noted improved mobility in superior knee area.        Plan: Continue per plan of care.  Progress treatment as tolerated.       POC EXPIRES On:  24  PRECAUTIONS:  None  CO-MORBIDITES:  None  PERSONAL FACTORS:  L TKA on 24  Access Code:  [email or web address]      Manuals HEP 3/14 3/19  3/25 3/28 4/1 4/3      PROM R knee flex/ext  10' 10' JK 15 JK 10' Ksg 8'      R H/S stretch  2'  jk         Ktape scar      JK           15' 15        Neuro Re-Ed                                                                                                Ther Ex    Bike for ROM    5' 1/2 circles 5' 1/2 circles 6' 1/2 circles 6' rock      Prone hangs R 3/14 30\" x3 30\"x 3           Quad sets R 3/14 5\" 10 5\" x 10 5\"x10 5\" 10 10x10\" 10x10\"      Supine strap H/S stretch R 3/14 10\" 10  10\"x10   10\" 10 10\" 10 10x10\"      Heel slides with strap R 3/14 10\" 10 10\"x10  0n wall 10x10\" 10\" 10 10\" 10 10x10\"      Supine 90/90 with towel     10\" 10        R LAQ 3/14 10 10x SAQ 20x5\"  10x5\" 5\"x20      Seated R knee flex 3/14 5\" 10 5\" x 10          Ankle pumps R 3/14 10 10x           Step stretch    6\" 10x5\" 8\" 10x5\" 8\" 10x5\" 8\" 10 x5\"    " "  Std SLR x3 dir      2lb 10x2 bl 2# 2x10 ea      SLR flex      10x 2x10      Prone quad stretch       10\"x10      Lat hip stretch with strap       10\"x10      Ther Activity                              Gait Training                              Modalities    CP R knee             Lymph compression   15'  15' 10' np                       "

## 2024-04-08 ENCOUNTER — OFFICE VISIT (OUTPATIENT)
Dept: PHYSICAL THERAPY | Facility: CLINIC | Age: 54
End: 2024-04-08
Payer: COMMERCIAL

## 2024-04-08 DIAGNOSIS — M17.11 LOCALIZED OSTEOARTHRITIS OF RIGHT KNEE: ICD-10-CM

## 2024-04-08 DIAGNOSIS — Z96.651 HISTORY OF TOTAL RIGHT KNEE REPLACEMENT: Primary | ICD-10-CM

## 2024-04-08 PROCEDURE — 97016 VASOPNEUMATIC DEVICE THERAPY: CPT

## 2024-04-08 PROCEDURE — 97140 MANUAL THERAPY 1/> REGIONS: CPT

## 2024-04-08 PROCEDURE — 97110 THERAPEUTIC EXERCISES: CPT

## 2024-04-08 NOTE — PROGRESS NOTES
"Daily Note     Today's date: 2024  Patient name: Clint Fierro III  : 1970  MRN: [id number]  Referring provider: Marjorie Miles PA-C  Dx:   Encounter Diagnosis     ICD-10-CM    1. History of total right knee replacement  Z96.651       2. Localized osteoarthritis of right knee  M17.11                      Subjective: Pt reports being in a lot of pain, bellieved he over did it.      Objective: See treatment diary below      Assessment: Focused on pain management and ROM during session. Pt was able to accomplish 78 degrees of flexion and 80 degrees of PROM.       Plan: Continue per plan of care.  Progress treatment as tolerated.       POC EXPIRES On:  24  PRECAUTIONS:  None  CO-MORBIDITES:  None  PERSONAL FACTORS:  L TKA on 24  Access Code:  [email or web address]      Manuals HEP 3/14 3/19  3/25 3/28 4/1 4/3 4/8     PROM R knee flex/ext  10' 10' JK 15 JK 10' Ksg 8' Wdc 10'     R H/S stretch  2'  jk         Ktape scar      JK           15' 15        Neuro Re-Ed                                                                                                Ther Ex    Bike for ROM    5' 1/2 circles 5' 1/2 circles 6' 1/2 circles 6' rock      Prone hangs R 3/14 30\" x3 30\"x 3           Quad sets R 3/14 5\" 10 5\" x 10 5\"x10 5\" 10 10x10\" 10x10\" 10x10\"     Supine strap H/S stretch R 3/14 10\" 10  10\"x10   10\" 10 10\" 10 10x10\" 10x10\"     Heel slides with strap R 3/14 10\" 10 10\"x10  0n wall 10x10\" 10\" 10 10\" 10 10x10\" 10x10\"     Supine 90/90 with towel     10\" 10        R LAQ 3/14 10 10x SAQ 20x5\"  10x5\" 5\"x20      Seated R knee flex 3/14 5\" 10 5\" x 10          Ankle pumps R 3/14 10 10x           Step stretch    6\" 10x5\" 8\" 10x5\" 8\" 10x5\" 8\" 10 x5\"      Std SLR x3 dir      2lb 10x2 bl 2# 2x10 ea      SLR flex      10x 2x10      Prone quad stretch       10\"x10      Lat hip stretch with strap       10\"x10      Ther Activity                              Gait Training                              Modalities    CP R knee     "    10'     Lymph compression   15'  15' 10' np  45mmhg 15'

## 2024-04-10 ENCOUNTER — APPOINTMENT (OUTPATIENT)
Dept: PHYSICAL THERAPY | Facility: CLINIC | Age: 54
End: 2024-04-10
Payer: COMMERCIAL

## 2024-04-12 ENCOUNTER — OFFICE VISIT (OUTPATIENT)
Dept: OBGYN CLINIC | Facility: CLINIC | Age: 54
End: 2024-04-12

## 2024-04-12 VITALS
BODY MASS INDEX: 36.16 KG/M2 | DIASTOLIC BLOOD PRESSURE: 97 MMHG | SYSTOLIC BLOOD PRESSURE: 135 MMHG | HEART RATE: 77 BPM | WEIGHT: 267 LBS | HEIGHT: 72 IN

## 2024-04-12 DIAGNOSIS — M17.12 PRIMARY OSTEOARTHRITIS OF LEFT KNEE: Primary | ICD-10-CM

## 2024-04-12 NOTE — PROGRESS NOTES
Subjective: Patient seen and examined. Patient is s/p right total knee arthroplasty 2/29/24. He is using a single crutch to ambulate. He is attending PT and he was able to get to about 80* a few days ago.  He is having pain in his hamstrings. Pain relatively controlled.  He is wearing a knee sleeve. Progressing well. Incision well healed. Denies fevers or chills    Physical Exam:  Incision: well healed surgical incision, no erythema or drainage  ROM: 5-80  5/5 IP/Q/HS/TA/GS, 2+ DP/PT, SILT DP/SP/S/S/TN      Assessment/Plan:  55 y/o male s/p right total knee replacement 2/29/24    - continue multi-modal pain control   - Weight bearing status: WBAT  - DVT ppx: complete  - Incision care:  no restrictions  - PT/OT, continue to work on flexion aggressively with PT  - F/U 4 weeks to recheck motion.  If he is still struggling, we will likely plan for manip.   - medial  brace script placed for patient today for the left knee.      Scribe Attestation      I,:  Marjorie Miles PA-C am acting as a scribe while in the presence of the attending physician.:       I,:  Christiano Delgado, DO personally performed the services described in this documentation    as scribed in my presence.:

## 2024-04-15 ENCOUNTER — OFFICE VISIT (OUTPATIENT)
Dept: PHYSICAL THERAPY | Facility: CLINIC | Age: 54
End: 2024-04-15
Payer: COMMERCIAL

## 2024-04-15 ENCOUNTER — TELEPHONE (OUTPATIENT)
Dept: OBGYN CLINIC | Facility: HOSPITAL | Age: 54
End: 2024-04-15

## 2024-04-15 DIAGNOSIS — Z96.651 HISTORY OF TOTAL RIGHT KNEE REPLACEMENT: ICD-10-CM

## 2024-04-15 DIAGNOSIS — M17.11 LOCALIZED OSTEOARTHRITIS OF RIGHT KNEE: Primary | ICD-10-CM

## 2024-04-15 PROCEDURE — 97140 MANUAL THERAPY 1/> REGIONS: CPT

## 2024-04-15 PROCEDURE — 97110 THERAPEUTIC EXERCISES: CPT

## 2024-04-15 NOTE — PROGRESS NOTES
"Daily Note     Today's date: 4/15/2024  Patient name: Clint Fierro III  : 1970  MRN: [id number]  Referring provider: Marjorie Miles PA-C  Dx:   Encounter Diagnosis     ICD-10-CM    1. Localized osteoarthritis of right knee  M17.11       2. History of total right knee replacement  Z96.651                      Subjective: Patient noted that he has been back to work. Patient noted achy R knee pain.       Objective: See treatment diary below      Assessment: Patient noted less pain compared to last session therefore added exercises back into treatment. Patient was able to perform listed exercises with correct form.      Plan: Continue per plan of care.      POC EXPIRES On:  24  PRECAUTIONS:  None  CO-MORBIDITES:  None  PERSONAL FACTORS:  L TKA on 24  Access Code:  [email or web address]      Manuals HEP 3/14 3/19  3/25 3/28 4/1 4/3 4/8 4/15    PROM R knee flex/ext  10' 10' JK 15 JK 10' Ksg 8' Wdc 10' 10'    R H/S stretch  2'  jk         Ktape scar      JK           15' 15        Neuro Re-Ed                                                                                                Ther Ex    Bike for ROM    5' 1/2 circles 5' 1/2 circles 6' 1/2 circles 6' rock  6' rock    Prone hangs R 3/14 30\" x3 30\"x 3           Quad sets R 3/14 5\" 10 5\" x 10 5\"x10 5\" 10 10x10\" 10x10\" 10x10\" 10 x 10''    Supine strap H/S stretch R 3/14 10\" 10  10\"x10   10\" 10 10\" 10 10x10\" 10x10\" 10 x 10\"    Heel slides with strap R 3/14 10\" 10 10\"x10  0n wall 10x10\" 10\" 10 10\" 10 10x10\" 10x10\" 10 x 10\"     Supine 90/90 with towel     10\" 10        R LAQ 3/14 10 10x SAQ 20x5\"  10x5\" 5\"x20  nv    Seated R knee flex 3/14 5\" 10 5\" x 10          Ankle pumps R 3/14 10 10x           Step stretch    6\" 10x5\" 8\" 10x5\" 8\" 10x5\" 8\" 10 x5\"  nv    Std SLR x3 dir      2lb 10x2 bl 2# 2x10 ea  nv    SLR flex      10x 2x10  2 x 10    Prone quad stretch       10\"x10  10 '' x 10    Lat hip stretch with strap       10\"x10      Ther Activity                    "           Gait Training                              Modalities    CP R knee        10'     Lymph compression   15'  15' 10' np  45mmhg 15'

## 2024-04-15 NOTE — TELEPHONE ENCOUNTER
[employer] [facility] requesting note sent for medial unloaders to [phone]       Completed

## 2024-04-17 ENCOUNTER — APPOINTMENT (OUTPATIENT)
Dept: PHYSICAL THERAPY | Facility: CLINIC | Age: 54
End: 2024-04-17
Payer: COMMERCIAL

## 2024-04-22 ENCOUNTER — OFFICE VISIT (OUTPATIENT)
Dept: PHYSICAL THERAPY | Facility: CLINIC | Age: 54
End: 2024-04-22
Payer: COMMERCIAL

## 2024-04-22 DIAGNOSIS — M17.11 LOCALIZED OSTEOARTHRITIS OF RIGHT KNEE: Primary | ICD-10-CM

## 2024-04-22 DIAGNOSIS — Z96.651 HISTORY OF TOTAL RIGHT KNEE REPLACEMENT: ICD-10-CM

## 2024-04-22 PROCEDURE — 97110 THERAPEUTIC EXERCISES: CPT

## 2024-04-22 PROCEDURE — 97140 MANUAL THERAPY 1/> REGIONS: CPT

## 2024-04-22 NOTE — PROGRESS NOTES
"Daily Note     Today's date: 2024  Patient name: Clint Fierro III  : 1970  MRN: [id number]  Referring provider: Marjorie Miles PA-C  Dx:   Encounter Diagnosis     ICD-10-CM    1. Localized osteoarthritis of right knee  M17.11       2. History of total right knee replacement  Z96.651                      Subjective: Patient reports over doing it during the weekend.      Objective: See treatment diary below      Assessment: Patient arrived with an antalgic gait, focus on gentle ROM an dpain management.       Plan: Continue per plan of care.      POC EXPIRES On:  24  PRECAUTIONS:  None  CO-MORBIDITES:  None  PERSONAL FACTORS:  L TKA on 24  Access Code:  [email or web address]      Manuals HEP 3/14 3/19  3/25 3/28 4/1 4/3 4/8 4/15 4/22   PROM R knee flex/ext  10' 10' JK 15 JK 10' Ksg 8' Wdc 10' 10' Wdc 10   R H/S stretch  2'  jk         Ktape scar      JK           15' 15        Neuro Re-Ed                                                                                                Ther Ex    Bike for ROM    5' 1/2 circles 5' 1/2 circles 6' 1/2 circles 6' rock  6' rock 5' pre  5' post   Prone hangs R 3/14 30\" x3 30\"x 3           Quad sets R 3/14 5\" 10 5\" x 10 5\"x10 5\" 10 10x10\" 10x10\" 10x10\" 10 x 10'' 10x10     Supine strap H/S stretch R 3/14 10\" 10  10\"x10   10\" 10 10\" 10 10x10\" 10x10\" 10 x 10\"    Heel slides with strap R 3/14 10\" 10 10\"x10  0n wall 10x10\" 10\" 10 10\" 10 10x10\" 10x10\" 10 x 10\"     Supine 90/90 with towel     10\" 10        R LAQ 3/14 10 10x SAQ 20x5\"  10x5\" 5\"x20  nv 20   Seated R knee flex 3/14 5\" 10 5\" x 10       10\" 20 OP   Ankle pumps R 3/14 10 10x           Step stretch    6\" 10x5\" 8\" 10x5\" 8\" 10x5\" 8\" 10 x5\"  nv    Std SLR x3 dir      2lb 10x2 bl 2# 2x10 ea  nv 20   SLR flex      10x 2x10  2 x 10 20   Prone quad stretch       10\"x10  10 '' x 10 hep   Lat hip stretch with strap       10\"x10      Ther Activity    Leg Press B          125# 20   Leg Press SL          65# 20   Sit to " stand          20x   Gait Training                              Modalities    CP R knee        10'  def   Lymph compression   15'  15' 10' np  45mmhg 15'  def

## 2024-04-24 ENCOUNTER — OFFICE VISIT (OUTPATIENT)
Dept: PHYSICAL THERAPY | Facility: CLINIC | Age: 54
End: 2024-04-24
Payer: COMMERCIAL

## 2024-04-24 ENCOUNTER — TELEPHONE (OUTPATIENT)
Age: 54
End: 2024-04-24

## 2024-04-24 DIAGNOSIS — M17.11 LOCALIZED OSTEOARTHRITIS OF RIGHT KNEE: Primary | ICD-10-CM

## 2024-04-24 DIAGNOSIS — Z96.651 HISTORY OF TOTAL RIGHT KNEE REPLACEMENT: ICD-10-CM

## 2024-04-24 PROCEDURE — 97140 MANUAL THERAPY 1/> REGIONS: CPT | Performed by: PHYSICAL THERAPIST

## 2024-04-24 PROCEDURE — 97110 THERAPEUTIC EXERCISES: CPT | Performed by: PHYSICAL THERAPIST

## 2024-04-24 NOTE — TELEPHONE ENCOUNTER
Zakia from [facility], needs a new referral renewed for the new year, as they're continuing care with the patient. Current referral that they have expires on April 30th.     Fax Number: [phone]    If there is additional questions please reach out to Zakia at [phone].

## 2024-04-24 NOTE — PROGRESS NOTES
"Daily Note     Today's date: 2024  Patient name: Clint Fierro III  : 1970  MRN: [id number]  Referring provider: Marjorie Miles PA-C  Dx:   Encounter Diagnosis     ICD-10-CM    1. Localized osteoarthritis of right knee  M17.11       2. History of total right knee replacement  Z96.651                        Subjective:  Pt states he feels like his old quadriceps injury is limiting him, not his R TKA.        Objective:  See treatment diary below      Assessment:  Pt has improved both R knee flex and ext since starting PT with much less pain.  He is limited in flex ROM due to an old R quadriceps injury > his TKA.  He did not have good ROM prior to surgery.  R patellar mobility is still poor.  He was able to perform exercises with light weights, more reps without issues.  Sit to stands are now independent from a chair but lower surfaces are still challenging to get up from.          Plan:  Change sit to stand from chair to low large blue mat to progress function and challenge of activity.        POC EXPIRES On:  24  PRECAUTIONS:  None  CO-MORBIDITES:  None  PERSONAL FACTORS:  R TKA on 24  Access Code:  [email or web address]      Manuals HEP 3/14 3/19  3/25 3/28 4/1 4/3 4/8 4/15 4/22 4/24   PROM R knee flex/ext  10' 10' JK 15 JK 10' Ksg 8' Wdc 10' 10' Wdc 10 8'   R H/S stretch  2'  jk          Ktape scar      JK        R patellar mobs    15' 15      2'   Neuro Re-Ed                                                                                                        Ther Ex     Bike for ROM    5' 1/2 circles 5' 1/2 circles 6' 1/2 circles 6' rock  6' rock 5' pre  5' post 8' partial   Prone hangs R 3/14 30\" x3 30\"x 3            Quad sets R 3/14 5\" 10 5\" x 10 5\"x10 5\" 10 10x10\" 10x10\" 10x10\" 10 x 10'' 10x10   5\" 20   Supine strap H/S stretch R 3/14 10\" 10  10\"x10   10\" 10 10\" 10 10x10\" 10x10\" 10 x 10\"  20\" 5   Heel slides with strap R 3/14 10\" 10 10\"x10  0n wall 10x10\" 10\" 10 10\" 10 10x10\" 10x10\" 10 x 10\"   " "10\" 10   Supine 90/90 with towel     10\" 10         R LAQ 3/14 10 10x SAQ 20x5\"  10x5\" 5\"x20  nv 20 2# 30   R SAQ            2# 30   Seated R knee flex 3/14 5\" 10 5\" x 10       10\" 20 OP    Ankle pumps R 3/14 10 10x            Step stretch    6\" 10x5\" 8\" 10x5\" 8\" 10x5\" 8\" 10 x5\"  nv     Std SLR x3 dir      2lb 10x2 bl 2# 2x10 ea  nv 20 2# 20 ea   SLR flex supine R      10x 2x10  2 x 10 20 2# 20   Prone quad stretch       10\"x10  10 '' x 10 hep    Lat hip stretch with strap       10\"x10       Ther Activity     Leg Press B          125# 20 165# 2x20   Leg Press SL          65# 20 65# 2x20   Sit to stand          20x 20   Gait Training                                 Modalities     CP R knee        10'  def    Lymph compression   15'  15' 10' np  45mmhg 15'  def                      "

## 2024-04-29 ENCOUNTER — OFFICE VISIT (OUTPATIENT)
Dept: PHYSICAL THERAPY | Facility: CLINIC | Age: 54
End: 2024-04-29
Payer: COMMERCIAL

## 2024-04-29 DIAGNOSIS — M17.11 LOCALIZED OSTEOARTHRITIS OF RIGHT KNEE: Primary | ICD-10-CM

## 2024-04-29 DIAGNOSIS — Z96.651 HISTORY OF TOTAL RIGHT KNEE REPLACEMENT: ICD-10-CM

## 2024-04-29 PROCEDURE — 97112 NEUROMUSCULAR REEDUCATION: CPT

## 2024-04-29 PROCEDURE — 97110 THERAPEUTIC EXERCISES: CPT

## 2024-04-29 PROCEDURE — 97140 MANUAL THERAPY 1/> REGIONS: CPT

## 2024-04-29 NOTE — PROGRESS NOTES
"Daily Note     Today's date: 2024  Patient name: Clint Fierro III  : 1970  MRN: [id number]  Referring provider: Marjorie Miles PA-C  Dx:   Encounter Diagnosis     ICD-10-CM    1. Localized osteoarthritis of right knee  M17.11       2. History of total right knee replacement  Z96.651                      Subjective: Pt reports his L knee is killing him from the brace.  States the R knee is improving.      Objective: See treatment diary below      Assessment: Tolerated treatment fair. Patient session directed towards knee ROM.  Pt encouraged the best thing he can do for his L knee is work on ROM and pre-op strengthening.  Pt did well w/ strengthening of the R.  Pt cont's to need work on his R knee ROM.  Pt would benefit from cont'd PT.      Plan: Continue per plan of care.  Progress treatment as tolerated.       POC EXPIRES On:  24  PRECAUTIONS:  None  CO-MORBIDITES:  None  PERSONAL FACTORS:  R TKA on 24  Access Code:  [email or web address]      Manuals 4/29       4/8 4/15 4/22 4/24   PROM R knee flex/ext JK/ renny 8'       Wdc 10' 10' Wdc 10 8'   R H/S stretch              Ktape scar              IASTM 6' jk             R patellar mobs           2'   Neuro Re-Ed                                                                                                        Ther Ex     Bike for ROM 6' ROM        6' rock 5' pre  5' post 8' partial   Prone hangs R              Quad sets R 5\" 20       10x10\" 10 x 10'' 10x10   5\" 20   Supine strap H/S stretch R        10x10\" 10 x 10\"  20\" 5   Heel slides with strap R 10\" 10 2#       10x10\" 10 x 10\"   10\" 10   Supine 90/90 with towel              R LAQ 2# 30        nv 20 2# 30   R SAQ            2# 30   Seated R knee flex          10\" 20 OP    Ankle pumps R              Step stretch         nv     Std SLR x3 dir 2# 20 ea        nv 20 2# 20 ea   SLR flex supine R 2# 20        2 x 10 20 2# 20   Prone quad stretch         10 '' x 10 hep    Lat hip stretch with strap         "      Ther Activity     Leg Press B          125# 20 165# 2x20   Leg Press SL          65# 20 65# 2x20   Sit to stand          20x 20   Gait Training                                 Modalities     CP R knee        10'  def    Lymph compression        45mmhg 15'  def

## 2024-05-01 ENCOUNTER — APPOINTMENT (OUTPATIENT)
Dept: PHYSICAL THERAPY | Facility: CLINIC | Age: 54
End: 2024-05-01
Payer: COMMERCIAL

## 2024-05-06 ENCOUNTER — OFFICE VISIT (OUTPATIENT)
Dept: PHYSICAL THERAPY | Facility: CLINIC | Age: 54
End: 2024-05-06
Payer: COMMERCIAL

## 2024-05-06 DIAGNOSIS — Z96.651 HISTORY OF TOTAL RIGHT KNEE REPLACEMENT: ICD-10-CM

## 2024-05-06 DIAGNOSIS — M17.11 LOCALIZED OSTEOARTHRITIS OF RIGHT KNEE: Primary | ICD-10-CM

## 2024-05-06 PROCEDURE — 97110 THERAPEUTIC EXERCISES: CPT

## 2024-05-06 PROCEDURE — 97016 VASOPNEUMATIC DEVICE THERAPY: CPT

## 2024-05-06 NOTE — PROGRESS NOTES
"Daily Note     Today's date: 2024  Patient name: Clint Fierro III  : 1970  MRN: [id number]  Referring provider: Marjorie Miles PA-C  Dx:   Encounter Diagnosis     ICD-10-CM    1. Localized osteoarthritis of right knee  M17.11       2. History of total right knee replacement  Z96.651                      Subjective: Pt reports his L knee is killing him and its very swollen, unsure what he can do.    Objective: See treatment diary below      Assessment: Focused on pain management during PT session per pt request, compression pump and PROM during session.     Plan: Continue per plan of care.  Progress treatment as tolerated.       POC EXPIRES On:  24  PRECAUTIONS:  None  CO-MORBIDITES:  None  PERSONAL FACTORS:  R TKA on 24  Access Code:  [email or web address]      Manuals 4/29 5/6      4/8 4/15 4/22 4/24   PROM R knee flex/ext JK/ renny 8' 15'      Wdc 10' 10' Wdc 10 8'   R H/S stretch              Ktape scar              IASTM 6' jk             R patellar mobs           2'   Neuro Re-Ed                                                                                                        Ther Ex     Bike for ROM 6' ROM        6' rock 5' pre  5' post 8' partial   Prone hangs R              Quad sets R 5\" 20 5\" 20      10x10\" 10 x 10'' 10x10   5\" 20   Supine strap H/S stretch R  20\" 3      10x10\" 10 x 10\"  20\" 5   Heel slides with strap R 10\" 10 2#       10x10\" 10 x 10\"   10\" 10   Supine 90/90 with towel              R LAQ 2# 30        nv 20 2# 30   R SAQ            2# 30   Seated R knee flex          10\" 20 OP    Ankle pumps R              Step stretch         nv     Std SLR x3 dir 2# 20 ea        nv 20 2# 20 ea   SLR flex supine R 2# 20        2 x 10 20 2# 20                               Ther Activity     Leg Press B          125# 20 165# 2x20   Leg Press SL          65# 20 65# 2x20   Sit to stand          20x 20   Gait Training                                 Modalities     CP R knee        10'  def  "   Lymph compression  45mmhg  15'      45mmhg 15'  def

## 2024-05-07 ENCOUNTER — TELEPHONE (OUTPATIENT)
Dept: OBGYN CLINIC | Facility: HOSPITAL | Age: 54
End: 2024-05-07

## 2024-05-07 NOTE — TELEPHONE ENCOUNTER
Caller: Rosaura (Spouse)    Doctor: Danny    Reason for call: Spouse is calling because patient cannot make his PO Appointment on 05/10/24. Asking if you can help get him in on another date at any location? [facility] has nothing till June/July.  She asked me to hold the appointment on 05/10/24, until she gets a call from the office.      Call back#: [phone]

## 2024-05-08 ENCOUNTER — APPOINTMENT (OUTPATIENT)
Dept: PHYSICAL THERAPY | Facility: CLINIC | Age: 54
End: 2024-05-08
Payer: COMMERCIAL

## 2024-05-13 ENCOUNTER — OFFICE VISIT (OUTPATIENT)
Dept: PHYSICAL THERAPY | Facility: CLINIC | Age: 54
End: 2024-05-13
Payer: COMMERCIAL

## 2024-05-13 DIAGNOSIS — M17.11 LOCALIZED OSTEOARTHRITIS OF RIGHT KNEE: Primary | ICD-10-CM

## 2024-05-13 DIAGNOSIS — Z96.651 HISTORY OF TOTAL RIGHT KNEE REPLACEMENT: ICD-10-CM

## 2024-05-13 PROCEDURE — 97110 THERAPEUTIC EXERCISES: CPT

## 2024-05-13 PROCEDURE — 97140 MANUAL THERAPY 1/> REGIONS: CPT

## 2024-05-13 PROCEDURE — 97112 NEUROMUSCULAR REEDUCATION: CPT

## 2024-05-13 NOTE — PROGRESS NOTES
"Daily Note     Today's date: 2024  Patient name: Clint Fierro III  : 1970  MRN: [id number]  Referring provider: Marjorie Miles PA-C  Dx:   Encounter Diagnosis     ICD-10-CM    1. Localized osteoarthritis of right knee  M17.11       2. History of total right knee replacement  Z96.651                      Subjective: Pt reports feeling better today.      Objective: See treatment diary below      Assessment: Resume below TE with better tolerance, and pt strength is gradually improving as well as ROM.    Plan: Continue per plan of care.  Progress treatment as tolerated.       POC EXPIRES On:  24  PRECAUTIONS:  None  CO-MORBIDITES:  None  PERSONAL FACTORS:  R TKA on 24  Access Code:  [email or web address]      Manuals 4/29 5/6 5/13     4/8 4/15 4/22 4/24   PROM R knee flex/ext JK/ renny 8' 15' Wdc 8'     Wdc 10' 10' Wdc 10 8'   R H/S stretch              Ktape scar              IASTM 6' jk             R patellar mobs           2'   Neuro Re-Ed                                                                                                        Ther Ex     Bike for ROM 6' ROM 6' ROM 6' ROM      6' rock 5' pre  5' post 8' partial   Prone hangs R              Quad sets R 5\" 20 5\" 20 5\" 20     10x10\" 10 x 10'' 10x10   5\" 20   Supine strap H/S stretch R  20\" 3 20\" 3 seated     10x10\" 10 x 10\"  20\" 5   Heel slides with strap R 10\" 10 2#       10x10\" 10 x 10\"   10\" 10   Supine 90/90 with towel              R LAQ 2# 30  2# 20      nv 20 2# 30   R SAQ    2# 20        2# 30   Seated R knee flex          10\" 20 OP    Ankle pumps R              Step stretch         nv     Std SLR x3 dir 2# 20 ea  2# 20 ea         nv 20 2# 20 ea   SLR flex supine R 2# 20  2# 20         2 x 10 20 2# 20   S/L SLR   2# 20              Prone SLR   2# 20              Ther Activity     Leg Press B   125# 20       125# 20 165# 2x20   Leg Press SL   65# 20       65# 20 65# 2x20   Sit to stand   20       20x 20   Gait Training                     "             Modalities     CP R knee        10'  def    Lymph compression  45mmhg  15'      45mmhg 15'  def

## 2024-05-15 ENCOUNTER — APPOINTMENT (OUTPATIENT)
Dept: PHYSICAL THERAPY | Facility: CLINIC | Age: 54
End: 2024-05-15
Payer: COMMERCIAL

## 2024-05-15 DIAGNOSIS — M17.11 LOCALIZED OSTEOARTHRITIS OF RIGHT KNEE: Primary | ICD-10-CM

## 2024-05-15 DIAGNOSIS — Z96.651 HISTORY OF TOTAL RIGHT KNEE REPLACEMENT: ICD-10-CM

## 2024-05-17 ENCOUNTER — PREP FOR PROCEDURE (OUTPATIENT)
Dept: OBGYN CLINIC | Facility: CLINIC | Age: 54
End: 2024-05-17

## 2024-05-17 ENCOUNTER — OFFICE VISIT (OUTPATIENT)
Dept: OBGYN CLINIC | Facility: CLINIC | Age: 54
End: 2024-05-17

## 2024-05-17 VITALS
WEIGHT: 267 LBS | HEART RATE: 74 BPM | HEIGHT: 72 IN | DIASTOLIC BLOOD PRESSURE: 93 MMHG | BODY MASS INDEX: 36.16 KG/M2 | SYSTOLIC BLOOD PRESSURE: 132 MMHG

## 2024-05-17 DIAGNOSIS — Z96.651 AFTERCARE FOLLOWING RIGHT KNEE JOINT REPLACEMENT SURGERY: Primary | ICD-10-CM

## 2024-05-17 DIAGNOSIS — Z47.1 AFTERCARE FOLLOWING RIGHT KNEE JOINT REPLACEMENT SURGERY: Primary | ICD-10-CM

## 2024-05-17 DIAGNOSIS — M24.661 ARTHROFIBROSIS OF KNEE JOINT, RIGHT: ICD-10-CM

## 2024-05-17 PROCEDURE — 99024 POSTOP FOLLOW-UP VISIT: CPT | Performed by: PHYSICIAN ASSISTANT

## 2024-05-17 RX ORDER — ACETAMINOPHEN 325 MG/1
975 TABLET ORAL ONCE
OUTPATIENT
Start: 2024-05-17 | End: 2024-05-17

## 2024-05-17 NOTE — PROGRESS NOTES
Orthopaedic Surgery - History & Physical Exam  Clint Fierro III (54 y.o. male)   : 1970   MRN: [id number]  Date: 2024   Encounter: [id number]   Unit/Bed#:         Assessment / Plan  Right knee arthrofibrosis following Right TKA from 24.    continue multi-modal pain control   Weight bearing status: WBAT  Incision care:  no restrictions  PT/OT, continue to work on flexion, will plan on aggressive PT post operatively.   Will plan for manip of the Right knee as he is still struggling.  Case request placed today and surgical date scheduled.    Risks and benefits of surgery to include but not limited to bleeding, infection, damage to surrounding structures, hardware failure, instability, fracture, dislocation, need for further surgery, continued pain, stiffness, blood clots, stroke, and heart attack was discussed with the patient. Informed consent was signed today in the office. The patient has met with our surgical schedulers. All questions were answered.   Follow up post op.    History of Present Illness  Clint Fierro III is a 54 y.o. male who presents for follow up of his Right knee pain.  He is s/p right total knee arthroplasty 24. He is using no assistive devices to ambulate. He is attending PT and he was able to get to about 80*, but has hit a plateau.  He complains of pain and tightness in the muscles surrounding the knee joint.  Pain relatively controlled.  He is wearing a knee sleeve.  Incision well healed. Denies fevers or chills .    Review of Systems  Negative for chest pain and shortness of breath    Medical, Surgical, Family, and Social History    Past Medical History:   Diagnosis Date    Hearing loss, left     Hyperlipidemia     Osteoarthritis     right knee    Pain in right knee     Pneumonia     Seasonal allergies        Past Surgical History:   Procedure Laterality Date    COLONOSCOPY      FRACTURE SURGERY      right humerus    KNEE ARTHROSCOPY Bilateral     debridement     [place] ARTHRP KNE CONDYLE&PLATU MEDIAL&LAT COMPARTMENTS Right 2/29/2024    Procedure: ARTHROPLASTY KNEE TOTAL;  Surgeon: Christiano Delgado DO;  Location: [facility];  Service: Orthopedics    ROTATOR CUFF REPAIR Right     labrum, rotator cuff and bicep tendon repairs    SHOULDER SURGERY Right 2014       Family History   Problem Relation Age of Onset    Alcohol abuse Mother         denies    Substance Abuse Mother         denies    Mental illness Mother         denies    Colon polyps Neg Hx     Colon cancer Neg Hx        Social History     Occupational History    Not on file   Tobacco Use    Smoking status: Former     Types: Cigarettes    Smokeless tobacco: Current     Types: Snuff    Tobacco comments:     Quit smoking 35 yrs ago   Vaping Use    Vaping status: Never Used   Substance and Sexual Activity    Alcohol use: Yes     Comment: few x week    Drug use: No    Sexual activity: Not on file       Allergies   Allergen Reactions    Chantix [Varenicline] Swelling     In legs       Not in a hospital admission.  No current facility-administered medications for this visit.       Vitals  /93 (BP Location: Right arm, Patient Position: Sitting, Cuff Size: Large)   Pulse 74   Ht 6' (1.829 m)   Wt 121 kg (267 lb)   BMI 36.21 kg/m²     Body mass index is 36.21 kg/m².    Physical Exam  General:  Alert & oriented x3, no distress, appears stated age  HEENT:  EOMI, eyes clear, hearing intact, mucous membranes moist  Neck:  Supple, non-tender, trachea midline, no lymphadenopathy  Cardiovascular:  Regular rate, no discernable arrhythmia  Pulmonary:  Regular rate, respirations non-labored   Abdominal:  Soft, non-tender    Ortho Exam - Right Lower Extremity  Right knee:   Incision: well healed surgical incision, no erythema or drainage  ROM: 10-80  5/5 IP/Q/HS/TA/GS, 2+ DP/PT, SILT DP/SP/S/S/TN    Imaging  No studies to review    Lab Results  (I have personally reviewed pertinent lab results.)  Lab Results   Component Value  Date    WBC 5.92 02/05/2024    HGB 15.3 02/05/2024    HCT 45.8 02/05/2024     02/05/2024    RBC 5.15 02/05/2024    MCV 89 02/05/2024    MCH 29.7 02/05/2024    MCHC 33.4 02/05/2024    RDW 12.1 02/05/2024    MPV 11.1 02/05/2024    NRBC 0 02/05/2024     Lab Results   Component Value Date    PTT 27 02/05/2024    INR 1.04 02/05/2024     Lab Results   Component Value Date    K 4.3 02/05/2024     02/05/2024    CO2 30 02/05/2024    BUN 18 02/05/2024    CREATININE 1.16 02/05/2024    EGFR 70 02/05/2024    CALCIUM 9.4 02/05/2024    ALKPHOS 43 02/05/2024    ALT 34 02/05/2024    AST 19 02/05/2024     Lab Results   Component Value Date    CRP <3.0 08/25/2021     Lab Results   Component Value Date    BLOODCX No Growth After 5 Days. 10/14/2017    SPUTUMCULTUR 3+ Growth of 10/15/2017    GRAMSTAIN Rare Epithelial cells per low power field 10/15/2017    GRAMSTAIN Rare Polys 10/15/2017    GRAMSTAIN Rare Gram positive cocci in pairs 10/15/2017         Counseling / Coordination of Care  Total floor / unit time spent today 20 minutes.  Greater than 50% of total time was spent with the patient and / or family counseling and / or coordination of care.        Marjorie Miles PA-C

## 2024-05-17 NOTE — H&P (VIEW-ONLY)
Orthopaedic Surgery - History & Physical Exam  Clint Fierro III (54 y.o. male)   : 1970   MRN: [id number]  Date: 2024   Encounter: [id number]   Unit/Bed#:         Assessment / Plan  Right knee arthrofibrosis following Right TKA from 24.    continue multi-modal pain control   Weight bearing status: WBAT  Incision care:  no restrictions  PT/OT, continue to work on flexion, will plan on aggressive PT post operatively.   Will plan for manip of the Right knee as he is still struggling.  Case request placed today and surgical date scheduled.    Risks and benefits of surgery to include but not limited to bleeding, infection, damage to surrounding structures, hardware failure, instability, fracture, dislocation, need for further surgery, continued pain, stiffness, blood clots, stroke, and heart attack was discussed with the patient. Informed consent was signed today in the office. The patient has met with our surgical schedulers. All questions were answered.   Follow up post op.    History of Present Illness  Clint Fierro III is a 54 y.o. male who presents for follow up of his Right knee pain.  He is s/p right total knee arthroplasty 24. He is using no assistive devices to ambulate. He is attending PT and he was able to get to about 80*, but has hit a plateau.  He complains of pain and tightness in the muscles surrounding the knee joint.  Pain relatively controlled.  He is wearing a knee sleeve.  Incision well healed. Denies fevers or chills .    Review of Systems  Negative for chest pain and shortness of breath    Medical, Surgical, Family, and Social History    Past Medical History:   Diagnosis Date    Hearing loss, left     Hyperlipidemia     Osteoarthritis     right knee    Pain in right knee     Pneumonia     Seasonal allergies        Past Surgical History:   Procedure Laterality Date    COLONOSCOPY      FRACTURE SURGERY      right humerus    KNEE ARTHROSCOPY Bilateral     debridement     NE ARTHRP KNE CONDYLE&PLATU MEDIAL&LAT COMPARTMENTS Right 2/29/2024    Procedure: ARTHROPLASTY KNEE TOTAL;  Surgeon: Christiano Delgado DO;  Location: [facility];  Service: Orthopedics    ROTATOR CUFF REPAIR Right     labrum, rotator cuff and bicep tendon repairs    SHOULDER SURGERY Right 2014       Family History   Problem Relation Age of Onset    Alcohol abuse Mother         denies    Substance Abuse Mother         denies    Mental illness Mother         denies    Colon polyps Neg Hx     Colon cancer Neg Hx        Social History     Occupational History    Not on file   Tobacco Use    Smoking status: Former     Types: Cigarettes    Smokeless tobacco: Current     Types: Snuff    Tobacco comments:     Quit smoking 35 yrs ago   Vaping Use    Vaping status: Never Used   Substance and Sexual Activity    Alcohol use: Yes     Comment: few x week    Drug use: No    Sexual activity: Not on file       Allergies   Allergen Reactions    Chantix [Varenicline] Swelling     In legs       Not in a hospital admission.  No current facility-administered medications for this visit.       Vitals  /93 (BP Location: Right arm, Patient Position: Sitting, Cuff Size: Large)   Pulse 74   Ht 6' (1.829 m)   Wt 121 kg (267 lb)   BMI 36.21 kg/m²     Body mass index is 36.21 kg/m².    Physical Exam  General:  Alert & oriented x3, no distress, appears stated age  HEENT:  EOMI, eyes clear, hearing intact, mucous membranes moist  Neck:  Supple, non-tender, trachea midline, no lymphadenopathy  Cardiovascular:  Regular rate, no discernable arrhythmia  Pulmonary:  Regular rate, respirations non-labored   Abdominal:  Soft, non-tender    Ortho Exam - Right Lower Extremity  Right knee:   Incision: well healed surgical incision, no erythema or drainage  ROM: 10-80  5/5 IP/Q/HS/TA/GS, 2+ DP/PT, SILT DP/SP/S/S/TN    Imaging  No studies to review    Lab Results  (I have personally reviewed pertinent lab results.)  Lab Results   Component Value  Date    WBC 5.92 02/05/2024    HGB 15.3 02/05/2024    HCT 45.8 02/05/2024     02/05/2024    RBC 5.15 02/05/2024    MCV 89 02/05/2024    MCH 29.7 02/05/2024    MCHC 33.4 02/05/2024    RDW 12.1 02/05/2024    MPV 11.1 02/05/2024    NRBC 0 02/05/2024     Lab Results   Component Value Date    PTT 27 02/05/2024    INR 1.04 02/05/2024     Lab Results   Component Value Date    K 4.3 02/05/2024     02/05/2024    CO2 30 02/05/2024    BUN 18 02/05/2024    CREATININE 1.16 02/05/2024    EGFR 70 02/05/2024    CALCIUM 9.4 02/05/2024    ALKPHOS 43 02/05/2024    ALT 34 02/05/2024    AST 19 02/05/2024     Lab Results   Component Value Date    CRP <3.0 08/25/2021     Lab Results   Component Value Date    BLOODCX No Growth After 5 Days. 10/14/2017    SPUTUMCULTUR 3+ Growth of 10/15/2017    GRAMSTAIN Rare Epithelial cells per low power field 10/15/2017    GRAMSTAIN Rare Polys 10/15/2017    GRAMSTAIN Rare Gram positive cocci in pairs 10/15/2017         Counseling / Coordination of Care  Total floor / unit time spent today 20 minutes.  Greater than 50% of total time was spent with the patient and / or family counseling and / or coordination of care.        Marjorie Miles PA-C

## 2024-05-20 ENCOUNTER — OFFICE VISIT (OUTPATIENT)
Dept: PHYSICAL THERAPY | Facility: CLINIC | Age: 54
End: 2024-05-20
Payer: COMMERCIAL

## 2024-05-20 ENCOUNTER — ANESTHESIA EVENT (OUTPATIENT)
Age: 54
End: 2024-05-20
Payer: COMMERCIAL

## 2024-05-20 DIAGNOSIS — Z96.651 HISTORY OF TOTAL RIGHT KNEE REPLACEMENT: ICD-10-CM

## 2024-05-20 DIAGNOSIS — M17.11 LOCALIZED OSTEOARTHRITIS OF RIGHT KNEE: Primary | ICD-10-CM

## 2024-05-20 PROCEDURE — 97140 MANUAL THERAPY 1/> REGIONS: CPT | Performed by: PHYSICAL THERAPIST

## 2024-05-20 PROCEDURE — 97110 THERAPEUTIC EXERCISES: CPT | Performed by: PHYSICAL THERAPIST

## 2024-05-20 NOTE — PROGRESS NOTES
"Daily Note     Today's date: 2024  Patient name: Clint Fierro III  : 1970  MRN: [id number]  Referring provider: Marjorie Miles PA-C  Dx:   Encounter Diagnosis     ICD-10-CM    1. Localized osteoarthritis of right knee  M17.11       2. History of total right knee replacement  Z96.651                      Subjective: Notes having continued pain with bending his knee, still with limited knee flexion.        Objective: See treatment diary below      Assessment: Noting 13 to 82 degrees of knee ROM Actively prior to any self stretch and mobilization.  Reviewed lateral distal knee pain related to ITB restriction and quad restriction.  Improved pain post pa stretch and trial of manual OBERs stretch.  Noting improved ability to perform leg press with deeper ROM with repeated activation.    Plan: Continue per plan of care.      POC EXPIRES On:  24  PRECAUTIONS:  None  CO-MORBIDITES:  None  PERSONAL FACTORS:  R TKA on 24  Access Code:  [email or web address]      Manuals 4/29 5/6 5/13 5/20    4/8 4/15 4/22 4/24   PROM R knee flex/ext JK/ renny 8' 15' Wdc 8' 10 min     Wdc 10' 10' Wdc 10 8'   Pa stretch and OBERs stretch    10 min           R H/S stretch              Ktape scar              IASTM 6' jk             R patellar mobs    3 min        2'   Neuro Re-Ed                                                                                                        Ther Ex     Bike for ROM 6' ROM 6' ROM 6' ROM 6 min      6' rock 5' pre  5' post 8' partial   Prone hangs R              Quad sets R 5\" 20 5\" 20 5\" 20 5x20\"     10x10\" 10 x 10'' 10x10   5\" 20   Supine strap H/S stretch R  20\" 3 20\" 3 seated 20\"3    10x10\" 10 x 10\"  20\" 5   Heel slides with strap R 10\" 10 2#       10x10\" 10 x 10\"   10\" 10   Supine 90/90 with towel              R LAQ 2# 30  2# 20 2# 20x     nv 20 2# 30   R SAQ    2# 20 2# 20x       2# 30   Seated R knee flex          10\" 20 OP    Ankle pumps R              Step stretch         nv   "   Std SLR x3 dir 2# 20 ea  2# 20 ea    2# 20x     nv 20 2# 20 ea   SLR flex supine R 2# 20  2# 20    2# 20x     2 x 10 20 2# 20   S/L SLR   2# 20              Prone SLR   2# 20              Ther Activity     Leg Press B   125# 20 165# lev 13      125# 20 165# 2x20   Leg Press SL   65# 20 65# 20x Lev 12      65# 20 65# 2x20   Sit to stand   20       20x 20   Gait Training                                 Modalities     CP R knee        10'  def    Lymph compression  45mmhg  15'      45mmhg 15'  def

## 2024-05-22 ENCOUNTER — APPOINTMENT (OUTPATIENT)
Dept: PHYSICAL THERAPY | Facility: CLINIC | Age: 54
End: 2024-05-22
Payer: COMMERCIAL

## 2024-05-29 ENCOUNTER — TELEPHONE (OUTPATIENT)
Dept: OBGYN CLINIC | Facility: HOSPITAL | Age: 54
End: 2024-05-29

## 2024-05-29 ENCOUNTER — APPOINTMENT (OUTPATIENT)
Dept: PHYSICAL THERAPY | Facility: CLINIC | Age: 54
End: 2024-05-29
Payer: COMMERCIAL

## 2024-05-29 NOTE — ANESTHESIA PREPROCEDURE EVALUATION
Procedure:  MANIPULATION JOINT KNEE (Right: Knee)     - denies any chest pain, palpitations, shortness of breath, syncope, lightheadedness, seizures   - denies any recent infectious symptoms such as fevers, chills, cough   - denies taking any anticoagulation medications or any issues with bleeding, bruising, clotting    Relevant Problems   ANESTHESIA (within normal limits)      CARDIO (within normal limits)      ENDO (within normal limits)      GI/HEPATIC (within normal limits)      /RENAL (within normal limits)      GYN (within normal limits)      HEMATOLOGY (within normal limits)      MUSCULOSKELETAL   (+) Patellar tendinitis of left knee   (+) Post-traumatic osteoarthritis of right knee   (+) Primary osteoarthritis of left knee      NEURO/PSYCH (within normal limits)      PULMONARY (within normal limits)      Lab Results   Component Value Date    WBC 5.92 02/05/2024    HGB 15.3 02/05/2024    HCT 45.8 02/05/2024    MCV 89 02/05/2024     02/05/2024     Lab Results   Component Value Date    SODIUM 141 02/05/2024    K 4.3 02/05/2024     02/05/2024    CO2 30 02/05/2024    AGAP 6 02/05/2024    BUN 18 02/05/2024    CREATININE 1.16 02/05/2024    GLUC 94 10/15/2017    GLUF 92 02/05/2024    CALCIUM 9.4 02/05/2024    AST 19 02/05/2024    ALT 34 02/05/2024    ALKPHOS 43 02/05/2024    TP 7.0 02/05/2024    TBILI 0.37 02/05/2024    EGFR 70 02/05/2024     Lab Results   Component Value Date    PTT 27 02/05/2024     Lab Results   Component Value Date    INR 1.04 02/05/2024    INR 1.07 10/14/2017    PROTIME 14.1 02/05/2024    PROTIME 13.7 10/14/2017       Physical Exam    Airway    Mallampati score: II  TM Distance: >3 FB  Neck ROM: full     Dental   No notable dental hx     Cardiovascular  Rhythm: regular, Rate: normal, Cardiovascular exam normal    Pulmonary  Pulmonary exam normal Breath sounds clear to auscultation    Other Findings        Anesthesia Plan  ASA Score- 2     Anesthesia Type- general with ASA  Monitors.         Additional Monitors:     Airway Plan: LMA.    Comment: GA with LMA, ETT as backup, preoperative adductor canal and IPACK nerve blocks.       Plan Factors-Exercise tolerance (METS): >4 METS.    Chart reviewed. EKG reviewed. Imaging results reviewed. Existing labs reviewed. Patient summary reviewed.    Patient is not a current smoker.  Patient did not smoke on day of surgery.    Obstructive sleep apnea risk education given perioperatively.        Induction- intravenous.    Postoperative Plan- Plan for postoperative opioid use.         Informed Consent- Anesthetic plan and risks discussed with patient.  I personally reviewed this patient with the CRNA. Discussed and agreed on the Anesthesia Plan with the CRNA..

## 2024-05-29 NOTE — DISCHARGE INSTR - AVS FIRST PAGE
Dr. Delgado Knee Replacement    What to Expect/Activity  It is normal to have some discomfort in your knee for several days to weeks.  You are weight bearing as tolerated to your operative leg with assist devices.  Please use crutches/walker when ambulating until your follow-up  Swelling and discomfort in the knee is normal for several days after surgery. For the first 2-3 days, use ice around the knee to help. Use for 20-30 minutes every 1-2 hours for 48 hours, while awake. You may continue beyond 48 hours as needed.  Place one or two pillows underneath your calf, not your knee, to reduce swelling.  Physical therapy on your own at home should start as soon as possible (see below). Please perform heel slides and extension exercises on your own as well (see diagram).  Please use incentive spirometer 10 times per hour while awake (see diagram).      Pain Management/Medications  You may resume your usual medications.  Please take the following medications:  Pain medication:  Narcotic: Take as directed  NSAID/Anti-inflammatory: Take as directed  Tylenol 1000mg every 8 hours  Zofran (ondasetron) - 4mg every 8 hours as needed for nausea  Stool softeners (senna/colace) - take daily to prevent constipation as narcotic pain medication causes constipation  Antibiotic - take as directed if prescribed   If you have questions or pain concerns, please contact the office. Pain medication cannot remove all post-operative pain.    Follow up/Call if:  The findings of your surgery will be explained to you and your family immediately after surgery. However, in the post-operative period, during recovery from anesthesia you may not fully remember or fully understand what was said. This will be again gone over when you return for your post-op appointment.  Please contact Dr. Delgado's office if you experience the following:  Excessive bleeding (bleeding through your dressing)  Fever greater than 101 degrees F after 48 hours (low grade  fevers the day or two after surgery are normal)  Persistent nausea or vomiting  Decreased sensation or discoloration of the operative limb  Pain or swelling that is getting worse and not better with medication    Dr. Delgado's Office Contact: [phone]

## 2024-05-29 NOTE — TELEPHONE ENCOUNTER
Caller: Patient's spouse Rosaura    Doctor: Danny    Reason for call: Rosaura is calling with spouse Clint to see how long surgery will be tomorrow as well as time so they can plan for transportation back and forth. I did advise they will be called later with the time of surgery but they had some questions as to how long he might be there. Please call spouse Rosaura. Thank you.    Call back#: [phone]

## 2024-05-30 ENCOUNTER — HOSPITAL ENCOUNTER (OUTPATIENT)
Age: 54
Setting detail: OUTPATIENT SURGERY
Discharge: HOME | End: 2024-05-30
Attending: STUDENT IN AN ORGANIZED HEALTH CARE EDUCATION/TRAINING PROGRAM | Admitting: STUDENT IN AN ORGANIZED HEALTH CARE EDUCATION/TRAINING PROGRAM
Payer: COMMERCIAL

## 2024-05-30 ENCOUNTER — TELEPHONE (OUTPATIENT)
Age: 54
End: 2024-05-30

## 2024-05-30 ENCOUNTER — ANESTHESIA (OUTPATIENT)
Age: 54
End: 2024-05-30
Payer: COMMERCIAL

## 2024-05-30 VITALS
OXYGEN SATURATION: 94 % | TEMPERATURE: 97.2 F | SYSTOLIC BLOOD PRESSURE: 144 MMHG | WEIGHT: 255 LBS | HEART RATE: 55 BPM | DIASTOLIC BLOOD PRESSURE: 86 MMHG | RESPIRATION RATE: 22 BRPM | BODY MASS INDEX: 34.54 KG/M2 | HEIGHT: 72 IN

## 2024-05-30 DIAGNOSIS — M17.31 POST-TRAUMATIC OSTEOARTHRITIS OF RIGHT KNEE: Primary | ICD-10-CM

## 2024-05-30 DIAGNOSIS — M24.661 ARTHROFIBROSIS OF KNEE JOINT, RIGHT: ICD-10-CM

## 2024-05-30 PROCEDURE — C9290 INJ, BUPIVACAINE LIPOSOME: HCPCS | Performed by: ANESTHESIOLOGY

## 2024-05-30 PROCEDURE — 27570 FIXATION OF KNEE JOINT: CPT | Performed by: STUDENT IN AN ORGANIZED HEALTH CARE EDUCATION/TRAINING PROGRAM

## 2024-05-30 RX ORDER — ONDANSETRON 4 MG/1
4 TABLET, ORALLY DISINTEGRATING ORAL EVERY 6 HOURS PRN
Qty: 20 TABLET | Refills: 0 | Status: SHIPPED | OUTPATIENT
Start: 2024-05-30

## 2024-05-30 RX ORDER — ACETAMINOPHEN 500 MG
1000 TABLET ORAL EVERY 8 HOURS
Qty: 60 TABLET | Refills: 0 | Status: SHIPPED | OUTPATIENT
Start: 2024-05-30

## 2024-05-30 RX ORDER — FENTANYL CITRATE/PF 50 MCG/ML
25 SYRINGE (ML) INJECTION
Status: DISCONTINUED | OUTPATIENT
Start: 2024-05-30 | End: 2024-05-30 | Stop reason: HOSPADM

## 2024-05-30 RX ORDER — BUPIVACAINE HYDROCHLORIDE 5 MG/ML
INJECTION, SOLUTION EPIDURAL; INTRACAUDAL
Status: COMPLETED | OUTPATIENT
Start: 2024-05-30 | End: 2024-05-30

## 2024-05-30 RX ORDER — PROPOFOL 10 MG/ML
INJECTION, EMULSION INTRAVENOUS AS NEEDED
Status: DISCONTINUED | OUTPATIENT
Start: 2024-05-30 | End: 2024-05-30

## 2024-05-30 RX ORDER — DEXAMETHASONE SODIUM PHOSPHATE 10 MG/ML
INJECTION, SOLUTION INTRAMUSCULAR; INTRAVENOUS AS NEEDED
Status: DISCONTINUED | OUTPATIENT
Start: 2024-05-30 | End: 2024-05-30

## 2024-05-30 RX ORDER — ONDANSETRON 2 MG/ML
4 INJECTION INTRAMUSCULAR; INTRAVENOUS ONCE AS NEEDED
Status: DISCONTINUED | OUTPATIENT
Start: 2024-05-30 | End: 2024-05-30 | Stop reason: HOSPADM

## 2024-05-30 RX ORDER — CELECOXIB 200 MG/1
200 CAPSULE ORAL 2 TIMES DAILY
Qty: 60 CAPSULE | Refills: 0 | Status: SHIPPED | OUTPATIENT
Start: 2024-05-30

## 2024-05-30 RX ORDER — SODIUM CHLORIDE, SODIUM LACTATE, POTASSIUM CHLORIDE, CALCIUM CHLORIDE 600; 310; 30; 20 MG/100ML; MG/100ML; MG/100ML; MG/100ML
INJECTION, SOLUTION INTRAVENOUS CONTINUOUS PRN
Status: DISCONTINUED | OUTPATIENT
Start: 2024-05-30 | End: 2024-05-30

## 2024-05-30 RX ORDER — MIDAZOLAM HYDROCHLORIDE 2 MG/2ML
INJECTION, SOLUTION INTRAMUSCULAR; INTRAVENOUS AS NEEDED
Status: DISCONTINUED | OUTPATIENT
Start: 2024-05-30 | End: 2024-05-30

## 2024-05-30 RX ORDER — OXYCODONE HYDROCHLORIDE 5 MG/1
5 TABLET ORAL EVERY 4 HOURS PRN
Qty: 42 TABLET | Refills: 0 | Status: SHIPPED | OUTPATIENT
Start: 2024-05-30 | End: 2024-06-09

## 2024-05-30 RX ORDER — ONDANSETRON 2 MG/ML
INJECTION INTRAMUSCULAR; INTRAVENOUS AS NEEDED
Status: DISCONTINUED | OUTPATIENT
Start: 2024-05-30 | End: 2024-05-30

## 2024-05-30 RX ORDER — LIDOCAINE HYDROCHLORIDE 10 MG/ML
INJECTION, SOLUTION EPIDURAL; INFILTRATION; INTRACAUDAL; PERINEURAL AS NEEDED
Status: DISCONTINUED | OUTPATIENT
Start: 2024-05-30 | End: 2024-05-30

## 2024-05-30 RX ORDER — SODIUM CHLORIDE, SODIUM LACTATE, POTASSIUM CHLORIDE, CALCIUM CHLORIDE 600; 310; 30; 20 MG/100ML; MG/100ML; MG/100ML; MG/100ML
100 INJECTION, SOLUTION INTRAVENOUS CONTINUOUS
Status: CANCELLED | OUTPATIENT
Start: 2024-05-30

## 2024-05-30 RX ORDER — OXYCODONE HYDROCHLORIDE 5 MG/1
5 TABLET ORAL EVERY 4 HOURS PRN
Status: DISCONTINUED | OUTPATIENT
Start: 2024-05-30 | End: 2024-05-30 | Stop reason: HOSPADM

## 2024-05-30 RX ORDER — HYDROMORPHONE HCL IN WATER/PF 6 MG/30 ML
0.2 PATIENT CONTROLLED ANALGESIA SYRINGE INTRAVENOUS
Status: DISCONTINUED | OUTPATIENT
Start: 2024-05-30 | End: 2024-05-30 | Stop reason: HOSPADM

## 2024-05-30 RX ORDER — ACETAMINOPHEN 325 MG/1
975 TABLET ORAL ONCE
Status: COMPLETED | OUTPATIENT
Start: 2024-05-30 | End: 2024-05-30

## 2024-05-30 RX ORDER — ONDANSETRON 2 MG/ML
4 INJECTION INTRAMUSCULAR; INTRAVENOUS EVERY 6 HOURS PRN
Status: DISCONTINUED | OUTPATIENT
Start: 2024-05-30 | End: 2024-05-30 | Stop reason: HOSPADM

## 2024-05-30 RX ORDER — ACETAMINOPHEN 325 MG/1
975 TABLET ORAL EVERY 8 HOURS
Status: DISCONTINUED | OUTPATIENT
Start: 2024-05-30 | End: 2024-05-30 | Stop reason: HOSPADM

## 2024-05-30 RX ORDER — FENTANYL CITRATE 50 UG/ML
INJECTION, SOLUTION INTRAMUSCULAR; INTRAVENOUS AS NEEDED
Status: DISCONTINUED | OUTPATIENT
Start: 2024-05-30 | End: 2024-05-30

## 2024-05-30 RX ORDER — SODIUM CHLORIDE, SODIUM LACTATE, POTASSIUM CHLORIDE, CALCIUM CHLORIDE 600; 310; 30; 20 MG/100ML; MG/100ML; MG/100ML; MG/100ML
125 INJECTION, SOLUTION INTRAVENOUS CONTINUOUS
Status: DISCONTINUED | OUTPATIENT
Start: 2024-05-30 | End: 2024-05-30 | Stop reason: HOSPADM

## 2024-05-30 RX ADMIN — MIDAZOLAM 2 MG: 1 INJECTION INTRAMUSCULAR; INTRAVENOUS at 07:05

## 2024-05-30 RX ADMIN — HYDROMORPHONE HYDROCHLORIDE 0.2 MG: 0.2 INJECTION, SOLUTION INTRAMUSCULAR; INTRAVENOUS; SUBCUTANEOUS at 08:09

## 2024-05-30 RX ADMIN — SODIUM CHLORIDE, SODIUM LACTATE, POTASSIUM CHLORIDE, AND CALCIUM CHLORIDE 125 ML/HR: .6; .31; .03; .02 INJECTION, SOLUTION INTRAVENOUS at 06:33

## 2024-05-30 RX ADMIN — PROPOFOL 200 MG: 10 INJECTION, EMULSION INTRAVENOUS at 07:17

## 2024-05-30 RX ADMIN — HYDROMORPHONE HYDROCHLORIDE 0.2 MG: 0.2 INJECTION, SOLUTION INTRAMUSCULAR; INTRAVENOUS; SUBCUTANEOUS at 08:20

## 2024-05-30 RX ADMIN — FENTANYL CITRATE 25 MCG: 50 INJECTION, SOLUTION INTRAMUSCULAR; INTRAVENOUS at 07:50

## 2024-05-30 RX ADMIN — FENTANYL CITRATE 25 MCG: 50 INJECTION, SOLUTION INTRAMUSCULAR; INTRAVENOUS at 07:23

## 2024-05-30 RX ADMIN — FENTANYL CITRATE 25 MCG: 50 INJECTION, SOLUTION INTRAMUSCULAR; INTRAVENOUS at 07:55

## 2024-05-30 RX ADMIN — BUPIVACAINE HYDROCHLORIDE 10 ML: 5 INJECTION, SOLUTION EPIDURAL; INTRACAUDAL; PERINEURAL at 07:05

## 2024-05-30 RX ADMIN — HYDROMORPHONE HYDROCHLORIDE 0.2 MG: 0.2 INJECTION, SOLUTION INTRAMUSCULAR; INTRAVENOUS; SUBCUTANEOUS at 07:58

## 2024-05-30 RX ADMIN — FENTANYL CITRATE 50 MCG: 50 INJECTION, SOLUTION INTRAMUSCULAR; INTRAVENOUS at 07:05

## 2024-05-30 RX ADMIN — FENTANYL CITRATE 25 MCG: 50 INJECTION, SOLUTION INTRAMUSCULAR; INTRAVENOUS at 07:37

## 2024-05-30 RX ADMIN — OXYCODONE 5 MG: 5 TABLET ORAL at 08:34

## 2024-05-30 RX ADMIN — FENTANYL CITRATE 25 MCG: 50 INJECTION, SOLUTION INTRAMUSCULAR; INTRAVENOUS at 07:45

## 2024-05-30 RX ADMIN — SODIUM CHLORIDE, SODIUM LACTATE, POTASSIUM CHLORIDE, AND CALCIUM CHLORIDE: .6; .31; .03; .02 INJECTION, SOLUTION INTRAVENOUS at 07:13

## 2024-05-30 RX ADMIN — BUPIVACAINE 20 ML: 13.3 INJECTION, SUSPENSION, LIPOSOMAL INFILTRATION at 07:05

## 2024-05-30 RX ADMIN — DEXAMETHASONE SODIUM PHOSPHATE 10 MG: 10 INJECTION, SOLUTION INTRAMUSCULAR; INTRAVENOUS at 07:17

## 2024-05-30 RX ADMIN — ACETAMINOPHEN 325MG 975 MG: 325 TABLET ORAL at 06:29

## 2024-05-30 RX ADMIN — ONDANSETRON 4 MG: 2 INJECTION INTRAMUSCULAR; INTRAVENOUS at 07:17

## 2024-05-30 RX ADMIN — LIDOCAINE HYDROCHLORIDE 50 MG: 10 INJECTION, SOLUTION EPIDURAL; INFILTRATION; INTRACAUDAL; PERINEURAL at 07:17

## 2024-05-30 NOTE — OP NOTE
OPERATIVE REPORT  PATIENT NAME: Clint Fierro III    :  1970  MRN: [id number]  Pt Location: [facility] OR ROOM 04    SURGERY DATE: 2024    Surgeons and Role:     * Christiano Delgado, DO - Primary    Preop Diagnosis:  Aftercare following right knee joint replacement surgery [Z47.1, Z96.651]  Arthrofibrosis of knee joint, right [M24.661]    Post-Op Diagnosis Codes:     * Aftercare following right knee joint replacement surgery [Z47.1, Z96.651]     * Arthrofibrosis of knee joint, right [M24.661]    Procedure(s):  Right - MANIPULATION JOINT KNEE    Specimen(s):  * No specimens in log *    Estimated Blood Loss:   Minimal    Drains:  * No LDAs found *    Anesthesia Type:   LMA, peripheral nerve block     Operative Indications:  Aftercare following right knee joint replacement surgery [Z47.1, Z96.651]  Arthrofibrosis of knee joint, right [M24.661]  Patient is a 54-year-old male who is status post right total knee arthroplasty on 2024.  Patient has struggled with his motion postoperatively with diligent physical therapy.  His motion is essentially 0-75.  Due to his limitations of flexion he is indicated for right knee manipulation under anesthesia with then aggressive physical therapy continued.  Risk and benefits were discussed with the patient to include fracture, loosening, continued stiffness.  Patient was accepting of risks and elected to proceed to the operating room.    Operative Findings:  Premanipulation: 0-75  Postmanipulation: 0-120 gravity assisted    Complications:   None    Procedure and Technique:    Patient was seen in the preoperative holding area and the right lower extremity was marked as the correct site.  An adductor canal and iPAQ block with Exparel was performed by the anesthesia staff.  Patient was brought back to the operating room and general anesthesia was induced with LMA.  Once patient was sedated we checked his range of motion.  He was 0-75 gravity assisted.  We performed a  gentle manipulation into flexion with audible and palpable breaking up of scar tissue.  We were able to obtain 120 degrees of gravity assisted flexion.  At this time the patient was awoken from anesthesia and taken the recovery room in stable condition.     I was present for the entire procedure.    Patient Disposition:  PACU         SIGNATURE: Christiano Delgado DO  DATE: May 30, 2024  TIME: 7:34 AM

## 2024-05-30 NOTE — TELEPHONE ENCOUNTER
Caller: Patient    Doctor: Danny    Reason for call:     Patient is calling about the scripts for after his surgery, confirmed they were called into the [facility] this am.      Call back#: n/a

## 2024-05-30 NOTE — INTERVAL H&P NOTE
H&P reviewed. After examining the patient I find no changes in the patients condition since the H&P had been written. Plan for right knee JOHN.

## 2024-05-30 NOTE — ANESTHESIA PROCEDURE NOTES
Peripheral Block    Patient location during procedure: holding area  Start time: 5/30/2024 7:05 AM  Reason for block: at surgeon's request and post-op pain management  Staffing  Performed by: Paco Valle MD  Authorized by: Paco Valle MD    Preanesthetic Checklist  Completed: patient identified, IV checked, site marked, risks and benefits discussed, surgical consent, monitors and equipment checked, pre-op evaluation and timeout performed  Peripheral Block  Patient position: supine  Prep: ChloraPrep  Patient monitoring: frequent blood pressure checks, continuous pulse oximetry and heart rate  Block type: Adductor Canal  Laterality: right  Injection technique: single-shot  Procedures: ultrasound guided, Ultrasound guidance required for the procedure to increase accuracy and safety of medication placement and decrease risk of complications.  Ultrasound permanent image saved  bupivacaine liposomal (EXPAREL) 1.3 % injection 20 mL - Perineural   20 mL - 5/30/2024 7:05:00 AM  bupivacaine (PF) (MARCAINE) 0.5 % injection 20 mL - Perineural   10 mL - 5/30/2024 7:05:00 AM  Needle  Needle type: Stimuplex   Needle gauge: 20 G  Needle length: 4 in  Needle localization: anatomical landmarks and ultrasound guidance  Assessment  Injection assessment: incremental injection, frequent aspiration, injected with ease, negative aspiration, negative for heart rate change, no paresthesia on injection, no symptoms of intraneural/intravenous injection and needle tip visualized at all times  Paresthesia pain: none  Post-procedure:  site cleaned  patient tolerated the procedure well with no immediate complications

## 2024-06-03 ENCOUNTER — EVALUATION (OUTPATIENT)
Dept: PHYSICAL THERAPY | Facility: CLINIC | Age: 54
End: 2024-06-03
Payer: COMMERCIAL

## 2024-06-03 DIAGNOSIS — Z96.651 HISTORY OF TOTAL RIGHT KNEE REPLACEMENT: ICD-10-CM

## 2024-06-03 DIAGNOSIS — M17.11 LOCALIZED OSTEOARTHRITIS OF RIGHT KNEE: Primary | ICD-10-CM

## 2024-06-03 PROCEDURE — 97110 THERAPEUTIC EXERCISES: CPT | Performed by: PHYSICAL THERAPIST

## 2024-06-03 PROCEDURE — 97140 MANUAL THERAPY 1/> REGIONS: CPT | Performed by: PHYSICAL THERAPIST

## 2024-06-03 NOTE — PROGRESS NOTES
PT Re-evaluation    Today's date: 6/3/2024  Patient name: Clint Fierro III  : 1970  MRN: [id number]  Referring provider: Marjorie Miles PA-C  Dx:   Encounter Diagnosis     ICD-10-CM    1. Localized osteoarthritis of right knee  M17.11       2. History of total right knee replacement  Z96.651                          Assessment  Impairments: abnormal gait, abnormal or restricted ROM, activity intolerance, impaired balance, impaired physical strength and pain with function  Symptom irritability: low    Assessment details: Clint Fierro III is a 54 y.o. male who presented to outpatient physical therapy at [facility] with complaints of R knee pain.  He presented with decreased R>L knee range of motion, decreased B LE strength, limited flexibility, altered gait pattern, poor balance, decreased tolerance to activity and decreased functional mobility following R TKA on 24 due to Post-traumatic osteoarthritis of right knee.  His progression in PT was poor so he had a manipulation performed on 24 with excellent gains in R knee ROM with much less pain.  He will continue to benefit from skilled PT services in order to address these deficits and reach maximum level of function.  He needs aggressive ROM for his R knee this week.  Thank you for the referral!  Barriers to therapy: None  Understanding of Dx/Px/POC: excellent     Prognosis: excellent    Goals  ST.  Independent with HEP in 2 weeks - Met  2.  Increase R knee PROM to 0-100 degrees in 4 weeks - Mostly Met   3.  Normal gait pattern with SPC for community distances in 4 weeks - Met    LT.  Achieve FOTO score of 67/100 in 8 weeks - Mostly Met  2.  Able to ambulate on all surfaces safely x 20 min in 8 weeks - Met  3.  Strength = 5/5 all R LE motions in 8 weeks - Partially Met  4.  No tightness in R thigh in 8 weeks - Mostly Met  5.  Excellent R LE balance in 8 weeks - Mostly Met  6.  No R LE swelling in 8 weeks - Mostly Met  7.  RTW full  duty in 8 weeks - Met  NEW GOALS (6/3/24)  1.  Able to ride a bike in 8 weeks    Plan  Patient would benefit from: skilled PT  Planned modality interventions: cryotherapy, electrical stimulation/Russian stimulation, TENS and thermotherapy: hydrocollator packs    Planned therapy interventions: ADL retraining, balance/weight bearing training, flexibility, functional ROM exercises, home exercise program, joint mobilization, manual therapy, neuromuscular re-education, postural training, strengthening, stretching, therapeutic activities, therapeutic exercise and gait training    Frequency: 2x week  Duration in weeks: 8  Treatment plan discussed with: patient        Subjective Evaluation    History of Present Illness  Mechanism of injury: Pt reports having R>L knee pain due to OA.  He received cortisone and visco injections into his R knee.  His last visco series was about 1 year ago with no improvement in his symptoms.  He tried oral NSAIDs as well as Tylenol with only minimal relief of symptoms.  Pain was localized to the medial aspect of his R knee but also posterior-lateral at times.  His pain was limiting his activities he enjoys such as hiking and walking.  It was also affecting his job as he is very active and walks a lot for his job and he struggles with pain after working.  On 24 he had R TKA.   Hoping to have L TKA in 2024.  He had difficulty gaining full R knee mobility back and thus had a manipulation on 24 and feels his ROM is much better with much less pain now aside from min R quadriceps tightness.          Recurrent probem    Quality of life: fair    Patient Goals  Patient goals for therapy: increased strength, independence with ADLs/IADLs, return to sport/leisure activities, increased motion, improved balance, decreased pain and decreased edema    Pain  Current pain rating: 3  At best pain ratin  At worst pain ratin  Quality: tight and dull ache  Relieving factors: rest, ice and  medications  Aggravating factors: stair climbing, walking, standing and lifting  Progression: improved    Social Support  Lives with: spouse and young children    Employment status: working  Treatments  Previous treatment: medication, injection treatment and physical therapy  Current treatment: medication and physical therapy        Objective     Observations   Left Knee   Positive for effusion.     Right Knee   Positive for effusion.     Additional Observation Details  2 cm swelling R knee > L.     Palpation     Additional Palpation Details  Min tightness B H/S.    Tenderness   Left Knee   Tenderness in the lateral joint line and medial joint line.     Right Knee   No tenderness in the medial joint line.     Neurological Testing     Additional Neurological Details  Chronic numbness B lateral thighs.    Active Range of Motion   Left Knee   Flexion: 109 degrees   Extension: -8 degrees   Extensor la degrees     Right Knee   Flexion: 88 degrees with pain  Extension: -13 degrees with pain  Extensor la degrees     Passive Range of Motion   Left Knee   Flexion: 110 degrees with pain  Extension: -6 degrees     Right Knee   Flexion: 96 degrees with pain  Extension: -5 degrees with pain    Strength/Myotome Testing     Left Hip   Planes of Motion   Flexion: 5    Right Hip   Planes of Motion   Flexion: 4+    Left Knee   Flexion: 4+  Extension: 4  Quadriceps contraction: fair    Right Knee   Flexion: 4+  Extension: 4-  Quadriceps contraction: poor    Ambulation     Ambulation: Level Surfaces   Ambulation without assistive device: independent    Ambulation: Stairs   Ascend stairs: independent  Pattern: non-reciprocal  Railings: one rail  Descend stairs: independent  Pattern: non-reciprocal  Railings: one rail  Curbs: independent    Observational Gait   Decreased walking speed.     Additional Observational Gait Details  Mod genu varus L.     Comments   Fair L LE balance.  Good balance on R LE.      POC EXPIRES On:   "7/29/24  PRECAUTIONS:  None  CO-MORBIDITES:  None  PERSONAL FACTORS:  R TKA on 2/29/24  Access Code:  [email or web address]      Manuals 4/29 5/6 5/13 5/20 6/3         PROM R knee flex/ext JK/ renny 8' 15' Wdc 8' 10 min  25'         Pa stretch and OBERs stretch    10 min           R H/S stretch              Ktape scar              IASTM 6' jk             R patellar mobs    3 min           Neuro Re-Ed                                                                                                        Ther Ex     Bike for ROM 6' ROM 6' ROM 6' ROM 6 min  8'         Prone hangs R              Quad sets R 5\" 20 5\" 20 5\" 20 5x20\"           Supine strap H/S stretch R  20\" 3 20\" 3 seated 20\"3          Heel slides with strap R 10\" 10 2#             Supine 90/90 with towel              R LAQ 2# 30  2# 20 2# 20x          R SAQ    2# 20 2# 20x          Seated R knee flex              Ankle pumps R              Step stretch              Std SLR x3 dir 2# 20 ea  2# 20 ea    2# 20x          SLR flex supine R 2# 20  2# 20    2# 20x          S/L SLR   2# 20              Prone SLR   2# 20              Ther Activity     Leg Press B   125# 20 165# lev 13          Leg Press SL   65# 20 65# 20x Lev 12          Sit to stand   20           Gait Training                                 Modalities     CP R knee              Lymph compression  45mmhg  15'                                  "

## 2024-06-04 ENCOUNTER — TELEPHONE (OUTPATIENT)
Age: 54
End: 2024-06-04

## 2024-06-04 NOTE — TELEPHONE ENCOUNTER
Caller: Clint    Doctor: Danny    Reason for call: Patient's payment for his Xray  was denied due to no referral number.  The referral is dated 1/23/2024. Please resubmit with a referral number.  Please advise patient once completed  TY    Call back#: [phone]

## 2024-06-05 ENCOUNTER — APPOINTMENT (OUTPATIENT)
Dept: PHYSICAL THERAPY | Facility: CLINIC | Age: 54
End: 2024-06-05
Payer: COMMERCIAL

## 2024-06-05 NOTE — TELEPHONE ENCOUNTER
Called spoke to patient gave him referral number [id number] that was connected to appointment 1/23/2024.

## 2024-06-06 ENCOUNTER — OFFICE VISIT (OUTPATIENT)
Dept: PHYSICAL THERAPY | Facility: CLINIC | Age: 54
End: 2024-06-06
Payer: COMMERCIAL

## 2024-06-06 ENCOUNTER — APPOINTMENT (OUTPATIENT)
Dept: PHYSICAL THERAPY | Facility: CLINIC | Age: 54
End: 2024-06-06
Payer: COMMERCIAL

## 2024-06-06 DIAGNOSIS — Z96.651 HISTORY OF TOTAL RIGHT KNEE REPLACEMENT: ICD-10-CM

## 2024-06-06 DIAGNOSIS — M17.11 LOCALIZED OSTEOARTHRITIS OF RIGHT KNEE: Primary | ICD-10-CM

## 2024-06-06 PROCEDURE — 97140 MANUAL THERAPY 1/> REGIONS: CPT | Performed by: PHYSICAL THERAPIST

## 2024-06-06 PROCEDURE — 97110 THERAPEUTIC EXERCISES: CPT | Performed by: PHYSICAL THERAPIST

## 2024-06-06 NOTE — PROGRESS NOTES
Daily Note     Today's date: 2024  Patient name: Clint Fierro III  : 1970  MRN: [id number]  Referring provider: Marjorie Miles PA-C  Dx:   Encounter Diagnosis     ICD-10-CM    1. Localized osteoarthritis of right knee  M17.11       2. History of total right knee replacement  Z96.651                      Subjective:  Pt reports feeling much stiffer again today.  Knee won't bend or straighten as well as a few days ago in PT.       Objective:  See treatment diary below      Assessment:  Pt presented to outpatient physical therapy at [facility] with complaints of R knee pain.  He presented with decreased R>L knee range of motion, decreased B LE strength, limited flexibility, altered gait pattern, poor balance, decreased tolerance to activity and decreased functional mobility following R TKA on 24 due to Post-traumatic osteoarthritis of right knee.  His progression in PT was poor so he had a manipulation performed on 24 with excellent gains in R knee ROM with much less pain.  He had min more restriction in flexion ROM today but with manual tx was able to return to the same flexion ROM as last session.  He needs to continue to work on ROM every day at home.  He will continue to benefit from skilled PT services in order to address these deficits and reach maximum level of function.  He needs continued aggressive ROM for his R knee.  Able to complete full bike revolutions today with some discomfort.       Plan:  Continue to progress ROM R knee.        POC EXPIRES On:  24  PRECAUTIONS:  None  CO-MORBIDITES:  None  PERSONAL FACTORS:  R TKA on 24  Access Code:  [email or web address]      Manuals  6/3 6/6        PROM R knee flex/ext JK/ renny 8' 15' Wdc 8' 10 min  25' 24'        Pa stretch and OBERs stretch    10 min   5'        R H/S stretch              Ktape scar              IASTM 6' jk             R patellar mobs    3 min   3'        Neuro Re-Ed                                     "                                                                    Ther Ex     Bike for ROM 6' ROM 6' ROM 6' ROM 6 min  8' 8'        Prone hangs R              Quad sets R 5\" 20 5\" 20 5\" 20 5x20\"           Supine strap H/S stretch R  20\" 3 20\" 3 seated 20\"3          Heel slides with strap R 10\" 10 2#             Supine 90/90 with towel              R LAQ 2# 30  2# 20 2# 20x          R SAQ    2# 20 2# 20x          Seated R knee flex              Ankle pumps R              Step stretch              Std SLR x3 dir 2# 20 ea  2# 20 ea    2# 20x          SLR flex supine R 2# 20  2# 20    2# 20x          S/L SLR   2# 20              Prone SLR   2# 20              Ther Activity     Leg Press B   125# 20 165# lev 13          Leg Press SL   65# 20 65# 20x Lev 12          Sit to stand   20           Gait Training                                 Modalities     CP R knee              Lymph compression  45mmhg  15'                                    "

## 2024-06-10 ENCOUNTER — APPOINTMENT (OUTPATIENT)
Dept: PHYSICAL THERAPY | Facility: CLINIC | Age: 54
End: 2024-06-10
Payer: COMMERCIAL

## 2024-06-12 ENCOUNTER — APPOINTMENT (OUTPATIENT)
Dept: PHYSICAL THERAPY | Facility: CLINIC | Age: 54
End: 2024-06-12
Payer: COMMERCIAL

## 2024-06-14 ENCOUNTER — OFFICE VISIT (OUTPATIENT)
Dept: OBGYN CLINIC | Facility: CLINIC | Age: 54
End: 2024-06-14

## 2024-06-14 VITALS
DIASTOLIC BLOOD PRESSURE: 88 MMHG | WEIGHT: 255 LBS | HEART RATE: 91 BPM | SYSTOLIC BLOOD PRESSURE: 129 MMHG | HEIGHT: 72 IN | BODY MASS INDEX: 34.54 KG/M2

## 2024-06-14 DIAGNOSIS — Z47.1 AFTERCARE FOLLOWING RIGHT KNEE JOINT REPLACEMENT SURGERY: Primary | ICD-10-CM

## 2024-06-14 DIAGNOSIS — Z96.651 AFTERCARE FOLLOWING RIGHT KNEE JOINT REPLACEMENT SURGERY: Primary | ICD-10-CM

## 2024-06-14 PROCEDURE — 99024 POSTOP FOLLOW-UP VISIT: CPT | Performed by: PHYSICIAN ASSISTANT

## 2024-06-14 NOTE — PROGRESS NOTES
Subjective:Patient seen and examined. Pain controlled.  He states that he is doing extremely well following his JOHN and he has much less pain. Progressing well. He states that at his last PT session he was able to get around on the bike, which has been the first time in many years.  He also states that he has been able to ambulate down the stairs reciprocally.  He is very pleased with his progress. Denies fevers or chills    Physical Exam:  Incision: well healed  ROM: 5-95* without pain.   5/5 IP/Q/HS/TA/GS, 2+ DP/PT, SILT DP/SP/S/S/TN      Assessment/Plan:  53 y/o male doing well 2 weeks s/p Right knee JOHN following a TKA on 2/29/24.    - continue multi-modal pain control   - Weight bearing status: WBAT RLE  - DVT ppx: complete  - Incision care: no restrictions  - PT/OT, continue working in PT as well as HEP on his motion  - F/U 4 weeks for recheck.  Will discuss Left TKA at this visit.        Marjorie Miles PA-C

## 2024-06-17 ENCOUNTER — OFFICE VISIT (OUTPATIENT)
Dept: PHYSICAL THERAPY | Facility: CLINIC | Age: 54
End: 2024-06-17
Payer: COMMERCIAL

## 2024-06-17 DIAGNOSIS — Z96.651 HISTORY OF TOTAL RIGHT KNEE REPLACEMENT: ICD-10-CM

## 2024-06-17 DIAGNOSIS — M17.11 LOCALIZED OSTEOARTHRITIS OF RIGHT KNEE: Primary | ICD-10-CM

## 2024-06-17 PROCEDURE — 97110 THERAPEUTIC EXERCISES: CPT

## 2024-06-17 PROCEDURE — 97140 MANUAL THERAPY 1/> REGIONS: CPT

## 2024-06-17 NOTE — PROGRESS NOTES
"Daily Note     Today's date: 2024  Patient name: Clint Fierro III  : 1970  MRN: [id number]  Referring provider: Marjorie Miles PA-C  Dx:   Encounter Diagnosis     ICD-10-CM    1. Localized osteoarthritis of right knee  M17.11       2. History of total right knee replacement  Z96.651             Start Time: 1745  Stop Time: 1830  Total time in clinic (min): 45 minutes    Subjective:  Patient states he finally notices his knee straightening more. He has been participating in HEP often.      Objective:  See treatment diary below      Assessment:  {Patient tolerated treatment well. Patient encouraged to communicate pain levels t/o session. Good tolerance to manuals with some pain noted at end range. Fatigue noted post session. Patient would benefit from continued PT to inc knee mobility and strength.       Plan:  Continue to progress ROM R knee.        POC EXPIRES On:  24  PRECAUTIONS:  None  CO-MORBIDITES:  None  PERSONAL FACTORS:  R TKA on 24  Access Code:  [email or web address]      Manuals 4/29 5/6 5/13 5/20 6/3 6/6 67       PROM R knee flex/ext JK/ renny 8' 15' Wdc 8' 10 min  25' 24' 5       Pa stretch and OBERs stretch    10 min   5' 5       R H/S stretch              Ktape scar              IASTM 6' jk             R patellar mobs    3 min   3' 2       Neuro Re-Ed                                                                                                        Ther Ex     Bike for ROM 6' ROM 6' ROM 6' ROM 6 min  8' 8' 8' full ROM       Prone hangs R              Quad sets R 5\" 20 5\" 20 5\" 20 5x20\"    5x20\"        Supine strap H/S stretch R  20\" 3 20\" 3 seated 20\"3   20\"x3       Heel slides with strap R 10\" 10 2#             Supine 90/90 with towel              R LAQ 2# 30  2# 20 2# 20x   2# 20x       R SAQ    2# 20 2# 20x   2#20x       Seated R knee flex              Ankle pumps R              Step stretch              Std SLR x3 dir 2# 20 ea  2# 20 ea    2# 20x   2# 20x       SLR flex " supine R 2# 20  2# 20    2# 20x          S/L SLR   2# 20              Prone SLR   2# 20              Ther Activity     Leg Press B   125# 20 165# lev 13   165#  Lvl 10       Leg Press SL   65# 20 65# 20x Lev 12   75#  Lvl 10                             Sit to stand   20 20x          Gait Training                                 Modalities     CP R knee              Lymph compression  45mmhg  15'

## 2024-06-19 ENCOUNTER — OFFICE VISIT (OUTPATIENT)
Dept: PHYSICAL THERAPY | Facility: CLINIC | Age: 54
End: 2024-06-19
Payer: COMMERCIAL

## 2024-06-19 DIAGNOSIS — Z96.651 HISTORY OF TOTAL RIGHT KNEE REPLACEMENT: ICD-10-CM

## 2024-06-19 DIAGNOSIS — M17.11 LOCALIZED OSTEOARTHRITIS OF RIGHT KNEE: Primary | ICD-10-CM

## 2024-06-19 PROCEDURE — 97530 THERAPEUTIC ACTIVITIES: CPT | Performed by: PHYSICAL THERAPIST

## 2024-06-19 PROCEDURE — 97110 THERAPEUTIC EXERCISES: CPT | Performed by: PHYSICAL THERAPIST

## 2024-06-19 PROCEDURE — 97140 MANUAL THERAPY 1/> REGIONS: CPT | Performed by: PHYSICAL THERAPIST

## 2024-06-19 NOTE — PROGRESS NOTES
"Daily Note     Today's date: 2024  Patient name: Clint Fierro III  : 1970  MRN: [id number]  Referring provider: Marjorie Miles PA-C  Dx:   Encounter Diagnosis     ICD-10-CM    1. Localized osteoarthritis of right knee  M17.11       2. History of total right knee replacement  Z96.651                        Subjective:  Patient without c/o prior to treatment session.       Objective:  See treatment diary below  Knee flexion PROM 100 degrees in supine    Assessment:  Patient performed exercises without c/o pain; moderate pain with manual knee PROM.       Plan:  Continue to progress ROM R knee.        POC EXPIRES On:  24  PRECAUTIONS:  None  CO-MORBIDITES:  None  PERSONAL FACTORS:  R TKA on 24  Access Code:  [email or web address]      Manuals 4/29 5/6 5/13 5/20 6/3 6/6 6/17 6/19      PROM R knee flex/ext JK/ renny 8' 15' Wdc 8' 10 min  25' 24' 5 KK      Pa stretch and OBERs stretch    10 min   5' 5       R H/S stretch              Ktape scar              IASTM 6' jk             R patellar mobs    3 min   3' 2 KK      Neuro Re-Ed                                                                                                        Ther Ex     Bike for ROM 6' ROM 6' ROM 6' ROM 6 min  8' 8' 8' full ROM 8' full ROM      Prone hangs R              Quad sets R 5\" 20 5\" 20 5\" 20 5x20\"    5x20\"  5x20\"       Supine strap H/S stretch R  20\" 3 20\" 3 seated 20\"3   20\"x3 20\"x3      Heel slides with strap R 10\" 10 2#             Supine 90/90 with towel              R LAQ 2# 30  2# 20 2# 20x   2# 20x 4# 20x      R SAQ    2# 20 2# 20x   2#20x 4# 20x5\"      Seated R knee flex              Ankle pumps R              Step stretch              Std SLR x3 dir 2# 20 ea  2# 20 ea    2# 20x   2# 20x       SLR flex supine R 2# 20  2# 20    2# 20x          S/L SLR   2# 20              Prone SLR   2# 20              Ther Activity     Leg Press B   125# 20 165# lev 13   165#  Lvl 10 185# 3x10      Leg Press SL   65# 20 65# 20x Lev " 12   75#  Lvl 10                       75# 3x10      Sit to stand   20 20x          Gait Training                                 Modalities     CP R knee              Lymph compression  45mmhg  15'

## 2024-06-24 ENCOUNTER — OFFICE VISIT (OUTPATIENT)
Dept: PHYSICAL THERAPY | Facility: CLINIC | Age: 54
End: 2024-06-24
Payer: COMMERCIAL

## 2024-06-24 DIAGNOSIS — Z96.651 HISTORY OF TOTAL RIGHT KNEE REPLACEMENT: ICD-10-CM

## 2024-06-24 DIAGNOSIS — M17.11 LOCALIZED OSTEOARTHRITIS OF RIGHT KNEE: Primary | ICD-10-CM

## 2024-06-24 PROCEDURE — 97110 THERAPEUTIC EXERCISES: CPT | Performed by: PHYSICAL THERAPIST

## 2024-06-24 PROCEDURE — 97140 MANUAL THERAPY 1/> REGIONS: CPT | Performed by: PHYSICAL THERAPIST

## 2024-06-24 PROCEDURE — 97530 THERAPEUTIC ACTIVITIES: CPT | Performed by: PHYSICAL THERAPIST

## 2024-06-24 NOTE — PROGRESS NOTES
"Daily Note     Today's date: 2024  Patient name: Clint Fierro III  : 1970  MRN: [id number]  Referring provider: Marjorie Miles PA-C  Dx:   Encounter Diagnosis     ICD-10-CM    1. Localized osteoarthritis of right knee  M17.11       2. History of total right knee replacement  Z96.651                          Subjective:  Pt reports feeling much better.  Still a little weak and ROM is still a little limited but both much better.         Objective:  See treatment diary below      Assessment:  Pt is showing good increase in R knee flex ROM, but needs to also focus on ext ROM more as he has lost a few degrees of ext ROM during the past week.  He was advised to perform more prone hangs for HEP.  No issues with increased reps today.  Pt feels he can functionally perform all tasks now aside from higher level tasks such as running.       Plan:  Plan D/C on 6/3/24.  To MD on 24.  Continue to focus on R knee flex ROM.        POC EXPIRES On:  24  PRECAUTIONS:  None  CO-MORBIDITES:  None  PERSONAL FACTORS:  R TKA on 24  Access Code:  [email or web address]      Manuals 4/29 5/6 5/13 5/20 6/3 6/6 6/17 6/19 6/24     PROM R knee flex/ext JK/ renny 8' 15' Wdc 8' 10 min  25' 24' 5 KK 5'     Pa stretch and OBERs stretch    10 min   5' 5  5'     R H/S stretch              Ktape scar              IASTM 6' jk             R patellar mobs    3 min   3' 2 KK 2'     Neuro Re-Ed                                                                                                        Ther Ex     Bike for ROM 6' ROM 6' ROM 6' ROM 6 min  8' 8' 8' full ROM 8' full ROM 8'     Prone hangs R              Quad sets R 5\" 20 5\" 20 5\" 20 5x20\"    5x20\"  5x20\"  5\" 20     Supine strap H/S stretch R  20\" 3 20\" 3 seated 20\"3   20\"x3 20\"x3 20\" 3     Heel slides with strap R 10\" 10 2#             Supine 90/90 with towel              R LAQ 2# 30  2# 20 2# 20x   2# 20x 4# 20x 4# 3x20     R SAQ    2# 20 2# 20x   2#20x 4# 20x5\" 4# 3x20   "   Seated R knee flex              Ankle pumps R              Step stretch              Std SLR x3 dir 2# 20 ea  2# 20 ea    2# 20x   2# 20x       SLR flex supine R 2# 20  2# 20    2# 20x          S/L SLR   2# 20              Prone SLR   2# 20              Ther Activity     Leg Press B   125# 20 165# lev 13   165#  Lvl 10 185# 3x10 185# 2x20     Leg Press SL   65# 20 65# 20x Lev 12   75#  Lvl 10                       75# 3x10 75# 2x20     Sit to stand   20 20x          Gait Training                                 Modalities     CP R knee              Lymph compression  45mmhg  15'

## 2024-06-26 ENCOUNTER — APPOINTMENT (OUTPATIENT)
Dept: PHYSICAL THERAPY | Facility: CLINIC | Age: 54
End: 2024-06-26
Payer: COMMERCIAL

## 2024-07-01 ENCOUNTER — OFFICE VISIT (OUTPATIENT)
Dept: PHYSICAL THERAPY | Facility: CLINIC | Age: 54
End: 2024-07-01
Payer: COMMERCIAL

## 2024-07-01 DIAGNOSIS — M17.11 LOCALIZED OSTEOARTHRITIS OF RIGHT KNEE: Primary | ICD-10-CM

## 2024-07-01 DIAGNOSIS — Z96.651 HISTORY OF TOTAL RIGHT KNEE REPLACEMENT: ICD-10-CM

## 2024-07-01 PROCEDURE — 97110 THERAPEUTIC EXERCISES: CPT

## 2024-07-01 PROCEDURE — 97140 MANUAL THERAPY 1/> REGIONS: CPT

## 2024-07-01 NOTE — PROGRESS NOTES
"Daily Note     Today's date: 2024  Patient name: Clint Fierro III  : 1970  MRN: [id number]  Referring provider: Marjorie Miles PA-C  Dx:   Encounter Diagnosis     ICD-10-CM    1. Localized osteoarthritis of right knee  M17.11       2. History of total right knee replacement  Z96.651                          Subjective:  Pt reports he continues to feel better and achieving more ROM.  Walking down inclines at work now with no issues.       Objective:  See treatment diary below      Assessment:  Pt continues to improve overall ROM. Increased range post manual therapy. Able to increase resistance with TE's. Would continue to benefit form skilled PT.     Plan:  Plan D/C on 6/3/24.  To MD on 24.  Continue to focus on R knee flex ROM.        POC EXPIRES On:  24  PRECAUTIONS:  None  CO-MORBIDITES:  None  PERSONAL FACTORS:  R TKA on 24  Access Code:  [email or web address]      Manuals 4/29 5/6 5/13 5/20 6/3 6/6 6/17 6/19 6/24 7/1    PROM R knee flex/ext JK/ renny 8' 15' Wdc 8' 10 min  25' 24' 5 KK 5' 10'    Pa stretch and OBERs stretch    10 min   5' 5  5' 5'    R H/S stretch              Ktape scar              IASTM 6' jk             R patellar mobs    3 min   3' 2 KK 2' 2'    Neuro Re-Ed                                                                                                        Ther Ex     Bike for ROM 6' ROM 6' ROM 6' ROM 6 min  8' 8' 8' full ROM 8' full ROM 8' 8'    Prone hangs R              Quad sets R 5\" 20 5\" 20 5\" 20 5x20\"    5x20\"  5x20\"  5\" 20 5\"x20    Supine strap H/S stretch R  20\" 3 20\" 3 seated 20\"3   20\"x3 20\"x3 20\" 3 20\"x3    Heel slides with strap R 10\" 10 2#             Supine 90/90 with towel              R LAQ 2# 30  2# 20 2# 20x   2# 20x 4# 20x 4# 3x20 4# 3x20    R SAQ    2# 20 2# 20x   2#20x 4# 20x5\" 4# 3x20 4# 3x20    Seated R knee flex              Ankle pumps R              Step stretch              Std SLR x3 dir 2# 20 ea  2# 20 ea    2# 20x   2# 20x       SLR flex " supine R 2# 20  2# 20    2# 20x          S/L SLR   2# 20              Prone SLR   2# 20              Ther Activity     Leg Press B   125# 20 165# lev 13   165#  Lvl 10 185# 3x10 185# 2x20 185# 2x20    Leg Press SL   65# 20 65# 20x Lev 12   75#  Lvl 10                       75# 3x10 75# 2x20 85#  20x2    Sit to stand   20 20x          Gait Training                                 Modalities     CP R knee              Lymph compression  45mmhg  15'

## 2024-07-03 ENCOUNTER — OFFICE VISIT (OUTPATIENT)
Dept: PHYSICAL THERAPY | Facility: CLINIC | Age: 54
End: 2024-07-03
Payer: COMMERCIAL

## 2024-07-03 DIAGNOSIS — Z96.651 HISTORY OF TOTAL RIGHT KNEE REPLACEMENT: ICD-10-CM

## 2024-07-03 DIAGNOSIS — M17.11 LOCALIZED OSTEOARTHRITIS OF RIGHT KNEE: Primary | ICD-10-CM

## 2024-07-03 PROCEDURE — 97140 MANUAL THERAPY 1/> REGIONS: CPT | Performed by: PHYSICAL THERAPIST

## 2024-07-03 PROCEDURE — 97110 THERAPEUTIC EXERCISES: CPT | Performed by: PHYSICAL THERAPIST

## 2024-07-03 NOTE — PROGRESS NOTES
"Daily Note     Today's date: 7/3/2024  Patient name: Clint Fierro III  : 1970  MRN: [id number]  Referring provider: Marjorie Miles PA-C  Dx:   Encounter Diagnosis     ICD-10-CM    1. Localized osteoarthritis of right knee  M17.11       2. History of total right knee replacement  Z96.651                            Subjective:  Pt reports he continues to feel better and ready for D/C.  No more pain and doing everything he wants.        Objective:  See treatment diary below      Assessment:  Pt has met all of his goals and feels ready for D/C at this time.  He has full R knee ext ROM and close to full flex ROM with excellent strength and no pain.  He will have to continue with his HEP to further increase flex ROM.         Plan:  D/C PT.          POC EXPIRES On:  24  PRECAUTIONS:  None  CO-MORBIDITES:  None  PERSONAL FACTORS:  R TKA on 24  Access Code:  [email or web address]      Manuals 4/29 5/6 5/13 5/20 6/3 6/6 6/17 6/19 6/24 7/1 7/3   PROM R knee flex/ext JK/ renny 8' 15' Wdc 8' 10 min  25' 24' 5 KK 5' 10' 10'   Pa stretch and OBERs stretch    10 min   5' 5  5' 5' 5'   R H/S stretch              Ktape scar              IASTM 6' jk             R patellar mobs    3 min   3' 2 KK 2' 2' 2'   Neuro Re-Ed                                                                                                        Ther Ex     Bike for ROM 6' ROM 6' ROM 6' ROM 6 min  8' 8' 8' full ROM 8' full ROM 8' 8' 8'   Prone hangs R              Quad sets R 5\" 20 5\" 20 5\" 20 5x20\"    5x20\"  5x20\"  5\" 20 5\"x20 5\" 20   Supine strap H/S stretch R  20\" 3 20\" 3 seated 20\"3   20\"x3 20\"x3 20\" 3 20\"x3 20\" 3   Heel slides with strap R 10\" 10 2#             Supine 90/90 with towel              R LAQ 2# 30  2# 20 2# 20x   2# 20x 4# 20x 4# 3x20 4# 3x20 4# 3x20   R SAQ    2# 20 2# 20x   2#20x 4# 20x5\" 4# 3x20 4# 3x20 4# 3x20   Seated R knee flex              Ankle pumps R              Step stretch              Std SLR x3 dir 2# 20 ea  2# 20 ea "    2# 20x   2# 20x       SLR flex supine R 2# 20  2# 20    2# 20x          S/L SLR   2# 20              Prone SLR   2# 20              Ther Activity     Leg Press B   125# 20 165# lev 13   165#  Lvl 10 185# 3x10 185# 2x20 185# 2x20 185# 2x20   Leg Press SL   65# 20 65# 20x Lev 12   75#  Lvl 10                       75# 3x10 75# 2x20 85#  20x2 85# 2x20   Sit to stand   20 20x          Gait Training                                 Modalities     CP R knee              Lymph compression  45mmhg  15'

## 2024-07-12 ENCOUNTER — APPOINTMENT (OUTPATIENT)
Dept: LAB | Facility: HOSPITAL | Age: 54
End: 2024-07-12

## 2024-07-12 ENCOUNTER — OFFICE VISIT (OUTPATIENT)
Dept: OBGYN CLINIC | Facility: CLINIC | Age: 54
End: 2024-07-12

## 2024-07-12 VITALS — HEIGHT: 72 IN | HEART RATE: 68 BPM | BODY MASS INDEX: 34.54 KG/M2 | WEIGHT: 255 LBS

## 2024-07-12 DIAGNOSIS — M17.12 PRIMARY OSTEOARTHRITIS OF LEFT KNEE: ICD-10-CM

## 2024-07-12 DIAGNOSIS — Z96.651 AFTERCARE FOLLOWING RIGHT KNEE JOINT REPLACEMENT SURGERY: Primary | ICD-10-CM

## 2024-07-12 DIAGNOSIS — Z47.1 AFTERCARE FOLLOWING RIGHT KNEE JOINT REPLACEMENT SURGERY: Primary | ICD-10-CM

## 2024-07-12 PROCEDURE — 99024 POSTOP FOLLOW-UP VISIT: CPT | Performed by: STUDENT IN AN ORGANIZED HEALTH CARE EDUCATION/TRAINING PROGRAM

## 2024-07-12 NOTE — PROGRESS NOTES
Subjective: 54 y.o. female presents to the office 6 weeks s/p JOHN of right total knee arthroplasty performed on 05/30/2024. Initial right total knee performed on 02/29/2024. He has been progressing his motion. He notes that he has been able to navigate stairs and inclines/declines without issue. He is very happy with his results and knee since his manip.     Physical Exam:  Incision: well healed  ROM: 3-100 without pain  5/5 IP/Q/HS/TA/GS, 2+ DP/PT, SILT DP/SP/S/S/TN    Left Knee:      Inspection:  skin intact    Overall limb alignment varus    Effusion: mild    ROM 3-115 without pain    Extensor Lag: none    Palpation: medial Joint line tenderness to palpation    AP Stability at 90 deg stable    M/L stability in full extension stable    M/L stability in midflexion stable    Motor: 5/5 IP/Q/HS/TA/GS    Pulses: 2+ DP / 2+ PT    SILT DP/SP/S/S/TN    No new imaging obtained today    Assessment/Plan:  6 weeks s/p JOHN of right total knee arthroplasty performed on 05/30/2024  Right total knee arthroplasty performed on 02/29/2024  Left knee osteoarthritis    - continue multi-modal pain control   - Weight bearing status: as tolerated  - DVT ppx: completed  - Continue PT/OT exercises  - Discuss left total knee if pain worsens  - F/U in 6 weeks    Scribe Attestation      I,:  Bianka Foley am acting as a scribe while in the presence of the attending physician.:       I,:  Christiano Delgado DO personally performed the services described in this documentation    as scribed in my presence.:

## 2024-08-30 ENCOUNTER — TELEPHONE (OUTPATIENT)
Age: 54
End: 2024-08-30

## 2024-08-30 NOTE — TELEPHONE ENCOUNTER
Caller: Rosaura - Patient Spouse    Doctor: Danny    Reason for call: Calling to confirm appointment was cancelled.  Received a reminder after cancelling and wanted to be sure.  Confirmed cancellation.  No further action at this time.    Call back#: N/A

## 2024-11-01 NOTE — ANESTHESIA POSTPROCEDURE EVALUATION
Called 1x. Patient requested a call back in December.   Post-Op Assessment Note    CV Status:  Stable  Pain Score: 4    Pain management: adequate    Multimodal analgesia used between 6 hours prior to anesthesia start to PACU discharge    Mental Status:  Alert   Hydration Status:  Euvolemic   PONV Controlled:  None   Airway Patency:  Patent     Post Op Vitals Reviewed: Yes    No anethesia notable event occurred.    Staff: CRNA               BP   128/94   Temp   97.4   Pulse  73   Resp   10   SpO2   97

## 2024-12-30 ENCOUNTER — OFFICE VISIT (OUTPATIENT)
Dept: URGENT CARE | Facility: CLINIC | Age: 54
End: 2024-12-30
Payer: COMMERCIAL

## 2024-12-30 VITALS
OXYGEN SATURATION: 96 % | DIASTOLIC BLOOD PRESSURE: 88 MMHG | RESPIRATION RATE: 18 BRPM | TEMPERATURE: 96.7 F | SYSTOLIC BLOOD PRESSURE: 112 MMHG | HEART RATE: 90 BPM

## 2024-12-30 DIAGNOSIS — J98.8 RESPIRATORY TRACT INFECTION: Primary | ICD-10-CM

## 2024-12-30 PROCEDURE — 99213 OFFICE O/P EST LOW 20 MIN: CPT

## 2024-12-30 RX ORDER — METHYLPREDNISOLONE 4 MG/1
TABLET ORAL
Qty: 21 TABLET | Refills: 0 | Status: SHIPPED | OUTPATIENT
Start: 2024-12-30

## 2024-12-30 RX ORDER — BENZONATATE 200 MG/1
200 CAPSULE ORAL 3 TIMES DAILY PRN
Qty: 20 CAPSULE | Refills: 0 | Status: SHIPPED | OUTPATIENT
Start: 2024-12-30

## 2024-12-30 NOTE — PROGRESS NOTES
[facility]        NAME: Clint Fierro III is a 54 y.o. male  : 1970    MRN: [id number]  DATE: 2024  TIME: 12:45 PM    Assessment and Plan   Respiratory tract infection [J98.8]  1. Respiratory tract infection  amoxicillin-clavulanate (AUGMENTIN) 875-125 mg per tablet    dextromethorphan-guaifenesin (MUCINEX DM)  MG per 12 hr tablet    benzonatate (TESSALON) 200 MG capsule    methylPREDNISolone 4 MG tablet therapy pack            Patient Instructions     Take Augmentin and steroids as prescribed.    For nasal/sinus congestion you can try steam, warm compresses, saline nasal spray, Neti pot, nasal steroid (Flonase, Nasocort), or nasal decongestant (Afrin - for 3 days only).    You can try a decongestant (Sudafed) if > 6 years of age and no history of high blood pressure.    For cough you can take an over-the-counter expectorant such as plain Robitussion or Mucinex. A spoonful of honey at bedtime may also be helpful.    For cold symptoms with high blood pressure take Coricidin cough/cold.     For sore throat you can use Cepacol lozenges, do warm salt water gargles, drink warm water with lemon or herbal teas, or use an over-the-counter throat spray (Chloraseptic).    You can take ibuprofen/Motrin and acetaminophen/Tylenol as needed for pain, fever, body aches. Do not take ibuprofen/Motrin/Advil if you have a history of heart disease, bleeding ulcers, or if you take blood thinners.     Drink plenty of fluids to stay hydrated. Airborne or Emergen-C for extra vitamin C and zinc.    Follow up with your PCP in 3-5 days for persistent symptoms.    Go to the ER if symptoms worsen.     If tests are performed, our office will contact you with results only if changes need to made to the care plan discussed with you at the visit. You can review your full results on [facility].      Chief Complaint     Chief Complaint   Patient presents with    chest congestion     Patient states for  the last 3 days he's experienced chest congestion, cough, bilateral ear pain, sore throat, and hot and cold flashes. Patient also is complaining of headaches. Patient has takes Tylenol for symptoms.         History of Present Illness       54-year-old male who presents for evaluation of cold-like symptoms x 3-4 days.  Patient reports headache, nasal congestion, ear pain, sore throat, and a cough.  Cough is loose and occasionally productive of mucous.  No known fevers or GI symptoms.   Does have hot flashes and chills.  Taking Tylenol.   Known sick contacts, children at home and at family gathering.        Review of Systems   Review of Systems   Constitutional:  Positive for chills, diaphoresis and fatigue. Negative for fever.   HENT:  Positive for congestion, ear pain, rhinorrhea and sore throat.    Eyes:  Negative for discharge and redness.   Respiratory:  Positive for cough. Negative for chest tightness, shortness of breath and wheezing.    Cardiovascular:  Negative for chest pain and palpitations.   Gastrointestinal:  Negative for abdominal pain, diarrhea, nausea and vomiting.   Skin:  Negative for rash.   Neurological:  Positive for headaches. Negative for dizziness and light-headedness.         Current Medications       Current Outpatient Medications:     amoxicillin-clavulanate (AUGMENTIN) 875-125 mg per tablet, Take 1 tablet by mouth every 12 (twelve) hours for 7 days, Disp: 14 tablet, Rfl: 0    benzonatate (TESSALON) 200 MG capsule, Take 1 capsule (200 mg total) by mouth 3 (three) times a day as needed for cough, Disp: 20 capsule, Rfl: 0    dextromethorphan-guaifenesin (MUCINEX DM)  MG per 12 hr tablet, Take 1 tablet by mouth every 12 (twelve) hours for 7 days, Disp: 14 tablet, Rfl: 0    acetaminophen (TYLENOL) 500 mg tablet, Take 2 tablets (1,000 mg total) by mouth every 8 (eight) hours, Disp: 180 tablet, Rfl: 3    acetaminophen (TYLENOL) 500 mg tablet, Take 2 tablets (1,000 mg total) by mouth every 8  (eight) hours, Disp: 60 tablet, Rfl: 0    acetaminophen (TYLENOL) 650 mg CR tablet, Take 650 mg by mouth every 8 (eight) hours as needed for mild pain Taking 500mg, Disp: , Rfl:     azelastine (ASTELIN) 0.1 % nasal spray, 1 spray into each nostril 2 (two) times a day Use in each nostril as directed (Patient taking differently: 1 spray into each nostril 2 (two) times a day as needed Use in each nostril as directed), Disp: 1 mL, Rfl: 0    celecoxib (CeleBREX) 200 mg capsule, Take 1 capsule (200 mg total) by mouth 2 (two) times a day, Disp: 60 capsule, Rfl: 0    cetirizine (ZyrTEC) 10 mg tablet, Take 10 mg by mouth daily as needed for allergies, Disp: , Rfl:     fluticasone (FLONASE) 50 mcg/act nasal spray, Use 2 sprays in each nostril at bedtime as needed, Disp: , Rfl:     methylPREDNISolone 4 MG tablet therapy pack, Use as directed on package, Disp: 21 tablet, Rfl: 0    Multiple Vitamins-Minerals (multivitamin with minerals) tablet, Take 1 tablet by mouth daily, Disp: 30 tablet, Rfl: 1    ondansetron (ZOFRAN-ODT) 4 mg disintegrating tablet, Take 1 tablet (4 mg total) by mouth every 6 (six) hours as needed for nausea or vomiting (Patient not taking: Reported on 6/14/2024), Disp: 20 tablet, Rfl: 0    Current Allergies     Allergies as of 12/30/2024 - Reviewed 12/30/2024   Allergen Reaction Noted    Chantix [varenicline] Swelling 10/14/2017            The following portions of the patient's history were reviewed and updated as appropriate: allergies, current medications, past family history, past medical history, past social history, past surgical history and problem list.     Past Medical History:   Diagnosis Date    Hearing loss, left     Hyperlipidemia     Osteoarthritis     right knee    Pain in right knee     Pneumonia     Seasonal allergies        Past Surgical History:   Procedure Laterality Date    COLONOSCOPY      FRACTURE SURGERY      right humerus    KNEE ARTHROSCOPY Bilateral     debridement    OR ARTHRP YARYE  CONDYLE&PLATU MEDIAL&LAT COMPARTMENTS Right 2/29/2024    Procedure: ARTHROPLASTY KNEE TOTAL;  Surgeon: Christiano Delgado DO;  Location: [facility];  Service: Orthopedics    MD MANIPULATION KNEE JOINT UNDER GENERAL ANESTHESIA Right 5/30/2024    Procedure: MANIPULATION JOINT KNEE;  Surgeon: Christiano Delgado DO;  Location: [facility];  Service: Orthopedics    ROTATOR CUFF REPAIR Right     labrum, rotator cuff and bicep tendon repairs    SHOULDER SURGERY Right 2014       Family History   Problem Relation Age of Onset    Alcohol abuse Mother         denies    Substance Abuse Mother         denies    Mental illness Mother         denies    Colon polyps Neg Hx     Colon cancer Neg Hx          Medications have been verified.        Objective   /88   Pulse 90   Temp (!) 96.7 °F (35.9 °C)   Resp 18   SpO2 96%        Physical Exam     Physical Exam  Vitals and nursing note reviewed.   Constitutional:       General: He is not in acute distress.     Appearance: He is not ill-appearing or diaphoretic.   HENT:      Head: Normocephalic and atraumatic.      Right Ear: Tympanic membrane, ear canal and external ear normal.      Left Ear: Tympanic membrane, ear canal and external ear normal.      Nose: Congestion present.      Mouth/Throat:      Mouth: Mucous membranes are moist.      Pharynx: Oropharynx is clear. Posterior oropharyngeal erythema present.   Eyes:      Conjunctiva/sclera: Conjunctivae normal.      Pupils: Pupils are equal, round, and reactive to light.   Cardiovascular:      Rate and Rhythm: Normal rate and regular rhythm.      Pulses: Normal pulses.      Heart sounds: Normal heart sounds.   Pulmonary:      Effort: Pulmonary effort is normal.      Breath sounds: Normal breath sounds.   Musculoskeletal:         General: Normal range of motion.      Cervical back: Normal range of motion and neck supple.   Skin:     General: Skin is warm and dry.      Capillary Refill: Capillary refill takes less than 2  seconds.   Neurological:      Mental Status: He is alert and oriented to person, place, and time.

## 2024-12-30 NOTE — PATIENT INSTRUCTIONS
Take Augmentin and steroids as prescribed.    For nasal/sinus congestion you can try steam, warm compresses, saline nasal spray, Neti pot, nasal steroid (Flonase, Nasocort), or nasal decongestant (Afrin - for 3 days only).    You can try a decongestant (Sudafed) if > 6 years of age and no history of high blood pressure.    For cough you can take an over-the-counter expectorant such as plain Robitussion or Mucinex. A spoonful of honey at bedtime may also be helpful.    For cold symptoms with high blood pressure take Coricidin cough/cold.     For sore throat you can use Cepacol lozenges, do warm salt water gargles, drink warm water with lemon or herbal teas, or use an over-the-counter throat spray (Chloraseptic).    You can take ibuprofen/Motrin and acetaminophen/Tylenol as needed for pain, fever, body aches. Do not take ibuprofen/Motrin/Advil if you have a history of heart disease, bleeding ulcers, or if you take blood thinners.     Drink plenty of fluids to stay hydrated. Airborne or Emergen-C for extra vitamin C and zinc.    Follow up with your PCP in 3-5 days for persistent symptoms.    Go to the ER if symptoms worsen.

## 2025-01-31 ENCOUNTER — OFFICE VISIT (OUTPATIENT)
Dept: FAMILY MEDICINE CLINIC | Facility: HOSPITAL | Age: 55
End: 2025-01-31
Payer: COMMERCIAL

## 2025-01-31 VITALS
WEIGHT: 279.8 LBS | TEMPERATURE: 97.7 F | HEART RATE: 89 BPM | BODY MASS INDEX: 37.9 KG/M2 | DIASTOLIC BLOOD PRESSURE: 86 MMHG | SYSTOLIC BLOOD PRESSURE: 128 MMHG | HEIGHT: 72 IN

## 2025-01-31 DIAGNOSIS — R05.2 SUBACUTE COUGH: Primary | ICD-10-CM

## 2025-01-31 DIAGNOSIS — L98.9 SKIN LESION OF BACK: ICD-10-CM

## 2025-01-31 PROCEDURE — 99213 OFFICE O/P EST LOW 20 MIN: CPT | Performed by: NURSE PRACTITIONER

## 2025-01-31 RX ORDER — ALBUTEROL SULFATE 90 UG/1
2 INHALANT RESPIRATORY (INHALATION) EVERY 6 HOURS PRN
Qty: 18 G | Refills: 5 | Status: SHIPPED | OUTPATIENT
Start: 2025-01-31

## 2025-03-07 ENCOUNTER — TELEPHONE (OUTPATIENT)
Dept: OBGYN CLINIC | Facility: HOSPITAL | Age: 55
End: 2025-03-07

## 2025-03-07 NOTE — TELEPHONE ENCOUNTER
Caller: Clint    Doctor: Dr. Delgado    Reason for call: Patient wanted to see you for his left knee, having pain with walking etc. He stated he had put off coming to see you now its bad. I could not get him in till 3/21/25. What can he take to help with the pain?    OTC Meds: Tylenol 500 mg  twice this week only.      Call back#: [phone]

## 2025-03-07 NOTE — TELEPHONE ENCOUNTER
Called and left message for pt, will await call back to relay message from JONO Miles. Please schedule him with [facility] when he calls back, thanks!

## 2025-03-07 NOTE — TELEPHONE ENCOUNTER
Called and spoke with pt. Last Wednesday 2/26/25 he was at a conference and had a few drinks, he woke up the next morning with left knee pain. He does not remember a specific injury. He could barely put pressure on it. The pain in behind the knee and to the left of the knee. No redness or warmth, some swelling above and below knee cap but mild. The pain shoots from the knee up to the hip and down to the ankle. When he is sitting there is no pain, when he first stands up the pain is excruciating and once he walks the is a little improved. Advised he can take tylenol and/or ibuprofen, rest, ice/heat, compression/knee brace and offload with a cane or walker. He is scheduled for 3/21/25, can he be seen sooner or can he be scheduled with an [facility] provider? Please advise, thanks!

## 2025-03-07 NOTE — TELEPHONE ENCOUNTER
He can definitely be seen at [facility], and then we can still see him on 3/21 if he needs a follow up with us after that appointment.  Thanks!

## 2025-03-10 NOTE — ASSESSMENT & PLAN NOTE
The diagnosis as well as treatment options for the knee osteoarthritis were reviewed with the patient in the office today.  X-ray images were reviewed with the patient.  He is aware of the advanced degenerative changes and has been advised by Dr. Delgado he needs a total knee replacement.  He does not wish to schedule surgery at this time.  He reports the medial  brace he has had is not very helpful.  I reviewed with the patient his subjective complaints and physical exam findings today in the office are not consistent with an arthritic flare.  I believe he has developed a nerve irritation either from the lumbar spine or the lateral knee irritating the peroneal nerve.    I would not recommend an intra-articular cortisone injection at this time but it could be considered in the near future if he does not respond to the oral steroids for nerve irritation.    Orders:    XR knee 4+ vw left injury; Future

## 2025-03-10 NOTE — PROGRESS NOTES
Orthopaedic Surgery - Office Note  Clint Fierro III (55 y.o. male)   : 1970   MRN: [id number]  Encounter Date: 3/11/2025    Chief Complaint   Patient presents with    Left Knee - Pain         Assessment & Plan  Primary osteoarthritis of left knee  The diagnosis as well as treatment options for the knee osteoarthritis were reviewed with the patient in the office today.  X-ray images were reviewed with the patient.  He is aware of the advanced degenerative changes and has been advised by Dr. Delgado he needs a total knee replacement.  He does not wish to schedule surgery at this time.  He reports the medial  brace he has had is not very helpful.  I reviewed with the patient his subjective complaints and physical exam findings today in the office are not consistent with an arthritic flare.  I believe he has developed a nerve irritation either from the lumbar spine or the lateral knee irritating the peroneal nerve.    I would not recommend an intra-articular cortisone injection at this time but it could be considered in the near future if he does not respond to the oral steroids for nerve irritation.    Orders:    XR knee 4+ vw left injury; Future    Acute pain of left knee    Orders:    XR knee 4+ vw left injury; Future    Acquired varus deformity knee, left         Injury of left peroneal nerve, initial encounter  The working diagnosis of a neurological irritation after acute injury at the conference was reviewed.  The risks and benefits of a short course of oral steroids were reviewed.  Shared decision making was utilized and he wished to try the medication.  Appropriate safety precautions and patient education were provided.  Patient will watch for signs of a foot drop and seek immediate medical care if they develop.  All question and concerns were answered in the office today.  Orders:    methylPREDNISolone 4 MG tablet therapy pack; Use as directed on package       Return if symptoms worsen or  fail to improve.  Patient has follow-up scheduled on 3/21/2025 with Dr. Delgado.        History of Present Illness  This is a previous patient with known severe left knee osteoarthritis.  He was at a work conference recently whenever he became intoxicated and may have had an injury to the left lower extremity but is unsure.  He awoke to significant left leg pain described as shooting in the outside of the knee down the lateral aspect of the calf and to some degree up the lateral hip.  Rising to a standing position increases the pain.  There has been associated swelling.  He is had a recent right total knee arthroplasty and manipulation with Dr. Delgado.  He has been using over-the-counter Tylenol and NSAIDs for the current left knee pain but has had no other treatment.  Patient reports since taking 3 g of Tylenol daily he has had decrease in the symptoms since the weekend whenever he called in.  He isolates all the pain to the lateral aspect of the knee and leg.  He denies any medial knee pain or swelling in the knee.  The pain is described as sharp and electrical.    Review of Systems  Pertinent items are noted in HPI.  All other systems were reviewed and are negative.    Physical Exam  Ht 6' (1.829 m)   Wt 127 kg (279 lb)   BMI 37.84 kg/m²   Cons: Appears well.  No apparent distress.  Psych: Alert. Oriented x3.  Mood and affect normal.    Patient's left knee is without skin breakdown lesion or signs of infection.  Varus deformity is noted.  There is no prepatellar bursitis.  He has extension lacking a few degrees and flexion to 125 degrees.  He is nontender on the medial joint line.  He is nontender on the lateral joint line.  He has moderate discomfort in the region of the fibular head.  Palpation throughout the lateral knee reportedly causes shooting pain into the left lower leg.  His EHL and dorsiflexion strength is 5 out of 5.  He has a negative straight leg raise in the office today.  His quad tendon is  intact.  There is no tenderness on the patella tendon.  He has no pain with varus stressing but has mild discomfort to valgus stressing and some instability and medial knee pain.  His gait is heel-to-toe in the office today.    Independent review of x-rays performed in the office today of the left knee show advanced degenerative changes with medial joint space collapse and bone-on-bone deformity and significant varus deformity.  He is moderate to severe patellofemoral degenerative changes as well.  X-rays were reviewed from orthopedic standpoint will await official radiologist interpretation      Studies Reviewed       LEFT KNEE     INDICATION:   Encounter for other specified special examinations.      COMPARISON:  Comparison made with previous examination(s) dated (DX) 09-Nov-2022,(DX) 25-Aug-2021.     VIEWS:  XR KNEE 1 OR 2 VW LEFT      FINDINGS:     There is no acute fracture or dislocation.     Joint effusion cannot be reliably evaluated without lateral view.     There is severe medial compartment narrowing. Resulting varus angulation of the knee. Lateral compartment is widened. Mild lateral subluxation secondarily. Small osteophytes of the lateral tibial plateau.        No lytic or blastic osseous lesion.     Soft tissues are unremarkable.     IMPRESSION:           Severe medial compartment degenerative changes with mild varus angulation secondarily. No fracture. Narrowing may have increased slightly from prior study of 11/9/2022.     Electronically signed: 01/23/2024 09:49 AM Khalif Marcus MD  Previous x-rays and images were reviewed by myself in the office today and I agree with radiologist interpretation.  Patient phone messages were reviewed by myself in the office today.      Procedures  No procedures today.    Medical, Surgical, Family, and Social History  The patient's medical history, family history, and social history, were reviewed and updated as appropriate.    Past Medical History:   Diagnosis  Date    Hearing loss, left     Hyperlipidemia     Osteoarthritis     right knee    Pain in right knee     Pneumonia     Seasonal allergies        Past Surgical History:   Procedure Laterality Date    COLONOSCOPY      FRACTURE SURGERY      right humerus    KNEE ARTHROSCOPY Bilateral     debridement    MO ARTHRP KNE CONDYLE&PLATU MEDIAL&LAT COMPARTMENTS Right 2/29/2024    Procedure: ARTHROPLASTY KNEE TOTAL;  Surgeon: Christiano Delgado DO;  [facility]: [facility];  Service: Orthopedics    MO MANIPULATION KNEE JOINT UNDER GENERAL ANESTHESIA Right 5/30/2024    Procedure: MANIPULATION JOINT KNEE;  Surgeon: Christiano Delgado DO;  Location: [facility];  Service: Orthopedics    ROTATOR CUFF REPAIR Right     labrum, rotator cuff and bicep tendon repairs    SHOULDER SURGERY Right 2014       Family History   Problem Relation Age of Onset    Alcohol abuse Mother         denies    Substance Abuse Mother         denies    Mental illness Mother         denies    Colon polyps Neg Hx     Colon cancer Neg Hx        Social History     Occupational History    Not on file   Tobacco Use    Smoking status: Every Day     Current packs/day: 0.00     Types: Cigarettes    Smokeless tobacco: Current     Types: Snuff    Tobacco comments:     Quit smoking 35 yrs ago   Vaping Use    Vaping status: Never Used   Substance and Sexual Activity    Alcohol use: Yes     Alcohol/week: 10.0 standard drinks of alcohol     Types: 10 Cans of beer per week     Comment: few x week    Drug use: No    Sexual activity: Yes     Partners: Female     Birth control/protection: None       Allergies   Allergen Reactions    Chantix [Varenicline] Swelling     In legs         Current Outpatient Medications:     albuterol (Ventolin HFA) 90 mcg/act inhaler, Inhale 2 puffs every 6 (six) hours as needed for wheezing or shortness of breath (and cough), Disp: 18 g, Rfl: 5    cetirizine (ZyrTEC) 10 mg tablet, Take 10 mg by mouth daily as needed for allergies, Disp: , Rfl:      methylPREDNISolone 4 MG tablet therapy pack, Use as directed on package, Disp: 21 tablet, Rfl: 0    Multiple Vitamins-Minerals (multivitamin with minerals) tablet, Take 1 tablet by mouth daily, Disp: 30 tablet, Rfl: 1    fluticasone (FLONASE) 50 mcg/act nasal spray, Use 2 sprays in each nostril at bedtime as needed (Patient not taking: Reported on 1/31/2025), Disp: , Rfl:       Raymond Carrillo PA-C

## 2025-03-11 ENCOUNTER — APPOINTMENT (OUTPATIENT)
Dept: RADIOLOGY | Facility: CLINIC | Age: 55
End: 2025-03-11
Payer: COMMERCIAL

## 2025-03-11 ENCOUNTER — OFFICE VISIT (OUTPATIENT)
Dept: OBGYN CLINIC | Facility: CLINIC | Age: 55
End: 2025-03-11
Payer: COMMERCIAL

## 2025-03-11 VITALS — HEIGHT: 72 IN | WEIGHT: 279 LBS | BODY MASS INDEX: 37.79 KG/M2

## 2025-03-11 DIAGNOSIS — M25.562 ACUTE PAIN OF LEFT KNEE: ICD-10-CM

## 2025-03-11 DIAGNOSIS — M17.12 PRIMARY OSTEOARTHRITIS OF LEFT KNEE: Primary | ICD-10-CM

## 2025-03-11 DIAGNOSIS — M21.162 ACQUIRED VARUS DEFORMITY KNEE, LEFT: ICD-10-CM

## 2025-03-11 DIAGNOSIS — M17.12 PRIMARY OSTEOARTHRITIS OF LEFT KNEE: ICD-10-CM

## 2025-03-11 DIAGNOSIS — S84.12XA INJURY OF LEFT PERONEAL NERVE, INITIAL ENCOUNTER: ICD-10-CM

## 2025-03-11 PROCEDURE — 99213 OFFICE O/P EST LOW 20 MIN: CPT | Performed by: PHYSICIAN ASSISTANT

## 2025-03-11 PROCEDURE — 73564 X-RAY EXAM KNEE 4 OR MORE: CPT

## 2025-03-11 RX ORDER — METHYLPREDNISOLONE 4 MG/1
TABLET ORAL
Qty: 21 TABLET | Refills: 0 | Status: SHIPPED | OUTPATIENT
Start: 2025-03-11

## 2025-03-21 ENCOUNTER — OFFICE VISIT (OUTPATIENT)
Dept: OBGYN CLINIC | Facility: CLINIC | Age: 55
End: 2025-03-21
Payer: COMMERCIAL

## 2025-03-21 VITALS — BODY MASS INDEX: 37.79 KG/M2 | WEIGHT: 279 LBS | HEIGHT: 72 IN

## 2025-03-21 DIAGNOSIS — M17.12 PRIMARY OSTEOARTHRITIS OF LEFT KNEE: Primary | ICD-10-CM

## 2025-03-21 PROCEDURE — 99213 OFFICE O/P EST LOW 20 MIN: CPT | Performed by: STUDENT IN AN ORGANIZED HEALTH CARE EDUCATION/TRAINING PROGRAM

## 2025-03-21 NOTE — ASSESSMENT & PLAN NOTE
Findings today are consistent with left knee osteoarthritis. Imaging and prognosis was reviewed with the patient today. Discussed treatment options including continued observation, low impact exercises, bracing, anti-inflammatories, physical therapy, cortisone injection, visco injection, versus surgical intervention. Continue activities as tolerated. Discussed considering surgical intervention for sometime in November/December 2025. Follow up in July/August.

## 2025-03-21 NOTE — PROGRESS NOTES
Knee New Office Note    Assessment:     1. Primary osteoarthritis of left knee        Plan:  Assessment & Plan  Primary osteoarthritis of left knee  Findings today are consistent with left knee osteoarthritis. Imaging and prognosis was reviewed with the patient today. Discussed treatment options including continued observation, low impact exercises, bracing, anti-inflammatories, physical therapy, cortisone injection, visco injection, versus surgical intervention. Continue activities as tolerated. Discussed considering surgical intervention for sometime in November/December 2025. Follow up in July/August.         Subjective:     Patient ID: Clint Fierro III is a 55 y.o. male.  Chief Complaint:  HPI:  55 y.o. male presents to the office for follow up of left knee osteoarthritis. He notes that he had an injury while at a conference roughly 1 month ago where he was experiencing a sharp lateral knee pain which shot down the lateral aspect of his calf into his heal and also shooting up his lateral thigh. He notes that this continued for roughly 2.5 weeks. He now is experiencing anteromedial and posterolateral left knee pain. His pain worsens with activities. He has tried a medrol dose drea without relief. He has not gotten relief from previous cortisone injection. He is interested in discussing surgical intervention but wants to hold off until the end of his work season in November/December timeframe. He is also s/p JOHN of right total knee arthroplasty performed on 05/30/2024 and right total knee performed on 02/29/2024, doing well.     Allergy:  Allergies   Allergen Reactions    Chantix [Varenicline] Swelling     In legs     Medications:  all current active meds have been reviewed  Past Medical History:  Past Medical History:   Diagnosis Date    Hearing loss, left     Hyperlipidemia     Osteoarthritis     right knee    Pain in right knee     Pneumonia     Seasonal allergies      Past Surgical History:  Past Surgical  History:   Procedure Laterality Date    COLONOSCOPY      FRACTURE SURGERY      right humerus    KNEE ARTHROSCOPY Bilateral     debridement    VT ARTHRP KNE CONDYLE&PLATU MEDIAL&LAT COMPARTMENTS Right 2/29/2024    Procedure: ARTHROPLASTY KNEE TOTAL;  Surgeon: Christiano Delgado DO;  Location: [facility];  Service: Orthopedics    VT MANIPULATION KNEE JOINT UNDER GENERAL ANESTHESIA Right 5/30/2024    Procedure: MANIPULATION JOINT KNEE;  Surgeon: Christiano Delgado DO;  [facility];  Service: Orthopedics    ROTATOR CUFF REPAIR Right     labrum, rotator cuff and bicep tendon repairs    SHOULDER SURGERY Right 2014     Family History:  Family History   Problem Relation Age of Onset    Alcohol abuse Mother         denies    Substance Abuse Mother         denies    Mental illness Mother         denies    Colon polyps Neg Hx     Colon cancer Neg Hx      Social History:  Social History     Substance and Sexual Activity   Alcohol Use Yes    Alcohol/week: 10.0 standard drinks of alcohol    Types: 10 Cans of beer per week    Comment: few x week     Social History     Substance and Sexual Activity   Drug Use No     Social History     Tobacco Use   Smoking Status Every Day    Current packs/day: 0.00    Types: Cigarettes   Smokeless Tobacco Current    Types: Snuff   Tobacco Comments    Quit smoking 35 yrs ago         ROS:  General: Per HPI  Skin: Negative, except if noted below  HEENT: Negative  Respiratory: Negative  Cardiovascular: Negative  Gastrointestinal: Negative  Urinary: Negative  Vascular: Negative  Musculoskeletal: Positive per HPI   Neurologic: Positive per HPI  Endocrine: Negative    Objective:  BP Readings from Last 1 Encounters:   01/31/25 128/86      Wt Readings from Last 1 Encounters:   03/21/25 127 kg (279 lb)        Respiratory:   non-labored respirations    Lymphatics:  no palpable lymph nodes    Gait:   Antalgic    Neurologic:   Alert and oriented times 3  Patient with normal sensation except as  noted below  Deep tendon reflexes 2+ except as noted in MSK exam    Bilateral Lower Extremity:  Left Knee:      Inspection:  skin intact    Overall limb alignment varus    Effusion: mild    ROM 3-115 without pain    Extensor Lag: none    Palpation: medial Joint line tenderness to palpation    AP Stability at 90 deg stable    M/L stability in full extension stable    M/L stability in midflexion stable    Motor: 5/5 IP/Q/HS/TA/GS    Pulses: 2+ DP / 2+ PT    SILT DP/SP/S/S/TN    Right knee:     Inspection:  well healed incision    Overall limb alignment neutral    Effusion: none    ROM 3-100 without pain    Extensor Lag: none    Palpation: no Joint line tenderness to palpation    AP Stability at 90 deg stable    M/L stability in full extension stable    M/L stability in midflexion stable    Motor: 5/5 Q/HS/TA/GS/P    Pulses: 2+ DP / 2+ PT    SILT DP/SP/S/S/TN    Imaging:  My interpretation XR AP scanogram/AP bilateral knee/lateral/ibarra/sunrise left knee: severe medial compartment joint space narrowing, subchondral sclerosis, subchondral cysts, osteophyte formation. Bone on bone changes with varus alignment. No fracture or dislocation.     BMI:   Estimated body mass index is 37.84 kg/m² as calculated from the following:    Height as of this encounter: 6' (1.829 m).    Weight as of this encounter: 127 kg (279 lb).  BSA:   Estimated body surface area is 2.46 meters squared as calculated from the following:    Height as of this encounter: 6' (1.829 m).    Weight as of this encounter: 127 kg (279 lb).           Scribe Attestation      I,:  Bianka Foley am acting as a scribe while in the presence of the attending physician.:       I,:  Christiano Delgado DO personally performed the services described in this documentation    as scribed in my presence.:

## 2025-04-25 ENCOUNTER — VBI (OUTPATIENT)
Dept: ADMINISTRATIVE | Facility: OTHER | Age: 55
End: 2025-04-25

## 2025-04-25 NOTE — TELEPHONE ENCOUNTER
04/25/25 11:12 AM     Chart reviewed for [facility]: Colonoscopy was/were submitted to the patient's insurance.     Dayna Benito MA   [facility]

## 2025-06-17 ENCOUNTER — TELEPHONE (OUTPATIENT)
Age: 55
End: 2025-06-17

## 2025-06-17 NOTE — TELEPHONE ENCOUNTER
Caller: Self    Doctor: Dr. Delgado    Reason for call: Patient is having a lot of pain and discomfort, struggles to walk. He would like to set up surgery for the November/December. Wants to know if he can book it now and confirm everything when he comes in for f/u    Also, wants to know if there are any options that can be recommended to manage the pain, any medications? can he try a CSI? If so, can he seen sooner than 7/25 to get this done?    Call back#: [phone]

## 2025-06-17 NOTE — TELEPHONE ENCOUNTER
Reviewed last OVN 3/21/25 w/Dr Delgado-discussed CSI, Visco, and  surgery. Pt states he would like to have surgery and has an appt w/Dr Delgado on on 7/25/25 and will discuss surgery in Nov/Dec timeframe due to work. He states over last 3 weeks pain has gotten worse 8.5/10 and he needs something done. Taking tylenol 325mg 3 tabs in AM to start his day. Advised he can take up to 3000mg in 24hour max dose of tylenol. States NSAIDs are not longer working for him. The inside L knee hurts with every step. Outside of knee where tendons attach constntly have knife like pain. Knee hurts at night so he can't sleep. He is elevating, icing, using knee sleeve, lidocaine spray. Lost 20#. Can offload w/cane if needs to to help. Scheduled an appt w/JONO Miles on 6/23/25 for f/u for possible CSI to help w/pain since discussed at last OV. Please review and advise further if needed. Did inform pt that it takes 3 months after a steroid injection before surgery can be done-so he is aware. States he needs something done for the pain. Thank you.

## 2025-06-23 ENCOUNTER — OFFICE VISIT (OUTPATIENT)
Age: 55
End: 2025-06-23
Payer: COMMERCIAL

## 2025-06-23 VITALS — BODY MASS INDEX: 37.79 KG/M2 | WEIGHT: 279 LBS | HEIGHT: 72 IN

## 2025-06-23 DIAGNOSIS — M17.12 PRIMARY OSTEOARTHRITIS OF LEFT KNEE: Primary | ICD-10-CM

## 2025-06-23 PROCEDURE — 20610 DRAIN/INJ JOINT/BURSA W/O US: CPT | Performed by: PHYSICIAN ASSISTANT

## 2025-06-23 PROCEDURE — 99213 OFFICE O/P EST LOW 20 MIN: CPT | Performed by: PHYSICIAN ASSISTANT

## 2025-06-23 RX ORDER — LIDOCAINE HYDROCHLORIDE 10 MG/ML
4 INJECTION, SOLUTION INFILTRATION; PERINEURAL
Status: COMPLETED | OUTPATIENT
Start: 2025-06-23 | End: 2025-06-23

## 2025-06-23 RX ORDER — TRIAMCINOLONE ACETONIDE 40 MG/ML
40 INJECTION, SUSPENSION INTRA-ARTICULAR; INTRAMUSCULAR
Status: COMPLETED | OUTPATIENT
Start: 2025-06-23 | End: 2025-06-23

## 2025-06-23 RX ADMIN — LIDOCAINE HYDROCHLORIDE 4 ML: 10 INJECTION, SOLUTION INFILTRATION; PERINEURAL at 14:45

## 2025-06-23 RX ADMIN — TRIAMCINOLONE ACETONIDE 40 MG: 40 INJECTION, SUSPENSION INTRA-ARTICULAR; INTRAMUSCULAR at 14:45

## 2025-06-23 NOTE — PROGRESS NOTES
Knee Follow up Office Note    Assessment:     1. Primary osteoarthritis of left knee          Plan:  Assessment & Plan  Primary osteoarthritis of left knee  56 y/o male with left knee pain secondary to left knee osteoarthritis.  Discussed treatment options including continued observation, low impact exercises, bracing, anti-inflammatories, physical therapy, cortisone injection, visco injection, versus surgical intervention. Continue activities as tolerated. Discussed considering surgical intervention for sometime in November/December 2025. Left knee cortisone injection performed in the meantime, patient tolerated well.  Ice as needed. Follow up in July/August as scheduled.       Subjective:     Patient ID: Clint Fierro III is a 55 y.o. male.  Chief Complaint:  HPI:  55 y.o. male presents to the office for follow up of left knee pain.  He has known left knee osteoarthritis which he has been treating for conservatively.  He now is experiencing anteromedial and posterolateral left knee pain. His pain worsens with activities. He has tried a medrol dose drea without relief. He has not gotten relief from previous cortisone injection. He is interested in discussing surgical intervention but wants to hold off until the end of his work season in November/December timeframe. He would like to discuss left knee cortisone injection today.    He is also s/p JOHN of right total knee arthroplasty performed on 05/30/2024 and right total knee performed on 02/29/2024, doing well.     Allergy:  Allergies   Allergen Reactions    Chantix [Varenicline] Swelling     In legs     Medications:  all current active meds have been reviewed  Past Medical History:  Past Medical History:   Diagnosis Date    Hearing loss, left     Hyperlipidemia     Osteoarthritis     right knee    Pain in right knee     Pneumonia     Seasonal allergies      Past Surgical History:  Past Surgical History:   Procedure Laterality Date    COLONOSCOPY      FRACTURE  SURGERY      right humerus    KNEE ARTHROSCOPY Bilateral     debridement    [place] ARTHRP KNE CONDYLE&PLATU MEDIAL&LAT COMPARTMENTS Right 2/29/2024    Procedure: ARTHROPLASTY KNEE TOTAL;  Surgeon: Christiano Delgado DO;  Location: [facility];  Service: Orthopedics    MI MANIPULATION KNEE JOINT UNDER GENERAL ANESTHESIA Right 5/30/2024    Procedure: MANIPULATION JOINT KNEE;  Surgeon: Christiano Delgado DO;  Location: [facility];  Service: Orthopedics    ROTATOR CUFF REPAIR Right     labrum, rotator cuff and bicep tendon repairs    SHOULDER SURGERY Right 2014     Family History:  Family History   Problem Relation Name Age of Onset    Alcohol abuse Mother F         denies    Substance Abuse Mother F         denies    Mental illness Mother F         denies    Colon polyps Neg Hx      Colon cancer Neg Hx       Social History:  Social History     Substance and Sexual Activity   Alcohol Use Yes    Alcohol/week: 10.0 standard drinks of alcohol    Types: 10 Cans of beer per week    Comment: few x week     Social History     Substance and Sexual Activity   Drug Use No     Social History     Tobacco Use   Smoking Status Every Day    Current packs/day: 0.00    Types: Cigarettes   Smokeless Tobacco Current    Types: Snuff   Tobacco Comments    Quit smoking 35 yrs ago         ROS:  General: Per HPI  Skin: Negative, except if noted below  HEENT: Negative  Respiratory: Negative  Cardiovascular: Negative  Gastrointestinal: Negative  Urinary: Negative  Vascular: Negative  Musculoskeletal: Positive per HPI   Neurologic: Positive per HPI  Endocrine: Negative    Objective:  BP Readings from Last 1 Encounters:   01/31/25 128/86      Wt Readings from Last 1 Encounters:   06/23/25 127 kg (279 lb)        Respiratory:   non-labored respirations    Lymphatics:  no palpable lymph nodes    Gait:   Antalgic    Neurologic:   Alert and oriented times 3  Patient with normal sensation except as noted below  Deep tendon reflexes 2+ except as noted  in [facility] exam    Bilateral Lower Extremity:  Left Knee:      Inspection:  skin intact    Overall limb alignment varus    Effusion: mild    ROM 3-115 without pain    Extensor Lag: none    Palpation: medial Joint line tenderness to palpation    AP Stability at 90 deg stable    M/L stability in full extension stable    M/L stability in midflexion stable    Motor: 5/5 IP/Q/HS/TA/GS    Pulses: 2+ DP / 2+ PT    SILT DP/SP/S/S/TN    Right knee:     Inspection:  well healed incision    Overall limb alignment neutral    Effusion: none    ROM 3-100 without pain    Extensor Lag: none    Palpation: no Joint line tenderness to palpation    AP Stability at 90 deg stable    M/L stability in full extension stable    M/L stability in midflexion stable    Motor: 5/5 Q/HS/TA/GS/P    Pulses: 2+ DP / 2+ PT    SILT DP/SP/S/S/TN    Large joint arthrocentesis: L knee    Performed by: Marjorie Miles PA-C  Authorized by: Marjorie Miles PA-C    Universal Protocol:  Consent: Verbal consent obtained  Risks and benefits: risks, benefits and alternatives were discussed  Consent given by: patient  Patient understanding: patient states understanding of the procedure being performed  Supporting Documentation  Indications: pain     Is this a Visco injection? NoProcedure Details  Location: knee - L knee  Preparation: Patient was prepped and draped in the usual sterile fashion  Needle size: 22 G  Approach: anterolateral  Medications administered: 40 mg triamcinolone acetonide 40 mg/mL; 4 mL lidocaine 1 %    Patient tolerance: patient tolerated the procedure well with no immediate complications  Dressing:  Sterile dressing applied          BMI:   Estimated body mass index is 37.84 kg/m² as calculated from the following:    Height as of this encounter: 6' (1.829 m).    Weight as of this encounter: 127 kg (279 lb).  BSA:   Estimated body surface area is 2.46 meters squared as calculated from the following:    Height as of this encounter: 6' (1.829 m).     Weight as of this encounter: 127 kg (279 lb).             Marjorie Miles PA-C

## 2025-06-23 NOTE — ASSESSMENT & PLAN NOTE
54 y/o male with left knee pain secondary to left knee osteoarthritis.  Discussed treatment options including continued observation, low impact exercises, bracing, anti-inflammatories, physical therapy, cortisone injection, visco injection, versus surgical intervention. Continue activities as tolerated. Discussed considering surgical intervention for sometime in November/December 2025. Left knee cortisone injection performed in the meantime, patient tolerated well.  Ice as needed. Follow up in July/August as scheduled.

## 2025-07-25 VITALS — HEIGHT: 72 IN | BODY MASS INDEX: 37.79 KG/M2 | WEIGHT: 279 LBS

## 2025-07-25 DIAGNOSIS — M17.12 PRIMARY OSTEOARTHRITIS OF LEFT KNEE: Primary | ICD-10-CM

## 2025-07-25 PROCEDURE — 99214 OFFICE O/P EST MOD 30 MIN: CPT | Performed by: STUDENT IN AN ORGANIZED HEALTH CARE EDUCATION/TRAINING PROGRAM

## 2025-07-25 RX ORDER — FOLIC ACID 1 MG/1
1 TABLET ORAL DAILY
Qty: 30 TABLET | Refills: 1 | Status: SHIPPED | OUTPATIENT
Start: 2025-07-25

## 2025-07-25 RX ORDER — ASCORBIC ACID 500 MG
500 TABLET ORAL 2 TIMES DAILY
Qty: 60 TABLET | Refills: 1 | Status: SHIPPED | OUTPATIENT
Start: 2025-07-25

## 2025-07-25 RX ORDER — SODIUM CHLORIDE, SODIUM LACTATE, POTASSIUM CHLORIDE, CALCIUM CHLORIDE 600; 310; 30; 20 MG/100ML; MG/100ML; MG/100ML; MG/100ML
125 INJECTION, SOLUTION INTRAVENOUS CONTINUOUS
OUTPATIENT
Start: 2025-07-25

## 2025-07-25 RX ORDER — CHLORHEXIDINE GLUCONATE 40 MG/ML
SOLUTION TOPICAL DAILY PRN
OUTPATIENT
Start: 2025-07-25

## 2025-07-25 RX ORDER — VITAMIN B COMPLEX
2000 TABLET ORAL DAILY
Qty: 60 TABLET | Refills: 1 | Status: SHIPPED | OUTPATIENT
Start: 2025-07-25

## 2025-07-25 RX ORDER — MULTIVIT-MIN/IRON FUM/FOLIC AC 7.5 MG-4
1 TABLET ORAL DAILY
Qty: 30 TABLET | Refills: 1 | Status: SHIPPED | OUTPATIENT
Start: 2025-07-25

## 2025-07-25 RX ORDER — MUPIROCIN 2 %
OINTMENT (GRAM) TOPICAL
Qty: 15 G | Refills: 1 | Status: SHIPPED | OUTPATIENT
Start: 2025-07-25

## 2025-07-25 RX ORDER — CHLORHEXIDINE GLUCONATE ORAL RINSE 1.2 MG/ML
15 SOLUTION DENTAL ONCE
OUTPATIENT
Start: 2025-07-25 | End: 2025-07-25

## 2025-07-25 RX ORDER — ACETAMINOPHEN 325 MG/1
975 TABLET ORAL ONCE
OUTPATIENT
Start: 2025-07-25 | End: 2025-07-25

## 2025-07-25 RX ORDER — SODIUM CHLORIDE, SODIUM LACTATE, POTASSIUM CHLORIDE, CALCIUM CHLORIDE 600; 310; 30; 20 MG/100ML; MG/100ML; MG/100ML; MG/100ML
20 INJECTION, SOLUTION INTRAVENOUS CONTINUOUS
OUTPATIENT
Start: 2025-07-25

## 2025-07-25 NOTE — PROGRESS NOTES
Knee Follow up Office Note    Assessment:     1. Primary osteoarthritis of left knee            Plan:  Assessment & Plan  Primary osteoarthritis of left knee  56 y/o male with left knee pain secondary to left knee osteoarthritis.  He has been treating with intermittent cortisone injections with minimal benefit. Discussed treatment options including continued observation, low impact exercises, bracing, anti-inflammatories, physical therapy, cortisone injection, visco injection, versus surgical intervention. Surgical treatment option of Left TKA reviewed today with the patient including surgical procedure and recovery time after.   The patient has elected to proceed with Left TKA. Risks and benefits of surgery to include but not limited to bleeding, infection, damage to surrounding structures, hardware failure, instability, fracture, dislocation, need for further surgery, continued pain, stiffness, blood clots, stroke, and heart attack was discussed with the patient. Informed consent was signed today in the office. The patient has met with our surgical schedulers and our preoperative joint replacement pathway has been initiated. All questions were answered. Patient will follow-up 2 weeks post operatively. BMI is acceptable at 37.84 and is a nonsmoker.        Orders:  •  Case request operating room: ARTHROPLASTY KNEE TOTAL- possible same day; Standing  •  ascorbic acid (VITAMIN C) 500 MG tablet; Take 1 tablet (500 mg total) by mouth 2 (two) times a day  •  folic acid (FOLVITE) 1 mg tablet; Take 1 tablet (1 mg total) by mouth daily  •  Multiple Vitamins-Minerals (multivitamin with minerals) tablet; Take 1 tablet by mouth daily  •  cholecalciferol (VITAMIN D3) 25 mcg (1,000 units) tablet; Take 2 tablets (2,000 Units total) by mouth daily  •  mupirocin (BACTROBAN) 2 % ointment; Apply topically to the inside of the left and right nostrils twice daily for 5 days before surgery, including the morning of surgery.  •   Comprehensive metabolic panel  •  Hemoglobin A1C W/EAG Estimation  •  CBC and differential  •  MRSA culture  •  Anemia Panel w/Reflex  •  Protime-INR  •  APTT  •  Ambulatory Referral to [facility]; Future  •  Ambulatory referral to Physical Therapy; Future  •  EKG 12 lead; Future    Subjective:     Patient ID: Clint Fierro III is a 55 y.o. male.  Chief Complaint:  HPI:  55 y.o. male presents to the office for follow up of left knee pain.  He has known left knee osteoarthritis which he has been treating for conservatively.  He now is experiencing anteromedial and posterolateral left knee pain. His pain worsens with activities. He has tried a medrol dose drea without relief. He has not gotten relief from previous cortisone injection, though one was repeated at last visit in June to get him through the summer before he is thinking of surgery.  He is interested in discussing surgical intervention but in November/December timeframe.  He is here today to schedule surgery.    He is also s/p JOHN of right total knee arthroplasty performed on 05/30/2024 and right total knee performed on 02/29/2024, doing well.     Allergy:  Allergies   Allergen Reactions   • Chantix [Varenicline] Swelling     In legs     Medications:  all current active meds have been reviewed  Past Medical History:  Past Medical History:   Diagnosis Date   • Hearing loss, left    • Hyperlipidemia    • Osteoarthritis     right knee   • Pain in right knee    • Pneumonia    • Seasonal allergies      Past Surgical History:  Past Surgical History:   Procedure Laterality Date   • COLONOSCOPY     • FRACTURE SURGERY      right humerus   • KNEE ARTHROSCOPY Bilateral     debridement   • UT ARTHRP KNE CONDYLE&PLATU MEDIAL&LAT COMPARTMENTS Right 2/29/2024    Procedure: ARTHROPLASTY KNEE TOTAL;  Surgeon: Christiano Delgado DO;  Location: [facility];  Service: Orthopedics   • UT MANIPULATION KNEE JOINT UNDER GENERAL ANESTHESIA Right 5/30/2024     Procedure: MANIPULATION JOINT KNEE;  Surgeon: Christiano Delgado DO;  Location: [facility];  Service: Orthopedics   • ROTATOR CUFF REPAIR Right     labrum, rotator cuff and bicep tendon repairs   • SHOULDER SURGERY Right 2014     Family History:  Family History   Problem Relation Name Age of Onset   • Alcohol abuse Mother F         denies   • Substance Abuse Mother F         denies   • Mental illness Mother F         denies   • Colon polyps Neg Hx     • Colon cancer Neg Hx       Social History:  Social History     Substance and Sexual Activity   Alcohol Use Yes   • Alcohol/week: 10.0 standard drinks of alcohol   • Types: 10 Cans of beer per week    Comment: few x week     Social History     Substance and Sexual Activity   Drug Use No     Social History     Tobacco Use   Smoking Status Every Day   • Current packs/day: 0.00   • Types: Cigarettes   Smokeless Tobacco Current   • Types: Snuff   Tobacco Comments    Quit smoking 35 yrs ago         ROS:  General: Per HPI  Skin: Negative, except if noted below  HEENT: Negative  Respiratory: Negative  Cardiovascular: Negative  Gastrointestinal: Negative  Urinary: Negative  Vascular: Negative  Musculoskeletal: Positive per HPI   Neurologic: Positive per HPI  Endocrine: Negative    Objective:  BP Readings from Last 1 Encounters:   01/31/25 128/86      Wt Readings from Last 1 Encounters:   07/25/25 127 kg (279 lb)        Respiratory:   non-labored respirations    Lymphatics:  no palpable lymph nodes    Gait:   Antalgic    Neurologic:   Alert and oriented times 3  Patient with normal sensation except as noted below  Deep tendon reflexes 2+ except as noted in [facility] exam    Bilateral Lower Extremity:  Left Knee:      Inspection:  skin intact    Overall limb alignment varus    Effusion: mild    ROM 3-115 without pain    Extensor Lag: none    Palpation: medial Joint line tenderness to palpation    AP Stability at 90 deg stable    M/L stability in full extension stable    M/L  stability in midflexion stable    Motor: 5/5 IP/Q/HS/TA/GS    Pulses: 2+ DP / 2+ PT    SILT DP/SP/S/S/TN    Right knee:     Inspection:  well healed incision    Overall limb alignment neutral    Effusion: none    ROM 3-95 without pain    Extensor Lag: none    Palpation: no Joint line tenderness to palpation    AP Stability at 90 deg stable    M/L stability in full extension stable    M/L stability in midflexion stable    Motor: 5/5 Q/HS/TA/GS/P    Pulses: 2+ DP / 2+ PT    SILT DP/SP/S/S/TN        BMI:   Estimated body mass index is 37.84 kg/m² as calculated from the following:    Height as of this encounter: 6' (1.829 m).    Weight as of this encounter: 127 kg (279 lb).  BSA:   Estimated body surface area is 2.46 meters squared as calculated from the following:    Height as of this encounter: 6' (1.829 m).    Weight as of this encounter: 127 kg (279 lb).             Scribe Attestation    I,:  Marjorie Miles PA-C am acting as a scribe while in the presence of the attending physician.:       I,:  Christiano Delgado, DO personally performed the services described in this documentation    as scribed in my presence.:

## 2025-07-25 NOTE — ASSESSMENT & PLAN NOTE
56 y/o male with left knee pain secondary to left knee osteoarthritis.  He has been treating with intermittent cortisone injections with minimal benefit. Discussed treatment options including continued observation, low impact exercises, bracing, anti-inflammatories, physical therapy, cortisone injection, visco injection, versus surgical intervention. Surgical treatment option of Left TKA reviewed today with the patient including surgical procedure and recovery time after.   The patient has elected to proceed with Left TKA. Risks and benefits of surgery to include but not limited to bleeding, infection, damage to surrounding structures, hardware failure, instability, fracture, dislocation, need for further surgery, continued pain, stiffness, blood clots, stroke, and heart attack was discussed with the patient. Informed consent was signed today in the office. The patient has met with our surgical schedulers and our preoperative joint replacement pathway has been initiated. All questions were answered. Patient will follow-up 2 weeks post operatively. BMI is acceptable at 37.84 and is a nonsmoker.        Orders:  •  Case request operating room: ARTHROPLASTY KNEE TOTAL- possible same day; Standing  •  ascorbic acid (VITAMIN C) 500 MG tablet; Take 1 tablet (500 mg total) by mouth 2 (two) times a day  •  folic acid (FOLVITE) 1 mg tablet; Take 1 tablet (1 mg total) by mouth daily  •  Multiple Vitamins-Minerals (multivitamin with minerals) tablet; Take 1 tablet by mouth daily  •  cholecalciferol (VITAMIN D3) 25 mcg (1,000 units) tablet; Take 2 tablets (2,000 Units total) by mouth daily  •  mupirocin (BACTROBAN) 2 % ointment; Apply topically to the inside of the left and right nostrils twice daily for 5 days before surgery, including the morning of surgery.  •  Comprehensive metabolic panel  •  Hemoglobin A1C W/EAG Estimation  •  CBC and differential  •  MRSA culture  •  Anemia Panel w/Reflex  •  Protime-INR  •  APTT  •   Ambulatory Referral to [facility]; Future  •  Ambulatory referral to Physical Therapy; Future  •  EKG 12 lead; Future

## 2025-07-31 ENCOUNTER — APPOINTMENT (OUTPATIENT)
Dept: LAB | Facility: HOSPITAL | Age: 55
End: 2025-07-31

## 2025-07-31 DIAGNOSIS — Z00.8 HEALTH EXAMINATION IN POPULATION SURVEY: Primary | ICD-10-CM

## 2025-07-31 LAB
CHOLEST SERPL-MCNC: 229 MG/DL (ref ?–200)
EST. AVERAGE GLUCOSE BLD GHB EST-MCNC: 114 MG/DL
HBA1C MFR BLD: 5.6 %
HDLC SERPL-MCNC: 45 MG/DL
LDLC SERPL CALC-MCNC: 157 MG/DL (ref 0–100)
NONHDLC SERPL-MCNC: 184 MG/DL
TRIGL SERPL-MCNC: 134 MG/DL (ref ?–150)

## 2025-07-31 PROCEDURE — 80061 LIPID PANEL: CPT

## 2025-07-31 PROCEDURE — 36415 COLL VENOUS BLD VENIPUNCTURE: CPT

## 2025-07-31 PROCEDURE — 83036 HEMOGLOBIN GLYCOSYLATED A1C: CPT

## (undated) DEVICE — GLOVE INDICATOR PI UNDERGLOVE SZ 6.5 BLUE

## (undated) DEVICE — GLOVE SRG BIOGEL ORTHOPEDIC 8.5

## (undated) DEVICE — PLUMEPEN PRO 10FT

## (undated) DEVICE — ACE WRAP 6 IN UNSTERILE

## (undated) DEVICE — SPECIMEN CONTAINER STERILE PEEL PACK

## (undated) DEVICE — BETHLEHEM UNIV TOTAL KNEE, KIT: Brand: CARDINAL HEALTH

## (undated) DEVICE — IMPERVIOUS STOCKINETTE: Brand: DEROYAL

## (undated) DEVICE — ADHESIVE SKIN HIGH VISCOSITY EXOFIN 1ML

## (undated) DEVICE — HANDPIECE SET WITH RETRACTABLE COAXIAL FAN SPRAY TIP AND SUCTION TUBE: Brand: INTERPULSE

## (undated) DEVICE — DRESSING MEPILEX AG BORDER POST-OP 4 X 12 IN

## (undated) DEVICE — 450 ML BOTTLE OF 0.05% CHLORHEXIDINE GLUCONATE IN 99.95% STERILE WATER FOR IRRIGATION, USP AND APPLICATOR.: Brand: IRRISEPT ANTIMICROBIAL WOUND LAVAGE

## (undated) DEVICE — CAPIT KNEE ATTUNE FB MEDIAL STAB AOX POR

## (undated) DEVICE — WEBRIL 6 IN UNSTERILE

## (undated) DEVICE — GLOVE SRG BIOGEL 6.5

## (undated) DEVICE — HOOD: Brand: T7PLUS

## (undated) DEVICE — NEEDLE 18 G X 1 1/2

## (undated) DEVICE — SPONGE SCRUB 4 PCT CHLORHEXIDINE

## (undated) DEVICE — SUT STRATAFIX SPIRAL PDS PLUS 1 CTX 18 IN SXPP1A400

## (undated) DEVICE — CEMENT MIXING SYSTEM WITH FEMORAL BREAKWAY NOZZLE: Brand: REVOLUTION

## (undated) DEVICE — 3M™ IOBAN™ 2 ANTIMICROBIAL INCISE DRAPE 6650EZ: Brand: IOBAN™ 2

## (undated) DEVICE — SURGICAL GOWN, XL SMARTSLEEVE: Brand: CONVERTORS

## (undated) DEVICE — GLOVE INDICATOR PI UNDERGLOVE SZ 8.5 BLUE

## (undated) DEVICE — SUT VICRYL 2-0 CT-1 36 IN J945H

## (undated) DEVICE — GLOVE SRG BIOGEL 8.5

## (undated) DEVICE — SYRINGE 50ML LL

## (undated) DEVICE — SUT VICRYL 1 CT-1 27 IN J261H

## (undated) DEVICE — 3M™ STERI-DRAPE™ U-DRAPE 1015: Brand: STERI-DRAPE™

## (undated) DEVICE — SAW BLADE OSCILLATING BRAZOL 167

## (undated) DEVICE — SUT STRATAFIX SPIRAL PLUS 3-0 PS-2 30 X 30 CM SXMP2B408